# Patient Record
Sex: MALE | Race: WHITE | NOT HISPANIC OR LATINO | Employment: OTHER | URBAN - METROPOLITAN AREA
[De-identification: names, ages, dates, MRNs, and addresses within clinical notes are randomized per-mention and may not be internally consistent; named-entity substitution may affect disease eponyms.]

---

## 2017-01-16 ENCOUNTER — COMMUNICATION - HEALTHEAST (OUTPATIENT)
Dept: INTERNAL MEDICINE | Facility: CLINIC | Age: 62
End: 2017-01-16

## 2017-01-16 DIAGNOSIS — F90.9 ADHD (ATTENTION DEFICIT HYPERACTIVITY DISORDER): ICD-10-CM

## 2017-02-14 ENCOUNTER — COMMUNICATION - HEALTHEAST (OUTPATIENT)
Dept: SCHEDULING | Facility: CLINIC | Age: 62
End: 2017-02-14

## 2017-02-14 DIAGNOSIS — F90.9 ADHD (ATTENTION DEFICIT HYPERACTIVITY DISORDER): ICD-10-CM

## 2017-02-17 ENCOUNTER — COMMUNICATION - HEALTHEAST (OUTPATIENT)
Dept: INTERNAL MEDICINE | Facility: CLINIC | Age: 62
End: 2017-02-17

## 2017-02-20 ENCOUNTER — RECORDS - HEALTHEAST (OUTPATIENT)
Dept: ADMINISTRATIVE | Facility: OTHER | Age: 62
End: 2017-02-20

## 2017-03-14 ENCOUNTER — COMMUNICATION - HEALTHEAST (OUTPATIENT)
Dept: INTERNAL MEDICINE | Facility: CLINIC | Age: 62
End: 2017-03-14

## 2017-03-20 ENCOUNTER — COMMUNICATION - HEALTHEAST (OUTPATIENT)
Dept: INTERNAL MEDICINE | Facility: CLINIC | Age: 62
End: 2017-03-20

## 2017-03-20 DIAGNOSIS — F90.9 ADHD (ATTENTION DEFICIT HYPERACTIVITY DISORDER): ICD-10-CM

## 2017-04-18 ENCOUNTER — COMMUNICATION - HEALTHEAST (OUTPATIENT)
Dept: INTERNAL MEDICINE | Facility: CLINIC | Age: 62
End: 2017-04-18

## 2017-04-18 DIAGNOSIS — F90.9 ADHD (ATTENTION DEFICIT HYPERACTIVITY DISORDER): ICD-10-CM

## 2017-05-15 ENCOUNTER — COMMUNICATION - HEALTHEAST (OUTPATIENT)
Dept: INTERNAL MEDICINE | Facility: CLINIC | Age: 62
End: 2017-05-15

## 2017-05-15 DIAGNOSIS — F31.9 BIPOLAR DISORDER, UNSPECIFIED (H): ICD-10-CM

## 2017-05-15 DIAGNOSIS — F90.9 ADHD (ATTENTION DEFICIT HYPERACTIVITY DISORDER): ICD-10-CM

## 2017-06-14 ENCOUNTER — COMMUNICATION - HEALTHEAST (OUTPATIENT)
Dept: INTERNAL MEDICINE | Facility: CLINIC | Age: 62
End: 2017-06-14

## 2017-06-14 DIAGNOSIS — F90.9 ADHD (ATTENTION DEFICIT HYPERACTIVITY DISORDER): ICD-10-CM

## 2017-07-03 ENCOUNTER — COMMUNICATION - HEALTHEAST (OUTPATIENT)
Dept: INTERNAL MEDICINE | Facility: CLINIC | Age: 62
End: 2017-07-03

## 2017-07-03 DIAGNOSIS — S62.609A FINGER FRACTURE, LEFT: ICD-10-CM

## 2017-07-10 ENCOUNTER — RECORDS - HEALTHEAST (OUTPATIENT)
Dept: ADMINISTRATIVE | Facility: OTHER | Age: 62
End: 2017-07-10

## 2017-07-11 ENCOUNTER — COMMUNICATION - HEALTHEAST (OUTPATIENT)
Dept: INTERNAL MEDICINE | Facility: CLINIC | Age: 62
End: 2017-07-11

## 2017-07-11 DIAGNOSIS — F90.9 ADHD (ATTENTION DEFICIT HYPERACTIVITY DISORDER): ICD-10-CM

## 2017-08-14 ENCOUNTER — COMMUNICATION - HEALTHEAST (OUTPATIENT)
Dept: INTERNAL MEDICINE | Facility: CLINIC | Age: 62
End: 2017-08-14

## 2017-08-14 DIAGNOSIS — F90.9 ADHD (ATTENTION DEFICIT HYPERACTIVITY DISORDER): ICD-10-CM

## 2017-09-04 ENCOUNTER — COMMUNICATION - HEALTHEAST (OUTPATIENT)
Dept: INTERNAL MEDICINE | Facility: CLINIC | Age: 62
End: 2017-09-04

## 2017-09-04 DIAGNOSIS — F31.9 BIPOLAR DISORDER, UNSPECIFIED (H): ICD-10-CM

## 2017-09-12 ENCOUNTER — COMMUNICATION - HEALTHEAST (OUTPATIENT)
Dept: INTERNAL MEDICINE | Facility: CLINIC | Age: 62
End: 2017-09-12

## 2017-09-12 DIAGNOSIS — F90.9 ADHD (ATTENTION DEFICIT HYPERACTIVITY DISORDER): ICD-10-CM

## 2017-09-12 DIAGNOSIS — I10 HYPERTENSION: ICD-10-CM

## 2017-09-28 ENCOUNTER — OFFICE VISIT - HEALTHEAST (OUTPATIENT)
Dept: INTERNAL MEDICINE | Facility: CLINIC | Age: 62
End: 2017-09-28

## 2017-09-28 DIAGNOSIS — Z51.81 MEDICATION MONITORING ENCOUNTER: ICD-10-CM

## 2017-09-28 DIAGNOSIS — Z12.5 PROSTATE CANCER SCREENING: ICD-10-CM

## 2017-09-28 DIAGNOSIS — I10 ESSENTIAL HYPERTENSION: ICD-10-CM

## 2017-09-28 DIAGNOSIS — Z12.11 COLON CANCER SCREENING: ICD-10-CM

## 2017-09-28 LAB
CHOLEST SERPL-MCNC: 227 MG/DL
FASTING STATUS PATIENT QL REPORTED: YES
HDLC SERPL-MCNC: 58 MG/DL
LDLC SERPL CALC-MCNC: 146 MG/DL
PSA SERPL-MCNC: 0.4 NG/ML (ref 0–4.5)
TRIGL SERPL-MCNC: 116 MG/DL

## 2017-09-28 ASSESSMENT — MIFFLIN-ST. JEOR: SCORE: 1549.66

## 2017-09-29 ENCOUNTER — COMMUNICATION - HEALTHEAST (OUTPATIENT)
Dept: INTERNAL MEDICINE | Facility: CLINIC | Age: 62
End: 2017-09-29

## 2017-10-10 ENCOUNTER — COMMUNICATION - HEALTHEAST (OUTPATIENT)
Dept: INTERNAL MEDICINE | Facility: CLINIC | Age: 62
End: 2017-10-10

## 2017-10-10 DIAGNOSIS — F90.9 ADHD (ATTENTION DEFICIT HYPERACTIVITY DISORDER): ICD-10-CM

## 2017-10-12 ENCOUNTER — COMMUNICATION - HEALTHEAST (OUTPATIENT)
Dept: INTERNAL MEDICINE | Facility: CLINIC | Age: 62
End: 2017-10-12

## 2017-10-12 DIAGNOSIS — I10 HYPERTENSION: ICD-10-CM

## 2017-11-13 ENCOUNTER — COMMUNICATION - HEALTHEAST (OUTPATIENT)
Dept: INTERNAL MEDICINE | Facility: CLINIC | Age: 62
End: 2017-11-13

## 2017-11-13 DIAGNOSIS — F90.9 ADHD (ATTENTION DEFICIT HYPERACTIVITY DISORDER): ICD-10-CM

## 2017-11-15 ENCOUNTER — COMMUNICATION - HEALTHEAST (OUTPATIENT)
Dept: INTERNAL MEDICINE | Facility: CLINIC | Age: 62
End: 2017-11-15

## 2017-11-15 DIAGNOSIS — M25.50 JOINT PAIN: ICD-10-CM

## 2017-12-11 ENCOUNTER — COMMUNICATION - HEALTHEAST (OUTPATIENT)
Dept: INTERNAL MEDICINE | Facility: CLINIC | Age: 62
End: 2017-12-11

## 2017-12-11 DIAGNOSIS — F90.9 ADHD (ATTENTION DEFICIT HYPERACTIVITY DISORDER): ICD-10-CM

## 2018-01-10 ENCOUNTER — COMMUNICATION - HEALTHEAST (OUTPATIENT)
Dept: INTERNAL MEDICINE | Facility: CLINIC | Age: 63
End: 2018-01-10

## 2018-01-10 DIAGNOSIS — F90.9 ADHD (ATTENTION DEFICIT HYPERACTIVITY DISORDER): ICD-10-CM

## 2018-02-07 ENCOUNTER — COMMUNICATION - HEALTHEAST (OUTPATIENT)
Dept: INTERNAL MEDICINE | Facility: CLINIC | Age: 63
End: 2018-02-07

## 2018-02-07 DIAGNOSIS — F90.9 ADHD (ATTENTION DEFICIT HYPERACTIVITY DISORDER): ICD-10-CM

## 2018-03-07 ENCOUNTER — COMMUNICATION - HEALTHEAST (OUTPATIENT)
Dept: INTERNAL MEDICINE | Facility: CLINIC | Age: 63
End: 2018-03-07

## 2018-03-07 DIAGNOSIS — F90.9 ADHD (ATTENTION DEFICIT HYPERACTIVITY DISORDER): ICD-10-CM

## 2018-03-09 ENCOUNTER — COMMUNICATION - HEALTHEAST (OUTPATIENT)
Dept: INTERNAL MEDICINE | Facility: CLINIC | Age: 63
End: 2018-03-09

## 2018-03-09 DIAGNOSIS — F90.9 ATTENTION DEFICIT HYPERACTIVITY DISORDER (ADHD), UNSPECIFIED ADHD TYPE: ICD-10-CM

## 2018-03-12 ENCOUNTER — COMMUNICATION - HEALTHEAST (OUTPATIENT)
Dept: INTERNAL MEDICINE | Facility: CLINIC | Age: 63
End: 2018-03-12

## 2018-03-12 ENCOUNTER — RECORDS - HEALTHEAST (OUTPATIENT)
Dept: ADMINISTRATIVE | Facility: OTHER | Age: 63
End: 2018-03-12

## 2018-03-29 ENCOUNTER — COMMUNICATION - HEALTHEAST (OUTPATIENT)
Dept: SCHEDULING | Facility: CLINIC | Age: 63
End: 2018-03-29

## 2018-04-03 ENCOUNTER — COMMUNICATION - HEALTHEAST (OUTPATIENT)
Dept: INTERNAL MEDICINE | Facility: CLINIC | Age: 63
End: 2018-04-03

## 2018-04-03 DIAGNOSIS — F31.9 BIPOLAR DISORDER (H): ICD-10-CM

## 2018-04-03 DIAGNOSIS — I10 HYPERTENSION: ICD-10-CM

## 2018-04-05 ENCOUNTER — COMMUNICATION - HEALTHEAST (OUTPATIENT)
Dept: INTERNAL MEDICINE | Facility: CLINIC | Age: 63
End: 2018-04-05

## 2018-04-05 DIAGNOSIS — I10 HYPERTENSION: ICD-10-CM

## 2018-04-05 DIAGNOSIS — F31.9 BIPOLAR DISORDER (H): ICD-10-CM

## 2018-04-10 ENCOUNTER — OFFICE VISIT - HEALTHEAST (OUTPATIENT)
Dept: INTERNAL MEDICINE | Facility: CLINIC | Age: 63
End: 2018-04-10

## 2018-04-10 DIAGNOSIS — F90.9 ADHD (ATTENTION DEFICIT HYPERACTIVITY DISORDER): ICD-10-CM

## 2018-04-10 DIAGNOSIS — F31.9 BIPOLAR DISORDER (H): ICD-10-CM

## 2018-05-09 ENCOUNTER — COMMUNICATION - HEALTHEAST (OUTPATIENT)
Dept: INTERNAL MEDICINE | Facility: CLINIC | Age: 63
End: 2018-05-09

## 2018-05-09 DIAGNOSIS — F90.9 ADHD (ATTENTION DEFICIT HYPERACTIVITY DISORDER): ICD-10-CM

## 2018-05-10 ENCOUNTER — RECORDS - HEALTHEAST (OUTPATIENT)
Dept: ADMINISTRATIVE | Facility: OTHER | Age: 63
End: 2018-05-10

## 2018-06-05 ENCOUNTER — COMMUNICATION - HEALTHEAST (OUTPATIENT)
Dept: INTERNAL MEDICINE | Facility: CLINIC | Age: 63
End: 2018-06-05

## 2018-06-05 DIAGNOSIS — F90.9 ADHD (ATTENTION DEFICIT HYPERACTIVITY DISORDER): ICD-10-CM

## 2018-07-03 ENCOUNTER — COMMUNICATION - HEALTHEAST (OUTPATIENT)
Dept: INTERNAL MEDICINE | Facility: CLINIC | Age: 63
End: 2018-07-03

## 2018-07-03 DIAGNOSIS — F90.9 ADHD (ATTENTION DEFICIT HYPERACTIVITY DISORDER): ICD-10-CM

## 2018-07-04 ENCOUNTER — COMMUNICATION - HEALTHEAST (OUTPATIENT)
Dept: INTERNAL MEDICINE | Facility: CLINIC | Age: 63
End: 2018-07-04

## 2018-07-04 DIAGNOSIS — I10 HYPERTENSION: ICD-10-CM

## 2018-08-02 ENCOUNTER — COMMUNICATION - HEALTHEAST (OUTPATIENT)
Dept: INTERNAL MEDICINE | Facility: CLINIC | Age: 63
End: 2018-08-02

## 2018-08-02 DIAGNOSIS — F90.9 ADHD (ATTENTION DEFICIT HYPERACTIVITY DISORDER): ICD-10-CM

## 2018-08-29 ENCOUNTER — COMMUNICATION - HEALTHEAST (OUTPATIENT)
Dept: INTERNAL MEDICINE | Facility: CLINIC | Age: 63
End: 2018-08-29

## 2018-08-29 DIAGNOSIS — I10 HYPERTENSION: ICD-10-CM

## 2018-08-30 ENCOUNTER — COMMUNICATION - HEALTHEAST (OUTPATIENT)
Dept: INTERNAL MEDICINE | Facility: CLINIC | Age: 63
End: 2018-08-30

## 2018-08-30 DIAGNOSIS — F90.9 ADHD (ATTENTION DEFICIT HYPERACTIVITY DISORDER): ICD-10-CM

## 2018-09-27 ENCOUNTER — COMMUNICATION - HEALTHEAST (OUTPATIENT)
Dept: INTERNAL MEDICINE | Facility: CLINIC | Age: 63
End: 2018-09-27

## 2018-09-27 DIAGNOSIS — F31.9 BIPOLAR DISORDER (H): ICD-10-CM

## 2018-09-27 DIAGNOSIS — M25.50 JOINT PAIN: ICD-10-CM

## 2018-09-27 DIAGNOSIS — F90.9 ADHD (ATTENTION DEFICIT HYPERACTIVITY DISORDER): ICD-10-CM

## 2018-09-28 ENCOUNTER — COMMUNICATION - HEALTHEAST (OUTPATIENT)
Dept: INTERNAL MEDICINE | Facility: CLINIC | Age: 63
End: 2018-09-28

## 2018-09-28 DIAGNOSIS — I10 HYPERTENSION: ICD-10-CM

## 2018-10-11 ENCOUNTER — OFFICE VISIT - HEALTHEAST (OUTPATIENT)
Dept: INTERNAL MEDICINE | Facility: CLINIC | Age: 63
End: 2018-10-11

## 2018-10-11 DIAGNOSIS — Z00.00 HEALTH CARE MAINTENANCE: ICD-10-CM

## 2018-10-11 DIAGNOSIS — E78.00 HYPERCHOLESTEROLEMIA: ICD-10-CM

## 2018-10-11 DIAGNOSIS — Z12.5 SCREENING FOR PROSTATE CANCER: ICD-10-CM

## 2018-10-11 DIAGNOSIS — I10 BENIGN ESSENTIAL HYPERTENSION: ICD-10-CM

## 2018-10-11 DIAGNOSIS — Z71.6 ENCOUNTER FOR TOBACCO USE CESSATION COUNSELING: ICD-10-CM

## 2018-10-11 DIAGNOSIS — Z12.11 COLON CANCER SCREENING: ICD-10-CM

## 2018-10-11 DIAGNOSIS — Z51.81 MEDICATION MONITORING ENCOUNTER: ICD-10-CM

## 2018-10-11 DIAGNOSIS — F31.75 BIPOLAR DISORDER, IN PARTIAL REMISSION, MOST RECENT EPISODE DEPRESSED (H): ICD-10-CM

## 2018-10-11 LAB
ALBUMIN SERPL-MCNC: 4.1 G/DL (ref 3.5–5)
ALBUMIN UR-MCNC: NEGATIVE MG/DL
ALP SERPL-CCNC: 87 U/L (ref 45–120)
ALT SERPL W P-5'-P-CCNC: 17 U/L (ref 0–45)
AMPHETAMINES UR QL SCN: ABNORMAL
ANION GAP SERPL CALCULATED.3IONS-SCNC: 13 MMOL/L (ref 5–18)
APPEARANCE UR: CLEAR
AST SERPL W P-5'-P-CCNC: 23 U/L (ref 0–40)
BARBITURATES UR QL: ABNORMAL
BENZODIAZ UR QL: ABNORMAL
BILIRUB SERPL-MCNC: 0.8 MG/DL (ref 0–1)
BILIRUB UR QL STRIP: NEGATIVE
BUN SERPL-MCNC: 24 MG/DL (ref 8–22)
CALCIUM SERPL-MCNC: 10 MG/DL (ref 8.5–10.5)
CANNABINOIDS UR QL SCN: ABNORMAL
CHLORIDE BLD-SCNC: 100 MMOL/L (ref 98–107)
CHOLEST SERPL-MCNC: 215 MG/DL
CO2 SERPL-SCNC: 26 MMOL/L (ref 22–31)
COCAINE UR QL: ABNORMAL
COLOR UR AUTO: YELLOW
CREAT SERPL-MCNC: 1.2 MG/DL (ref 0.7–1.3)
CREAT UR-MCNC: 188.9 MG/DL
ERYTHROCYTE [DISTWIDTH] IN BLOOD BY AUTOMATED COUNT: 11.4 % (ref 11–14.5)
FASTING STATUS PATIENT QL REPORTED: ABNORMAL
GFR SERPL CREATININE-BSD FRML MDRD: >60 ML/MIN/1.73M2
GLUCOSE BLD-MCNC: 109 MG/DL (ref 70–125)
GLUCOSE UR STRIP-MCNC: NEGATIVE MG/DL
HCT VFR BLD AUTO: 43.9 % (ref 40–54)
HDLC SERPL-MCNC: 50 MG/DL
HGB BLD-MCNC: 15.1 G/DL (ref 14–18)
HGB UR QL STRIP: NEGATIVE
KETONES UR STRIP-MCNC: NEGATIVE MG/DL
LDLC SERPL CALC-MCNC: 153 MG/DL
LEUKOCYTE ESTERASE UR QL STRIP: NEGATIVE
MCH RBC QN AUTO: 31.3 PG (ref 27–34)
MCHC RBC AUTO-ENTMCNC: 34.4 G/DL (ref 32–36)
MCV RBC AUTO: 91 FL (ref 80–100)
METHADONE UR QL SCN: ABNORMAL
NITRATE UR QL: NEGATIVE
OPIATES UR QL SCN: ABNORMAL
OXYCODONE UR QL: ABNORMAL
PCP UR QL SCN: ABNORMAL
PH UR STRIP: 5.5 [PH] (ref 5–8)
PLATELET # BLD AUTO: 198 THOU/UL (ref 140–440)
PMV BLD AUTO: 7.2 FL (ref 7–10)
POTASSIUM BLD-SCNC: 3.6 MMOL/L (ref 3.5–5)
PROT SERPL-MCNC: 6.9 G/DL (ref 6–8)
PSA SERPL-MCNC: 0.4 NG/ML (ref 0–4.5)
RBC # BLD AUTO: 4.81 MILL/UL (ref 4.4–6.2)
SODIUM SERPL-SCNC: 139 MMOL/L (ref 136–145)
SP GR UR STRIP: 1.02 (ref 1–1.03)
TRIGL SERPL-MCNC: 58 MG/DL
UROBILINOGEN UR STRIP-ACNC: NORMAL
WBC: 5.7 THOU/UL (ref 4–11)

## 2018-10-11 ASSESSMENT — MIFFLIN-ST. JEOR: SCORE: 1505.66

## 2018-10-15 ENCOUNTER — COMMUNICATION - HEALTHEAST (OUTPATIENT)
Dept: INTERNAL MEDICINE | Facility: CLINIC | Age: 63
End: 2018-10-15

## 2018-10-25 ENCOUNTER — COMMUNICATION - HEALTHEAST (OUTPATIENT)
Dept: INTERNAL MEDICINE | Facility: CLINIC | Age: 63
End: 2018-10-25

## 2018-10-25 DIAGNOSIS — F90.9 ADHD (ATTENTION DEFICIT HYPERACTIVITY DISORDER): ICD-10-CM

## 2018-11-21 ENCOUNTER — COMMUNICATION - HEALTHEAST (OUTPATIENT)
Dept: INTERNAL MEDICINE | Facility: CLINIC | Age: 63
End: 2018-11-21

## 2018-11-21 DIAGNOSIS — F90.9 ADHD (ATTENTION DEFICIT HYPERACTIVITY DISORDER): ICD-10-CM

## 2018-12-20 ENCOUNTER — COMMUNICATION - HEALTHEAST (OUTPATIENT)
Dept: INTERNAL MEDICINE | Facility: CLINIC | Age: 63
End: 2018-12-20

## 2018-12-20 DIAGNOSIS — F90.9 ADHD (ATTENTION DEFICIT HYPERACTIVITY DISORDER): ICD-10-CM

## 2019-01-17 ENCOUNTER — COMMUNICATION - HEALTHEAST (OUTPATIENT)
Dept: INTERNAL MEDICINE | Facility: CLINIC | Age: 64
End: 2019-01-17

## 2019-01-17 DIAGNOSIS — F90.9 ADHD (ATTENTION DEFICIT HYPERACTIVITY DISORDER): ICD-10-CM

## 2019-02-01 ENCOUNTER — COMMUNICATION - HEALTHEAST (OUTPATIENT)
Dept: INTERNAL MEDICINE | Facility: CLINIC | Age: 64
End: 2019-02-01

## 2019-02-01 DIAGNOSIS — I10 HYPERTENSION: ICD-10-CM

## 2019-02-15 ENCOUNTER — COMMUNICATION - HEALTHEAST (OUTPATIENT)
Dept: INTERNAL MEDICINE | Facility: CLINIC | Age: 64
End: 2019-02-15

## 2019-02-15 DIAGNOSIS — F90.9 ADHD (ATTENTION DEFICIT HYPERACTIVITY DISORDER): ICD-10-CM

## 2019-02-25 ENCOUNTER — COMMUNICATION - HEALTHEAST (OUTPATIENT)
Dept: INTERNAL MEDICINE | Facility: CLINIC | Age: 64
End: 2019-02-25

## 2019-02-25 DIAGNOSIS — F31.75 BIPOLAR DISORDER, IN PARTIAL REMISSION, MOST RECENT EPISODE DEPRESSED (H): ICD-10-CM

## 2019-03-14 ENCOUNTER — COMMUNICATION - HEALTHEAST (OUTPATIENT)
Dept: INTERNAL MEDICINE | Facility: CLINIC | Age: 64
End: 2019-03-14

## 2019-03-14 DIAGNOSIS — F90.9 ADHD (ATTENTION DEFICIT HYPERACTIVITY DISORDER): ICD-10-CM

## 2019-03-28 ENCOUNTER — COMMUNICATION - HEALTHEAST (OUTPATIENT)
Dept: INTERNAL MEDICINE | Facility: CLINIC | Age: 64
End: 2019-03-28

## 2019-03-28 DIAGNOSIS — F90.9 ADHD (ATTENTION DEFICIT HYPERACTIVITY DISORDER): ICD-10-CM

## 2019-04-16 ENCOUNTER — OFFICE VISIT - HEALTHEAST (OUTPATIENT)
Dept: INTERNAL MEDICINE | Facility: CLINIC | Age: 64
End: 2019-04-16

## 2019-04-16 DIAGNOSIS — I10 ESSENTIAL HYPERTENSION: ICD-10-CM

## 2019-04-16 DIAGNOSIS — E78.00 HYPERCHOLESTEREMIA: ICD-10-CM

## 2019-04-16 DIAGNOSIS — F98.8 ATTENTION DEFICIT DISORDER (ADD) WITHOUT HYPERACTIVITY: ICD-10-CM

## 2019-04-16 DIAGNOSIS — F30.9 BIPOLAR I DISORDER, SINGLE MANIC EPISODE (H): ICD-10-CM

## 2019-04-16 DIAGNOSIS — F17.209 TOBACCO USE DISORDER, CONTINUOUS: ICD-10-CM

## 2019-04-16 ASSESSMENT — MIFFLIN-ST. JEOR: SCORE: 1591.39

## 2019-04-17 ENCOUNTER — COMMUNICATION - HEALTHEAST (OUTPATIENT)
Dept: INTERNAL MEDICINE | Facility: CLINIC | Age: 64
End: 2019-04-17

## 2019-04-30 ENCOUNTER — COMMUNICATION - HEALTHEAST (OUTPATIENT)
Dept: INTERNAL MEDICINE | Facility: CLINIC | Age: 64
End: 2019-04-30

## 2019-04-30 DIAGNOSIS — F90.9 ADHD (ATTENTION DEFICIT HYPERACTIVITY DISORDER): ICD-10-CM

## 2019-05-29 ENCOUNTER — COMMUNICATION - HEALTHEAST (OUTPATIENT)
Dept: INTERNAL MEDICINE | Facility: CLINIC | Age: 64
End: 2019-05-29

## 2019-05-29 DIAGNOSIS — F90.9 ADHD (ATTENTION DEFICIT HYPERACTIVITY DISORDER): ICD-10-CM

## 2019-06-27 ENCOUNTER — COMMUNICATION - HEALTHEAST (OUTPATIENT)
Dept: INTERNAL MEDICINE | Facility: CLINIC | Age: 64
End: 2019-06-27

## 2019-06-27 DIAGNOSIS — F90.9 ADHD (ATTENTION DEFICIT HYPERACTIVITY DISORDER): ICD-10-CM

## 2019-06-27 DIAGNOSIS — I10 HYPERTENSION: ICD-10-CM

## 2019-07-24 ENCOUNTER — COMMUNICATION - HEALTHEAST (OUTPATIENT)
Dept: INTERNAL MEDICINE | Facility: CLINIC | Age: 64
End: 2019-07-24

## 2019-07-24 DIAGNOSIS — F90.9 ADHD (ATTENTION DEFICIT HYPERACTIVITY DISORDER): ICD-10-CM

## 2019-08-22 ENCOUNTER — COMMUNICATION - HEALTHEAST (OUTPATIENT)
Dept: INTERNAL MEDICINE | Facility: CLINIC | Age: 64
End: 2019-08-22

## 2019-08-22 DIAGNOSIS — F90.9 ADHD (ATTENTION DEFICIT HYPERACTIVITY DISORDER): ICD-10-CM

## 2019-09-20 ENCOUNTER — COMMUNICATION - HEALTHEAST (OUTPATIENT)
Dept: INTERNAL MEDICINE | Facility: CLINIC | Age: 64
End: 2019-09-20

## 2019-09-20 DIAGNOSIS — F90.9 ADHD (ATTENTION DEFICIT HYPERACTIVITY DISORDER): ICD-10-CM

## 2019-10-15 ENCOUNTER — COMMUNICATION - HEALTHEAST (OUTPATIENT)
Dept: INTERNAL MEDICINE | Facility: CLINIC | Age: 64
End: 2019-10-15

## 2019-10-15 DIAGNOSIS — F31.75 BIPOLAR DISORDER, IN PARTIAL REMISSION, MOST RECENT EPISODE DEPRESSED (H): ICD-10-CM

## 2019-10-18 ENCOUNTER — OFFICE VISIT - HEALTHEAST (OUTPATIENT)
Dept: INTERNAL MEDICINE | Facility: CLINIC | Age: 64
End: 2019-10-18

## 2019-10-18 DIAGNOSIS — E78.00 HYPERCHOLESTEROLEMIA: ICD-10-CM

## 2019-10-18 DIAGNOSIS — F30.9 BIPOLAR I DISORDER, SINGLE MANIC EPISODE (H): ICD-10-CM

## 2019-10-18 DIAGNOSIS — R00.0 TACHYCARDIA: ICD-10-CM

## 2019-10-18 DIAGNOSIS — I10 ESSENTIAL HYPERTENSION, BENIGN: ICD-10-CM

## 2019-10-18 DIAGNOSIS — F90.9 ADHD (ATTENTION DEFICIT HYPERACTIVITY DISORDER): ICD-10-CM

## 2019-10-18 DIAGNOSIS — Z51.81 ENCOUNTER FOR THERAPEUTIC DRUG MONITORING: ICD-10-CM

## 2019-10-18 DIAGNOSIS — Z12.5 SCREENING FOR PROSTATE CANCER: ICD-10-CM

## 2019-10-18 DIAGNOSIS — Z71.6 ENCOUNTER FOR TOBACCO USE CESSATION COUNSELING: ICD-10-CM

## 2019-10-18 LAB
ALBUMIN SERPL-MCNC: 4.2 G/DL (ref 3.5–5)
ALP SERPL-CCNC: 90 U/L (ref 45–120)
ALT SERPL W P-5'-P-CCNC: 19 U/L (ref 0–45)
AMPHETAMINES UR QL SCN: ABNORMAL
ANION GAP SERPL CALCULATED.3IONS-SCNC: 11 MMOL/L (ref 5–18)
AST SERPL W P-5'-P-CCNC: 17 U/L (ref 0–40)
ATRIAL RATE - MUSE: 112 BPM
BARBITURATES UR QL: ABNORMAL
BENZODIAZ UR QL: ABNORMAL
BILIRUB SERPL-MCNC: 0.5 MG/DL (ref 0–1)
BUN SERPL-MCNC: 14 MG/DL (ref 8–22)
CALCIUM SERPL-MCNC: 10.2 MG/DL (ref 8.5–10.5)
CANNABINOIDS UR QL SCN: ABNORMAL
CHLORIDE BLD-SCNC: 104 MMOL/L (ref 98–107)
CHOLEST SERPL-MCNC: 236 MG/DL
CO2 SERPL-SCNC: 25 MMOL/L (ref 22–31)
COCAINE UR QL: ABNORMAL
CREAT SERPL-MCNC: 1.1 MG/DL (ref 0.7–1.3)
CREAT UR-MCNC: 125.3 MG/DL
DIASTOLIC BLOOD PRESSURE - MUSE: NORMAL
ERYTHROCYTE [DISTWIDTH] IN BLOOD BY AUTOMATED COUNT: 11.4 % (ref 11–14.5)
FASTING STATUS PATIENT QL REPORTED: YES
GFR SERPL CREATININE-BSD FRML MDRD: >60 ML/MIN/1.73M2
GLUCOSE BLD-MCNC: 128 MG/DL (ref 70–125)
HCT VFR BLD AUTO: 48.1 % (ref 40–54)
HDLC SERPL-MCNC: 58 MG/DL
HGB BLD-MCNC: 16.4 G/DL (ref 14–18)
INTERPRETATION ECG - MUSE: NORMAL
LDLC SERPL CALC-MCNC: 138 MG/DL
MCH RBC QN AUTO: 31.9 PG (ref 27–34)
MCHC RBC AUTO-ENTMCNC: 34.1 G/DL (ref 32–36)
MCV RBC AUTO: 94 FL (ref 80–100)
METHADONE UR QL SCN: ABNORMAL
OPIATES UR QL SCN: ABNORMAL
OXYCODONE UR QL: ABNORMAL
P AXIS - MUSE: 56 DEGREES
PCP UR QL SCN: ABNORMAL
PLATELET # BLD AUTO: 236 THOU/UL (ref 140–440)
PMV BLD AUTO: 7.6 FL (ref 7–10)
POTASSIUM BLD-SCNC: 4.4 MMOL/L (ref 3.5–5)
PR INTERVAL - MUSE: 162 MS
PROT SERPL-MCNC: 7.4 G/DL (ref 6–8)
PSA SERPL-MCNC: 0.3 NG/ML (ref 0–4.5)
QRS DURATION - MUSE: 70 MS
QT - MUSE: 330 MS
QTC - MUSE: 450 MS
R AXIS - MUSE: 5 DEGREES
RBC # BLD AUTO: 5.13 MILL/UL (ref 4.4–6.2)
SODIUM SERPL-SCNC: 140 MMOL/L (ref 136–145)
SYSTOLIC BLOOD PRESSURE - MUSE: NORMAL
T AXIS - MUSE: 8 DEGREES
TRIGL SERPL-MCNC: 198 MG/DL
TSH SERPL DL<=0.005 MIU/L-ACNC: 1.14 UIU/ML (ref 0.3–5)
VENTRICULAR RATE- MUSE: 112 BPM
WBC: 6.2 THOU/UL (ref 4–11)

## 2019-10-18 ASSESSMENT — MIFFLIN-ST. JEOR: SCORE: 1609.53

## 2019-11-15 ENCOUNTER — COMMUNICATION - HEALTHEAST (OUTPATIENT)
Dept: INTERNAL MEDICINE | Facility: CLINIC | Age: 64
End: 2019-11-15

## 2019-11-15 DIAGNOSIS — F90.9 ADHD (ATTENTION DEFICIT HYPERACTIVITY DISORDER): ICD-10-CM

## 2019-12-09 ENCOUNTER — COMMUNICATION - HEALTHEAST (OUTPATIENT)
Dept: INTERNAL MEDICINE | Facility: CLINIC | Age: 64
End: 2019-12-09

## 2019-12-09 DIAGNOSIS — I10 HYPERTENSION: ICD-10-CM

## 2019-12-17 ENCOUNTER — COMMUNICATION - HEALTHEAST (OUTPATIENT)
Dept: INTERNAL MEDICINE | Facility: CLINIC | Age: 64
End: 2019-12-17

## 2019-12-17 DIAGNOSIS — F90.9 ADHD (ATTENTION DEFICIT HYPERACTIVITY DISORDER): ICD-10-CM

## 2019-12-19 ENCOUNTER — COMMUNICATION - HEALTHEAST (OUTPATIENT)
Dept: INTERNAL MEDICINE | Facility: CLINIC | Age: 64
End: 2019-12-19

## 2019-12-19 DIAGNOSIS — M25.50 JOINT PAIN: ICD-10-CM

## 2020-01-09 ENCOUNTER — COMMUNICATION - HEALTHEAST (OUTPATIENT)
Dept: INTERNAL MEDICINE | Facility: CLINIC | Age: 65
End: 2020-01-09

## 2020-01-16 ENCOUNTER — COMMUNICATION - HEALTHEAST (OUTPATIENT)
Dept: INTERNAL MEDICINE | Facility: CLINIC | Age: 65
End: 2020-01-16

## 2020-01-16 DIAGNOSIS — F90.9 ADHD (ATTENTION DEFICIT HYPERACTIVITY DISORDER): ICD-10-CM

## 2020-01-31 ENCOUNTER — OFFICE VISIT - HEALTHEAST (OUTPATIENT)
Dept: INTERNAL MEDICINE | Facility: CLINIC | Age: 65
End: 2020-01-31

## 2020-01-31 DIAGNOSIS — R73.9 HYPERGLYCEMIA: ICD-10-CM

## 2020-01-31 DIAGNOSIS — I10 ESSENTIAL HYPERTENSION: ICD-10-CM

## 2020-01-31 DIAGNOSIS — M25.50 JOINT PAIN: ICD-10-CM

## 2020-01-31 DIAGNOSIS — F90.1 ATTENTION DEFICIT HYPERACTIVITY DISORDER (ADHD), PREDOMINANTLY HYPERACTIVE TYPE: ICD-10-CM

## 2020-01-31 DIAGNOSIS — B18.2 CHRONIC HEPATITIS C WITHOUT HEPATIC COMA (H): ICD-10-CM

## 2020-01-31 DIAGNOSIS — F30.9 BIPOLAR I DISORDER, SINGLE MANIC EPISODE (H): ICD-10-CM

## 2020-01-31 DIAGNOSIS — F17.209 TOBACCO USE DISORDER, CONTINUOUS: ICD-10-CM

## 2020-01-31 DIAGNOSIS — R00.0 TACHYCARDIA: ICD-10-CM

## 2020-01-31 DIAGNOSIS — I10 ESSENTIAL HYPERTENSION, BENIGN: ICD-10-CM

## 2020-01-31 LAB
ANION GAP SERPL CALCULATED.3IONS-SCNC: 16 MMOL/L (ref 5–18)
BUN SERPL-MCNC: 19 MG/DL (ref 8–22)
CALCIUM SERPL-MCNC: 10.4 MG/DL (ref 8.5–10.5)
CHLORIDE BLD-SCNC: 101 MMOL/L (ref 98–107)
CO2 SERPL-SCNC: 28 MMOL/L (ref 22–31)
CREAT SERPL-MCNC: 1.24 MG/DL (ref 0.7–1.3)
GFR SERPL CREATININE-BSD FRML MDRD: 59 ML/MIN/1.73M2
GLUCOSE BLD-MCNC: 170 MG/DL (ref 70–125)
HBA1C MFR BLD: 6 % (ref 3.5–6)
POTASSIUM BLD-SCNC: 4.4 MMOL/L (ref 3.5–5)
SODIUM SERPL-SCNC: 145 MMOL/L (ref 136–145)

## 2020-01-31 RX ORDER — IBUPROFEN 600 MG/1
600 TABLET, FILM COATED ORAL EVERY 8 HOURS PRN
Qty: 90 TABLET | Refills: 3 | Status: SHIPPED | OUTPATIENT
Start: 2020-01-31 | End: 2021-08-30

## 2020-01-31 ASSESSMENT — MIFFLIN-ST. JEOR: SCORE: 1609.53

## 2020-02-03 ENCOUNTER — COMMUNICATION - HEALTHEAST (OUTPATIENT)
Dept: INTERNAL MEDICINE | Facility: CLINIC | Age: 65
End: 2020-02-03

## 2020-02-14 ENCOUNTER — COMMUNICATION - HEALTHEAST (OUTPATIENT)
Dept: INTERNAL MEDICINE | Facility: CLINIC | Age: 65
End: 2020-02-14

## 2020-02-14 DIAGNOSIS — F90.9 ADHD (ATTENTION DEFICIT HYPERACTIVITY DISORDER): ICD-10-CM

## 2020-03-09 ENCOUNTER — COMMUNICATION - HEALTHEAST (OUTPATIENT)
Dept: INTERNAL MEDICINE | Facility: CLINIC | Age: 65
End: 2020-03-09

## 2020-03-09 DIAGNOSIS — I10 HYPERTENSION: ICD-10-CM

## 2020-03-17 ENCOUNTER — COMMUNICATION - HEALTHEAST (OUTPATIENT)
Dept: INTERNAL MEDICINE | Facility: CLINIC | Age: 65
End: 2020-03-17

## 2020-03-17 DIAGNOSIS — F90.9 ADHD (ATTENTION DEFICIT HYPERACTIVITY DISORDER): ICD-10-CM

## 2020-03-18 ENCOUNTER — COMMUNICATION - HEALTHEAST (OUTPATIENT)
Dept: INTERNAL MEDICINE | Facility: CLINIC | Age: 65
End: 2020-03-18

## 2020-04-15 ENCOUNTER — COMMUNICATION - HEALTHEAST (OUTPATIENT)
Dept: SCHEDULING | Facility: CLINIC | Age: 65
End: 2020-04-15

## 2020-04-15 DIAGNOSIS — F90.9 ADHD (ATTENTION DEFICIT HYPERACTIVITY DISORDER): ICD-10-CM

## 2020-04-21 ENCOUNTER — OFFICE VISIT - HEALTHEAST (OUTPATIENT)
Dept: INTERNAL MEDICINE | Facility: CLINIC | Age: 65
End: 2020-04-21

## 2020-04-21 DIAGNOSIS — F90.1 ATTENTION DEFICIT HYPERACTIVITY DISORDER (ADHD), PREDOMINANTLY HYPERACTIVE TYPE: ICD-10-CM

## 2020-04-21 DIAGNOSIS — E78.00 HYPERCHOLESTEREMIA: ICD-10-CM

## 2020-04-21 DIAGNOSIS — F30.9 BIPOLAR I DISORDER, SINGLE MANIC EPISODE (H): ICD-10-CM

## 2020-04-21 DIAGNOSIS — F17.209 TOBACCO USE DISORDER, CONTINUOUS: ICD-10-CM

## 2020-04-21 DIAGNOSIS — I10 ESSENTIAL HYPERTENSION: ICD-10-CM

## 2020-05-13 ENCOUNTER — COMMUNICATION - HEALTHEAST (OUTPATIENT)
Dept: INTERNAL MEDICINE | Facility: CLINIC | Age: 65
End: 2020-05-13

## 2020-05-13 DIAGNOSIS — F90.9 ADHD (ATTENTION DEFICIT HYPERACTIVITY DISORDER): ICD-10-CM

## 2020-05-29 ENCOUNTER — COMMUNICATION - HEALTHEAST (OUTPATIENT)
Dept: INTERNAL MEDICINE | Facility: CLINIC | Age: 65
End: 2020-05-29

## 2020-05-29 DIAGNOSIS — F31.75 BIPOLAR DISORDER, IN PARTIAL REMISSION, MOST RECENT EPISODE DEPRESSED (H): ICD-10-CM

## 2020-06-12 ENCOUNTER — COMMUNICATION - HEALTHEAST (OUTPATIENT)
Dept: INTERNAL MEDICINE | Facility: CLINIC | Age: 65
End: 2020-06-12

## 2020-06-12 DIAGNOSIS — F90.9 ADHD (ATTENTION DEFICIT HYPERACTIVITY DISORDER): ICD-10-CM

## 2020-07-13 ENCOUNTER — COMMUNICATION - HEALTHEAST (OUTPATIENT)
Dept: INTERNAL MEDICINE | Facility: CLINIC | Age: 65
End: 2020-07-13

## 2020-07-13 DIAGNOSIS — F90.9 ADHD (ATTENTION DEFICIT HYPERACTIVITY DISORDER): ICD-10-CM

## 2020-08-13 ENCOUNTER — COMMUNICATION - HEALTHEAST (OUTPATIENT)
Dept: INTERNAL MEDICINE | Facility: CLINIC | Age: 65
End: 2020-08-13

## 2020-08-13 DIAGNOSIS — F90.9 ADHD (ATTENTION DEFICIT HYPERACTIVITY DISORDER): ICD-10-CM

## 2020-09-15 ENCOUNTER — COMMUNICATION - HEALTHEAST (OUTPATIENT)
Dept: INTERNAL MEDICINE | Facility: CLINIC | Age: 65
End: 2020-09-15

## 2020-09-15 DIAGNOSIS — F90.9 ADHD (ATTENTION DEFICIT HYPERACTIVITY DISORDER): ICD-10-CM

## 2020-10-14 ENCOUNTER — COMMUNICATION - HEALTHEAST (OUTPATIENT)
Dept: INTERNAL MEDICINE | Facility: CLINIC | Age: 65
End: 2020-10-14

## 2020-10-14 DIAGNOSIS — F90.9 ADHD (ATTENTION DEFICIT HYPERACTIVITY DISORDER): ICD-10-CM

## 2020-11-03 ENCOUNTER — OFFICE VISIT - HEALTHEAST (OUTPATIENT)
Dept: INTERNAL MEDICINE | Facility: CLINIC | Age: 65
End: 2020-11-03

## 2020-11-03 DIAGNOSIS — F90.1 ATTENTION DEFICIT HYPERACTIVITY DISORDER (ADHD), PREDOMINANTLY HYPERACTIVE TYPE: ICD-10-CM

## 2020-11-03 DIAGNOSIS — I10 ESSENTIAL HYPERTENSION: ICD-10-CM

## 2020-11-03 DIAGNOSIS — F30.9 BIPOLAR I DISORDER, SINGLE MANIC EPISODE (H): ICD-10-CM

## 2020-11-03 DIAGNOSIS — F17.209 TOBACCO USE DISORDER, CONTINUOUS: ICD-10-CM

## 2020-11-09 ENCOUNTER — COMMUNICATION - HEALTHEAST (OUTPATIENT)
Dept: INTERNAL MEDICINE | Facility: CLINIC | Age: 65
End: 2020-11-09

## 2020-11-09 DIAGNOSIS — I10 HYPERTENSION: ICD-10-CM

## 2020-11-11 ENCOUNTER — COMMUNICATION - HEALTHEAST (OUTPATIENT)
Dept: INTERNAL MEDICINE | Facility: CLINIC | Age: 65
End: 2020-11-11

## 2020-11-13 ENCOUNTER — COMMUNICATION - HEALTHEAST (OUTPATIENT)
Dept: INTERNAL MEDICINE | Facility: CLINIC | Age: 65
End: 2020-11-13

## 2020-11-13 DIAGNOSIS — F90.9 ADHD (ATTENTION DEFICIT HYPERACTIVITY DISORDER): ICD-10-CM

## 2020-12-15 ENCOUNTER — COMMUNICATION - HEALTHEAST (OUTPATIENT)
Dept: INTERNAL MEDICINE | Facility: CLINIC | Age: 65
End: 2020-12-15

## 2020-12-15 DIAGNOSIS — F90.9 ADHD (ATTENTION DEFICIT HYPERACTIVITY DISORDER): ICD-10-CM

## 2021-01-13 ENCOUNTER — COMMUNICATION - HEALTHEAST (OUTPATIENT)
Dept: ADMINISTRATIVE | Facility: CLINIC | Age: 66
End: 2021-01-13

## 2021-01-13 DIAGNOSIS — F90.9 ADHD (ATTENTION DEFICIT HYPERACTIVITY DISORDER): ICD-10-CM

## 2021-02-08 ENCOUNTER — COMMUNICATION - HEALTHEAST (OUTPATIENT)
Dept: INTERNAL MEDICINE | Facility: CLINIC | Age: 66
End: 2021-02-08

## 2021-02-08 DIAGNOSIS — I10 HYPERTENSION: ICD-10-CM

## 2021-02-08 RX ORDER — LOSARTAN POTASSIUM AND HYDROCHLOROTHIAZIDE 25; 100 MG/1; MG/1
1 TABLET ORAL DAILY
Qty: 90 TABLET | Refills: 3 | Status: SHIPPED | OUTPATIENT
Start: 2021-02-08 | End: 2022-05-05

## 2021-02-11 ENCOUNTER — COMMUNICATION - HEALTHEAST (OUTPATIENT)
Dept: INTERNAL MEDICINE | Facility: CLINIC | Age: 66
End: 2021-02-11

## 2021-02-11 DIAGNOSIS — F90.9 ADHD (ATTENTION DEFICIT HYPERACTIVITY DISORDER): ICD-10-CM

## 2021-03-10 ENCOUNTER — COMMUNICATION - HEALTHEAST (OUTPATIENT)
Dept: INTERNAL MEDICINE | Facility: CLINIC | Age: 66
End: 2021-03-10

## 2021-03-10 DIAGNOSIS — F31.75 BIPOLAR DISORDER, IN PARTIAL REMISSION, MOST RECENT EPISODE DEPRESSED (H): ICD-10-CM

## 2021-03-10 DIAGNOSIS — R00.0 TACHYCARDIA: ICD-10-CM

## 2021-03-10 DIAGNOSIS — I10 ESSENTIAL HYPERTENSION, BENIGN: ICD-10-CM

## 2021-03-10 RX ORDER — METOPROLOL SUCCINATE 50 MG/1
TABLET, EXTENDED RELEASE ORAL
Qty: 90 TABLET | Refills: 3 | Status: SHIPPED | OUTPATIENT
Start: 2021-03-10 | End: 2022-04-20

## 2021-03-10 RX ORDER — QUETIAPINE FUMARATE 50 MG/1
TABLET, FILM COATED ORAL
Qty: 60 TABLET | Refills: 11 | Status: SHIPPED | OUTPATIENT
Start: 2021-03-10 | End: 2022-02-25

## 2021-03-13 ENCOUNTER — COMMUNICATION - HEALTHEAST (OUTPATIENT)
Dept: SCHEDULING | Facility: CLINIC | Age: 66
End: 2021-03-13

## 2021-03-15 ENCOUNTER — COMMUNICATION - HEALTHEAST (OUTPATIENT)
Dept: INTERNAL MEDICINE | Facility: CLINIC | Age: 66
End: 2021-03-15

## 2021-03-15 DIAGNOSIS — F90.9 ADHD (ATTENTION DEFICIT HYPERACTIVITY DISORDER): ICD-10-CM

## 2021-04-13 ENCOUNTER — COMMUNICATION - HEALTHEAST (OUTPATIENT)
Dept: INTERNAL MEDICINE | Facility: CLINIC | Age: 66
End: 2021-04-13

## 2021-04-22 ENCOUNTER — COMMUNICATION - HEALTHEAST (OUTPATIENT)
Dept: INTERNAL MEDICINE | Facility: CLINIC | Age: 66
End: 2021-04-22

## 2021-04-26 ENCOUNTER — OFFICE VISIT - HEALTHEAST (OUTPATIENT)
Dept: INTERNAL MEDICINE | Facility: CLINIC | Age: 66
End: 2021-04-26

## 2021-04-26 ENCOUNTER — COMMUNICATION - HEALTHEAST (OUTPATIENT)
Dept: INTERNAL MEDICINE | Facility: CLINIC | Age: 66
End: 2021-04-26

## 2021-04-26 DIAGNOSIS — J30.1 SEASONAL ALLERGIC RHINITIS DUE TO POLLEN: ICD-10-CM

## 2021-04-26 DIAGNOSIS — F90.9 ADHD (ATTENTION DEFICIT HYPERACTIVITY DISORDER): ICD-10-CM

## 2021-04-26 DIAGNOSIS — I10 ESSENTIAL HYPERTENSION: ICD-10-CM

## 2021-04-26 DIAGNOSIS — Z78.9 HEALTH MAINTENANCE ALTERATION: ICD-10-CM

## 2021-04-26 RX ORDER — LORATADINE 10 MG/1
10 TABLET ORAL DAILY
Qty: 30 TABLET | Refills: 11 | Status: SHIPPED | OUTPATIENT
Start: 2021-04-26 | End: 2021-08-30 | Stop reason: ALTCHOICE

## 2021-04-26 ASSESSMENT — PATIENT HEALTH QUESTIONNAIRE - PHQ9: SUM OF ALL RESPONSES TO PHQ QUESTIONS 1-9: 2

## 2021-05-16 ENCOUNTER — COMMUNICATION - HEALTHEAST (OUTPATIENT)
Dept: SCHEDULING | Facility: CLINIC | Age: 66
End: 2021-05-16

## 2021-05-16 DIAGNOSIS — I10 HYPERTENSION: ICD-10-CM

## 2021-05-17 RX ORDER — AMLODIPINE BESYLATE 5 MG/1
5 TABLET ORAL DAILY
Qty: 30 TABLET | Refills: 11 | Status: SHIPPED | OUTPATIENT
Start: 2021-05-17 | End: 2022-06-26

## 2021-05-25 ENCOUNTER — COMMUNICATION - HEALTHEAST (OUTPATIENT)
Dept: INTERNAL MEDICINE | Facility: CLINIC | Age: 66
End: 2021-05-25

## 2021-05-25 DIAGNOSIS — F90.9 ADHD (ATTENTION DEFICIT HYPERACTIVITY DISORDER): ICD-10-CM

## 2021-05-25 RX ORDER — DEXTROAMPHETAMINE SACCHARATE, AMPHETAMINE ASPARTATE MONOHYDRATE, DEXTROAMPHETAMINE SULFATE AND AMPHETAMINE SULFATE 5; 5; 5; 5 MG/1; MG/1; MG/1; MG/1
40 CAPSULE, EXTENDED RELEASE ORAL EVERY MORNING
Qty: 60 CAPSULE | Refills: 0 | Status: SHIPPED | OUTPATIENT
Start: 2021-05-25 | End: 2021-08-24

## 2021-05-27 ASSESSMENT — PATIENT HEALTH QUESTIONNAIRE - PHQ9: SUM OF ALL RESPONSES TO PHQ QUESTIONS 1-9: 2

## 2021-05-27 NOTE — TELEPHONE ENCOUNTER
Question following Office Visit  When did you see your provider: 4/16  What is your question: Patient contact his insurance and they will cover the Shingles vaccination. Patient asking when he could come into get this? Please advise!  Okay to leave a detailed message: Yes

## 2021-05-27 NOTE — TELEPHONE ENCOUNTER
Attempted to contact patient to let him know that it is his responsibility to call his insurance to check and he can be added to our waitlist if he would like it.

## 2021-05-27 NOTE — TELEPHONE ENCOUNTER
Prior Authorization Request  Who s requesting:  Patient  Pharmacy Name and Location: [facility] #[id number]  Medication Name: dextroamphetamine-amphetamine (ADDERALL XR) 20 MG 24 hr capsule  Insurance Plan: [facility]  Insurance Member ID Number:  [id number]  Informed patient that prior authorizations can take up to 10 business days for response:   Yes  Okay to leave a detailed message: No    PA renewal on medication for patient taking 2 capsules daily.

## 2021-05-27 NOTE — TELEPHONE ENCOUNTER
Patient reports due to needing a PA renewal he was only able to get 30 tablets off last RX and will need a new RX placed for fill date of 4/1/2019.    PA started in another encounter for PA renewal for patient.    Controlled Substance Refill Request  Medication Name:   Requested Prescriptions     Pending Prescriptions Disp Refills     dextroamphetamine-amphetamine (ADDERALL XR) 20 MG 24 hr capsule 60 capsule 0     Sig: Take 2 capsules (40 mg total) by mouth every morning.     Date Last Fill: 3/14/2019  Pharmacy: [facility] #[id number]      Submit electronically to pharmacy  Controlled Substance Agreement Date Scanned:   Encounter-Level CSA Scan Date - 04/10/2018:    Scan on 4/17/2018 11:37 AM (below)             Encounter-Level CSA Scan Date - 09/28/2017:    Scan on 10/2/2017  1:45 PM (below)             Encounter-Level CSA Scan Date - 07/25/2016:    Scan on 7/27/2016 10:41 AM: CONTROLLED SUBSTANCE 7/25/2016 (below)         Last office visit with prescriber/PCP: 10/11/2018 Alejandro Villalpando MD OR same dept: 10/11/2018 Alejandro Villalpando MD OR same specialty: 10/11/2018 Alejandro Villalpando MD  Last physical: Visit date not found Last MTM visit: Visit date not found

## 2021-05-27 NOTE — PATIENT INSTRUCTIONS - HE
1.  Complete cologuard    2.  Same medications.    3.  Work on smoking cessation    4.  See in six months.  Fasting labs next appointment

## 2021-05-27 NOTE — TELEPHONE ENCOUNTER
Central PA team  [phone]  Pool: HE PA MED ([id number])          PA has been initiated.       PA form completed and faxed insurance via Cover My Meds     Key:  [id number]     Medication:  ADDERALL XR 20MG    Insurance:  [employer]        Response will be received via fax and may take up to 5-10 business days depending on plan

## 2021-05-27 NOTE — PROGRESS NOTES
ASSESSMENT:  1.  Attention deficit disorder:  Les remains on Adderall 40 mg daily.  He feels this helps with concentration.  He denies adverse effects from the medication.  Refill was authorized.  He should be seen back in 6 months.    2.  Bipolar affective disorder:  Socrates uses Seroquel at night.  He is concerned about risks for weight gain, but overall is doing fairly well    3.  Essential hypertension:  Blood pressure has been acceptable on current regimen.  He will continue current medications    4.  Tobacco use  Smoking cessation is encouraged.    5.  Hypercholesterolemia:  Cholesterol was lower than in the past when it was last checked.  This should be rechecked at next clinic appointment    6.  Health maintenance  : Guard was ordered at last clinic visit but has not yet been completed.  He was encouraged to complete the test.  PLAN:  Patient Instructions   1.  Complete cologuard    2.  Same medications.    3.  Work on smoking cessation    4.  See in six months.  Fasting labs next appointment        There are no discontinued medications.    Return in about 6 months (around 10/16/2019) for Annual physical.      ASSESSED PROBLEMS:  No diagnosis found.    CHIEF COMPLAINT:  Chief Complaint   Patient presents with     Follow-up     6 months       HISTORY OF PRESENT ILLNESS:  Socrates is a 63 y.o. male presenting to the clinic today for a medication check. He does not have any acute concerns today.     Tobacco Abuse: He continues to smoke. He still has Nicorette gum but has not tried using it yet. He is concerned about possible side effects but would be willing to give it a try.     Hypertension: His blood pressure has been in a good range, and he has not had any adverse side effects to his medications.      ADHD: He continues to manage well on his current dose of Adderall.     Health Maintenance: He has a Cologuard kit at home but has not completed it yet. He has not checked with his insurance about the Shingrix vaccine  "yet.     REVIEW OF SYSTEMS:   His cholesterol levels improved the last time they were checked. He quit eating ice cream and wonders if that helped. He has gained a little weight. He continues to work out and thinks this could be related to medications. His ankles occasionally swell if he sits for too long. He takes a few supplements, including cinnamon on a daily basis. He has been considering Lasik eye surgery because he does not like his glasses. All other systems are negative.    PFSH:  He is waiting for the snow to melt so he can ride his bike. He spends some time studying and doing research.     TOBACCO USE:  Social History     Tobacco Use   Smoking Status Current Every Day Smoker     Packs/day: 0.25     Types: Cigarettes   Smokeless Tobacco Never Used       VITALS:  Vitals:    04/16/19 0935 04/16/19 1014   BP: 130/86 140/78   Patient Site: Left Arm    Patient Position: Sitting    Cuff Size: Adult Regular    Pulse: 84    SpO2: 98%    Weight: 181 lb (82.1 kg)    Height: 5' 9\" (1.753 m)      Wt Readings from Last 3 Encounters:   04/16/19 181 lb (82.1 kg)   10/11/18 162 lb 1.6 oz (73.5 kg)   04/25/18 178 lb (80.7 kg)     Body mass index is 26.73 kg/m .    PHYSICAL EXAM:  Constitutional:   Reveals an alert, pleasant, talkative man. Affect appropriate. Does not appear acutely ill. Smells of tobacco. Vitals: per nursing notes.  Repeat BP by MD: 140/78  HEENT: Atraumatic.   Oropharynx: Poor dentition. Teeth are tobacco stained.  Neck:  Supple, no carotid bruits or adenopathy.  Back:  No spine or CVA pain.  Thorax:  No bony deformities.  Lungs: Clear to A&P without rales or wheezes.  Respiratory effort normal.  Cardiac:   Regular rate and rhythm, normal S1, S2, no murmur or gallop.  Abdomen:  Soft, active bowel sounds without bruits, mass, or tenderness.  Extremities:   No peripheral edema.    Skin: Clear  Neuro:  Alert and talkative. No gross focal deficits.  Psychiatric:  Memory intact, mood appropriate.    I have " counseled the patient for tobacco cessation and the follow up will occur  at the next visit.    ADDITIONAL HISTORY SUMMARIZED (2): None.  DECISION TO OBTAIN EXTRA INFORMATION (1): None.   RADIOLOGY TESTS (1): None.  LABS (1): Labs from 10/11/2018 reviewed - lipids improved   MEDICINE TESTS (1): None.  INDEPENDENT REVIEW (2 each): None.     The visit lasted a total of 13 minutes face to face with the patient. Over 50% of the time was spent counseling and educating the patient about his medications. A total of 5 minutes was spent discussing tobacco cessation.     IMisha, am scribing for and in the presence of, Dr. Villalpando.    Alejandro DESIR, personally performed the services described in this documentation, as scribed by Misha Astudillo in my presence, and it is both accurate and complete.    Dragon dictation was used for this note.  Speech recognition errors are a possibility.    MEDICATIONS:  Current Outpatient Medications   Medication Sig Dispense Refill     amLODIPine (NORVASC) 5 MG tablet TAKE 1 TABLET(5 MG) BY MOUTH DAILY 90 tablet 2     artificial tears, hypromellose, (GONIOVISC) 2.5 % ophthalmic solution Administer 1-2 drops to both eyes 4 (four) times a day as needed. 15 mL 12     dextroamphetamine-amphetamine (ADDERALL XR) 20 MG 24 hr capsule Take 2 capsules (40 mg total) by mouth every morning. 60 capsule 0     ibuprofen (ADVIL,MOTRIN) 600 MG tablet Take 1 tablet (600 mg total) by mouth 3 (three) times a day. 90 tablet 3     losartan-hydrochlorothiazide (HYZAAR) 100-25 mg per tablet TAKE 1 TABLET BY MOUTH EVERY DAY 90 tablet 2     penicillin VK (PEN VK) 500 MG tablet Take 1 tablet (500 mg total) by mouth 2 (two) times a day. 14 tablet 0     QUEtiapine (SEROQUEL) 50 MG tablet One to two tabs at bedtime as needed 60 tablet 7     No current facility-administered medications for this visit.        Total data points: 1

## 2021-05-28 NOTE — TELEPHONE ENCOUNTER
Controlled Substance Refill Request  Medication Name:   Requested Prescriptions     Pending Prescriptions Disp Refills     dextroamphetamine-amphetamine (ADDERALL XR) 20 MG 24 hr capsule 60 capsule 0     Sig: Take 2 capsules (40 mg total) by mouth every morning.     Date Last Fill: 3/28/19  Pharmacy: [facility] [address]       Submit electronically to pharmacy  Controlled Substance Agreement Date Scanned:   Encounter-Level CSA Scan Date - 04/10/2018:    Scan on 4/17/2018 11:37 AM (below)             Encounter-Level CSA Scan Date - 09/28/2017:    Scan on 10/2/2017  1:45 PM (below)             Encounter-Level CSA Scan Date - 07/25/2016:    Scan on 7/27/2016 10:41 AM: CONTROLLED SUBSTANCE 7/25/2016 (below)         Last office visit with prescriber/PCP: 4/16/2019 Alejandro Villalpando MD OR same dept: 4/16/2019 Alejandro Villalpando MD OR same specialty: 4/16/2019 Alejandro Villalpando MD  Last physical: Visit date not found Last MTM visit: Visit date not found

## 2021-05-29 NOTE — TELEPHONE ENCOUNTER
Controlled Substance Refill Request  Medication Name:   Requested Prescriptions     Pending Prescriptions Disp Refills     dextroamphetamine-amphetamine (ADDERALL XR) 20 MG 24 hr capsule 60 capsule 0     Sig: Take 2 capsules (40 mg total) by mouth every morning.     Date Last Fill: 5/2/2019  Pharmacy: [id number] - [place], [place]       Submit electronically to pharmacy  Controlled Substance Agreement Date Scanned:   Encounter-Level CSA Scan Date - 04/10/2018:    Scan on 4/17/2018 11:37 AM (below)             Encounter-Level CSA Scan Date - 09/28/2017:    Scan on 10/2/2017  1:45 PM (below)             Encounter-Level CSA Scan Date - 07/25/2016:    Scan on 7/27/2016 10:41 AM: CONTROLLED SUBSTANCE 7/25/2016 (below)         Last office visit with prescriber/PCP: 4/16/2019 Alejandro Villalpando MD OR same dept: 4/16/2019 Alejandro Villalpando MD OR same specialty: 4/16/2019 Alejandro Villalpando MD  Last physical: Visit date not found Last MTM visit: Visit date not found

## 2021-05-30 ENCOUNTER — RECORDS - HEALTHEAST (OUTPATIENT)
Dept: ADMINISTRATIVE | Facility: CLINIC | Age: 66
End: 2021-05-30

## 2021-05-30 NOTE — TELEPHONE ENCOUNTER
Controlled Substance Refill Request  Medication Name:   Requested Prescriptions     Pending Prescriptions Disp Refills     dextroamphetamine-amphetamine (ADDERALL XR) 20 MG 24 hr capsule 60 capsule 0     Sig: Take 2 capsules (40 mg total) by mouth every morning.     Date Last Fill: 5/29/19  Pharmacy:  [facility] #[id number]     Submit electronically to pharmacy  Controlled Substance Agreement Date Scanned:   Encounter-Level CSA Scan Date - 04/10/2018:    Scan on 4/17/2018 11:37 AM (below)             Encounter-Level CSA Scan Date - 09/28/2017:    Scan on 10/2/2017  1:45 PM (below)             Encounter-Level CSA Scan Date - 07/25/2016:    Scan on 7/27/2016 10:41 AM: CONTROLLED SUBSTANCE 7/25/2016 (below)         Last office visit with prescriber/PCP: 4/16/2019 Alejandro Villalpando MD OR same dept: 4/16/2019 Alejandro Villalpando MD OR same specialty: 4/16/2019 Alejandro Villalpando MD  Last physical: Visit date not found Last MTM visit: Visit date not found      Please expedite as patient has 1 day left of medication.

## 2021-05-30 NOTE — TELEPHONE ENCOUNTER
Patient Returning Call  Reason for call: Patient returning call to check on the status of this request.  Information relayed to patient:  Pending providers review and approval.  Patient has additional questions:  No  If YES, what are your questions/concerns:  none  Okay to leave a detailed message?: Yes

## 2021-05-30 NOTE — TELEPHONE ENCOUNTER
Patient Returning Call  Reason for call:  Patient is returning call   Information relayed to patient:  Message below regarding medication being sent to the pharmacy.  Patient has additional questions:  No  If YES, what are your questions/concerns:  N/a  Okay to leave a detailed message?: No call back needed

## 2021-05-30 NOTE — TELEPHONE ENCOUNTER
Controlled Substance Refill Request  Medication:   Requested Prescriptions     Pending Prescriptions Disp Refills     dextroamphetamine-amphetamine (ADDERALL XR) 20 MG 24 hr capsule 60 capsule 0     Sig: Take 2 capsules (40 mg total) by mouth every morning.     Date Last Fill: 6/28/2019  Pharmacy: [facility]   Submit electronically to pharmacy  Controlled Substance Agreement on File:   Encounter-Level CSA Scan Date - 04/10/2018:    Scan on 4/17/2018 11:37 AM (below)             Encounter-Level CSA Scan Date - 09/28/2017:    Scan on 10/2/2017  1:45 PM (below)             Encounter-Level CSA Scan Date - 07/25/2016:    Scan on 7/27/2016 10:41 AM: CONTROLLED SUBSTANCE 7/25/2016 (below)         Last office visit: 4/16/2019 Alejandro Villalpando MD

## 2021-05-30 NOTE — TELEPHONE ENCOUNTER
Refill Approved    Rx renewed per Medication Renewal Policy. Medication was last renewed on 8/29/18.    Cherry Baig, [facility] Triage/Med Refill 6/27/2019     Requested Prescriptions   Pending Prescriptions Disp Refills     amLODIPine (NORVASC) 5 MG tablet [Pharmacy Med Name: AMLODIPINE BESYLATE 5MG TABLETS] 90 tablet 0     Sig: TAKE 1 TABLET(5 MG) BY MOUTH DAILY       Calcium-Channel Blockers Protocol Passed - 6/27/2019  3:15 AM        Passed - PCP or prescribing provider visit in past 12 months or next 3 months     Last office visit with prescriber/PCP: 4/16/2019 Alejandro Villalpando MD OR same dept: 4/16/2019 Alejandro Villalpando MD OR same specialty: 4/16/2019 Alejandro Vilallpando MD  Last physical: Visit date not found Last MTM visit: Visit date not found   Next visit within 3 mo: Visit date not found  Next physical within 3 mo: Visit date not found  Prescriber OR PCP: Alejandro Villalpando MD  Last diagnosis associated with med order: 1. Hypertension  - amLODIPine (NORVASC) 5 MG tablet [Pharmacy Med Name: AMLODIPINE BESYLATE 5MG TABLETS]; TAKE 1 TABLET(5 MG) BY MOUTH DAILY  Dispense: 90 tablet; Refill: 0    If protocol passes may refill for 12 months if within 3 months of last provider visit (or a total of 15 months).             Passed - Blood pressure filed in past 12 months     BP Readings from Last 1 Encounters:   04/16/19 140/78

## 2021-05-31 ENCOUNTER — RECORDS - HEALTHEAST (OUTPATIENT)
Dept: ADMINISTRATIVE | Facility: CLINIC | Age: 66
End: 2021-05-31

## 2021-05-31 VITALS — HEIGHT: 69 IN | WEIGHT: 171.8 LBS | BODY MASS INDEX: 25.45 KG/M2

## 2021-05-31 NOTE — TELEPHONE ENCOUNTER
Controlled Substance Refill Request  Medication Name:   Requested Prescriptions     Pending Prescriptions Disp Refills     dextroamphetamine-amphetamine (ADDERALL XR) 20 MG 24 hr capsule 60 capsule 0     Sig: Take 2 capsules (40 mg total) by mouth every morning.     Date Last Fill: 7/26/19  Pharmacy: [facility] #[id number]      Submit electronically to pharmacy  Controlled Substance Agreement Date Scanned:   Encounter-Level CSA Scan Date - 04/10/2018:    Scan on 4/17/2018 11:37 AM (below)             Encounter-Level CSA Scan Date - 09/28/2017:    Scan on 10/2/2017  1:45 PM (below)             Encounter-Level CSA Scan Date - 07/25/2016:    Scan on 7/27/2016 10:41 AM: CONTROLLED SUBSTANCE 7/25/2016 (below)         Last office visit with prescriber/PCP: 4/16/2019 Alejandro Villalpando MD OR same dept: 4/16/2019 Alejandro Villalpando MD OR same specialty: 4/16/2019 Alejandro Villalpando MD  Last physical: Visit date not found Last MTM visit: Visit date not found

## 2021-06-01 ENCOUNTER — RECORDS - HEALTHEAST (OUTPATIENT)
Dept: ADMINISTRATIVE | Facility: CLINIC | Age: 66
End: 2021-06-01

## 2021-06-01 VITALS — WEIGHT: 169.3 LBS | BODY MASS INDEX: 25 KG/M2

## 2021-06-01 NOTE — TELEPHONE ENCOUNTER
Patient stated he is due on 9/22/19.    Controlled Substance Refill Request  Medication Name:   Requested Prescriptions     Pending Prescriptions Disp Refills     dextroamphetamine-amphetamine (ADDERALL XR) 20 MG 24 hr capsule 60 capsule 0     Sig: Take 2 capsules (40 mg total) by mouth every morning.     Date Last Fill: 8/26/19  Pharmacy: [facility] #[id number]      Submit electronically to pharmacy  Controlled Substance Agreement Date Scanned:   Encounter-Level CSA Scan Date - 04/10/2018:    Scan on 4/17/2018 11:37 AM (below)             Encounter-Level CSA Scan Date - 09/28/2017:    Scan on 10/2/2017  1:45 PM (below)             Encounter-Level CSA Scan Date - 07/25/2016:    Scan on 7/27/2016 10:41 AM: CONTROLLED SUBSTANCE 7/25/2016 (below)         Last office visit with prescriber/PCP: 4/16/2019 Alejandro Villalpando MD OR same dept: 4/16/2019 Alejandro Villalpando MD OR same specialty: 4/16/2019 Alejandro Villalpando MD  Last physical: Visit date not found Last MTM visit: Visit date not found

## 2021-06-02 VITALS — WEIGHT: 181 LBS | BODY MASS INDEX: 26.81 KG/M2 | HEIGHT: 69 IN

## 2021-06-02 VITALS — HEIGHT: 69 IN | BODY MASS INDEX: 24.01 KG/M2 | WEIGHT: 162.1 LBS

## 2021-06-02 NOTE — PATIENT INSTRUCTIONS - HE
1.  Start Metoprolol XL 50 mg daily in evening    2.  .  Report BP in one month.  See me in three months    3.  He will be notified of laboratory test results.    4.  Use nicotine patch 21 mg to skin daily along with Nicorette gum.  May remove the patch at night    5.  Flu shot was advised, but declined

## 2021-06-02 NOTE — PROGRESS NOTES
ASSESSMENT:  1. ADHD (attention deficit hyperactivity disorder)  Less remains on Adderall.  He feels it has been of substantial benefit.  Controlled substance agreement was discussed and filled out.  He will have a urine tox screen sent.  He denies use of any controlled substances, but does use marijuana on and off  - dextroamphetamine-amphetamine (ADDERALL XR) 20 MG 24 hr capsule; Take 2 capsules (40 mg total) by mouth every morning.  Dispense: 60 capsule; Refill: 0  - Drug Abuse 1+, Urine    2. Hypercholesterolemia  Fasting lipid cascade will be checked  - Lipid Cascade    3. Screening for prostate cancer  PSA is checked  - PSA (Prostatic-Specific Antigen), Annual Screen    4. Essential hypertension, benign  Blood pressure was substantially elevated in clinic today.  He reports home readings have generally been good.  He also was noted to have sinus tachycardia.  He will start metoprolol.  Thyroid function also will be checked  - Comprehensive Metabolic Panel  - metoprolol succinate (TOPROL XL) 50 MG 24 hr tablet; Take 1 tablet (50 mg total) by mouth daily.  Dispense: 30 tablet; Refill: 11    5. Encounter for tobacco use cessation counseling  He is still smoking 3/4 to 1 pack of cigarettes daily.  Options for smoking cessation were discussed.  He is most interested in the nicotine replacement product.  He was advised to try the combination of a nicotine patch and Nicorette gum  - nicotine (NICODERM CQ) 21 mg/24 hr; Place 1 patch on the skin daily.  Dispense: 30 patch; Refill: 1    6. Encounter for therapeutic drug monitoring  Monitoring labs are checked as outlined  - Comprehensive Metabolic Panel  - HM2(CBC w/o Differential)  - Drug Abuse 1+, Urine    7. Tachycardia  He initially was noted to have a rapid regular tachycardia with a rate of 140 on exam.  An EKG was done which revealed sinus tachycardia without ischemic changes or conduction abnormalities.  As noted above, he will be started on metoprolol.  I  suspect the tachycardia is a stress response.  - Electrocardiogram Perform - Clinic  - Thyroid Stimulating Hormone (TSH)  - metoprolol succinate (TOPROL XL) 50 MG 24 hr tablet; Take 1 tablet (50 mg total) by mouth daily.  Dispense: 30 tablet; Refill: 11    8.  Bipolar disorder  He remains on Seroquel at bedtime and feels that he is overall doing well.    PLAN:  Patient Instructions   1.  Start Metoprolol XL 50 mg daily in evening    2.  .  Report BP in one month.  See me in three months    3.  Addendum: His blood sugar returned elevated at 128.  A hemoglobin A1c should be checked at his next appointment.    4.  Flu shot was advised but declined    5.  Try the combination of a nicotine patch and Nicorette gum for smoking cessation.  He may take the patch off at night.    6.  He will be notified of laboratory test results        Orders Placed This Encounter   Procedures     Lipid Cascade     Order Specific Question:   Fasting is required?     Answer:   Unknown     PSA (Prostatic-Specific Antigen), Annual Screen     Comprehensive Metabolic Panel     HM2(CBC w/o Differential)     Drug Abuse 1+, Urine     Thyroid Stimulating Hormone (TSH)     Electrocardiogram Perform - Clinic     Medications Discontinued During This Encounter   Medication Reason     dextroamphetamine-amphetamine (ADDERALL XR) 20 MG 24 hr capsule Reorder       Return in about 3 months (around 1/18/2020) for Recheck.      ASSESSED PROBLEMS:  1. Hypercholesterolemia  Lipid Cascade   2. ADHD (attention deficit hyperactivity disorder)  dextroamphetamine-amphetamine (ADDERALL XR) 20 MG 24 hr capsule    Drug Abuse 1+, Urine   3. Screening for prostate cancer  PSA (Prostatic-Specific Antigen), Annual Screen   4. Essential hypertension, benign  Comprehensive Metabolic Panel    metoprolol succinate (TOPROL XL) 50 MG 24 hr tablet   5. Encounter for tobacco use cessation counseling  nicotine (NICODERM CQ) 21 mg/24 hr   6. Encounter for therapeutic drug monitoring   Comprehensive Metabolic Panel    HM2(CBC w/o Differential)    Drug Abuse 1+, Urine   7. Tachycardia  Electrocardiogram Perform - Clinic    Thyroid Stimulating Hormone (TSH)    metoprolol succinate (TOPROL XL) 50 MG 24 hr tablet       CHIEF COMPLAINT:  Chief Complaint   Patient presents with     Medication Management     CSA signed        HISTORY OF PRESENT ILLNESS:  Socrates is a 64 y.o. male presenting to the clinic today to follow up on hypertension and tobacco use.     Hypertension: His blood pressure is elevated in the clinic today, but he states that it is always okay when he checks outside of the clinic. He has not run out of any medications. He did have a couple of cigarettes this morning and wonders if that could be the reason for his blood pressure being elevated. He states he also feels a little anxious coming into the doctor.     Tobacco Use: He continues to smoke and has actually been smoking more lately. He plans to quit with nicotine gum by the next time he comes here. He might try the patch as well. He smokes about 15-20 cigarettes per day currently. He has quit in the past, so he is confident he could do it again.     Tachycardia: He occasionally notices his pulse going fast and also notes feeling pauses from time to time. He does not recall whether the pulse slows down gradually or abruptly when it returns to normal. He is tachycardic in the clinic today and states he feels somewhat anxious.     Insomnia: He continues to take Seroquel at night, and his sleep has been good, but he thinks he needs the medication.    ADD: He continues to take Adderall, which seems to help concentration.      Bipolar Disorder: His mood has been okay.     REVIEW OF SYSTEMS:   He is fasting today. He denies extremity swelling. He feels somewhat anxious. He denies feeling dizzy. All other systems are negative.    PFSH:  He has been riding his bike a lot. He is looking for work but has not been able to find what he is looking  "for. He likes computers and is considering going back to school. He continues to smoke. He is not drinking any alcohol.     TOBACCO USE:  Social History     Tobacco Use   Smoking Status Current Every Day Smoker     Packs/day: 0.25     Types: Cigarettes   Smokeless Tobacco Never Used       VITALS:  Vitals:    10/18/19 0916 10/18/19 0930 10/18/19 0952 10/18/19 1023   BP: (!) 184/106 (!) 180/102 (!) 188/100 (!) 160/105   Patient Site: Left Arm Left Arm Right Arm Right Arm   Patient Position: Sitting Sitting Sitting Sitting   Cuff Size: Adult Regular Adult Large     Pulse: (!) 140      SpO2: 98%      Weight: 185 lb (83.9 kg)      Height: 5' 9\" (1.753 m)        Wt Readings from Last 3 Encounters:   10/18/19 185 lb (83.9 kg)   04/16/19 181 lb (82.1 kg)   10/11/18 162 lb 1.6 oz (73.5 kg)     Body mass index is 27.32 kg/m .    PHYSICAL EXAM:  Constitutional:   Reveals an alert, talkative man. Affect appropriate. Does not appear acutely ill.  Vitals: per nursing notes.  Repeat BP by MD: 188/100, RUE, sitting, pulse around 140 but regular.   Repeat BP by MD: 160/105, RUE, sitting  HEENT: Atraumatic  Eyes: No conjunctival hyperemia.   Oropharynx:   Minor periodontal disease. Posterior oropharynx clear.   Neck:  Supple, no carotid bruits or adenopathy.  Back:  No spine or CVA pain.  Thorax:  No bony deformities.  Lungs: Clear to A&P without rales or wheezes.  Cardiac: Tachycardic, regular rhythm. Rate around 140. No murmurs.   Abdomen:  Soft, no masses.   Extremities:   No peripheral edema, pulses in the feet intact.    Skin:  Clear  Neuro:  No gross focal deficits. Detailed exam not done  Psychiatric:  Memory intact, mood appropriate.    EKG: Sinus tachycardia, rate 112, delayed anterior R-wave progression, no acute appearing ischemic changes.     QUALITY MEASURES:  I have counseled the patient for tobacco cessation and the follow up will occur  at the next visit.    ADDITIONAL HISTORY SUMMARIZED (2): None.  DECISION TO OBTAIN " EXTRA INFORMATION (1): None.   RADIOLOGY TESTS (1): None.  LABS (1): Labs from 10/11/2018 reviewed. Labs ordered.   MEDICINE TESTS (1): EKG ordered.   INDEPENDENT REVIEW (2 each): EKG from today personally read.      The visit lasted a total of 20 minutes face to face with the patient. Over 50% of the time was spent counseling and educating the patient about his hypertension and tachycardia. An additional 5 minutes was spent discussing tobacco cessation.     IMisha, am scribing for and in the presence of, Dr. Villalpando.    IAlejandro, personally performed the services described in this documentation, as scribed by Misha Astudillo in my presence, and it is both accurate and complete.    Dragon dictation was used for this note.  Speech recognition errors are a possibility.    MEDICATIONS:  Current Outpatient Medications   Medication Sig Dispense Refill     amLODIPine (NORVASC) 5 MG tablet TAKE 1 TABLET(5 MG) BY MOUTH DAILY 90 tablet 2     dextroamphetamine-amphetamine (ADDERALL XR) 20 MG 24 hr capsule Take 2 capsules (40 mg total) by mouth every morning. 60 capsule 0     ibuprofen (ADVIL,MOTRIN) 600 MG tablet Take 1 tablet (600 mg total) by mouth 3 (three) times a day. 90 tablet 3     losartan-hydrochlorothiazide (HYZAAR) 100-25 mg per tablet TAKE 1 TABLET BY MOUTH EVERY DAY 90 tablet 2     QUEtiapine (SEROQUEL) 50 MG tablet TAKE 1 TO 2 TABLETS BY MOUTH AT BEDTIME AS NEEDED 60 tablet 7     artificial tears, hypromellose, (GONIOVISC) 2.5 % ophthalmic solution Administer 1-2 drops to both eyes 4 (four) times a day as needed. 15 mL 12     metoprolol succinate (TOPROL XL) 50 MG 24 hr tablet Take 1 tablet (50 mg total) by mouth daily. 30 tablet 11     nicotine (NICODERM CQ) 21 mg/24 hr Place 1 patch on the skin daily. 30 patch 1     penicillin VK (PEN VK) 500 MG tablet Take 1 tablet (500 mg total) by mouth 2 (two) times a day. 14 tablet 0     No current facility-administered medications for this visit.         Total data points: 4

## 2021-06-02 NOTE — TELEPHONE ENCOUNTER
RN cannot approve Refill Request    RN can NOT refill this medication med is not covered by policy/route to provider. Last office visit: Visit date not found Last Physical: Visit date not found Last MTM visit: Visit date not found Last visit same specialty: 4/16/2019 Alejandro Villalpando MD.  Next visit within 3 mo: Visit date not found  Next physical within 3 mo: Visit date not found      Jazlyn Johnson, [facility] Triage/Med Refill 10/15/2019    Requested Prescriptions   Pending Prescriptions Disp Refills     QUEtiapine (SEROQUEL) 50 MG tablet [Pharmacy Med Name: QUETIAPINE 50MG TABLETS] 60 tablet 7     Sig: TAKE 1 TO 2 TABLETS BY MOUTH AT BEDTIME AS NEEDED       There is no refill protocol information for this order

## 2021-06-03 VITALS
DIASTOLIC BLOOD PRESSURE: 105 MMHG | HEART RATE: 140 BPM | HEIGHT: 69 IN | SYSTOLIC BLOOD PRESSURE: 160 MMHG | WEIGHT: 185 LBS | OXYGEN SATURATION: 98 % | BODY MASS INDEX: 27.4 KG/M2

## 2021-06-03 NOTE — TELEPHONE ENCOUNTER
Patient has enough medication to last to 11/17/19.        Controlled Substance Refill Request  Medication Name:   Requested Prescriptions     Pending Prescriptions Disp Refills     dextroamphetamine-amphetamine (ADDERALL XR) 20 MG 24 hr capsule 60 capsule 0     Sig: Take 2 capsules (40 mg total) by mouth every morning.     Date Last Fill: 10/18/19  Pharmacy: [id number]   Submit electronically to pharmacy  Controlled Substance Agreement Date Scanned:   Encounter-Level CSA Scan Date - 04/10/2018:    Scan on 4/17/2018 11:37 AM           Encounter-Level CSA Scan Date - 09/28/2017:    Scan on 10/2/2017  1:45 PM           Encounter-Level CSA Scan Date - 07/25/2016:    Scan on 7/27/2016 10:41 AM: CONTROLLED SUBSTANCE 7/25/2016       Last office visit with prescriber/PCP: 10/18/2019 Alejandro Villalpando MD OR same dept: 10/18/2019 Alejandro Villalpando MD OR same specialty: 10/18/2019 Alejandro Villalpando MD  Last physical: Visit date not found Last MTM visit: Visit date not found

## 2021-06-03 NOTE — TELEPHONE ENCOUNTER
B/p looks good to me,continue current medication regimen.  Check in with Dr GIORDANO in 2 weeks if b/p >130/80.   None

## 2021-06-03 NOTE — TELEPHONE ENCOUNTER
Question following Office Visit  When did you see your provider: 10/18/19  What is your question: His blood pressure has been stable at around 118/80 and tolerated the newly added metoprolol 50 mg.  Does Dr. Villalpando want him to make any medication changes?  Okay to leave a detailed message: Yes

## 2021-06-03 NOTE — TELEPHONE ENCOUNTER
Left pt detailed message relaying Dr. Cisneros's message.  Advised pt to call back with further questions.

## 2021-06-03 NOTE — TELEPHONE ENCOUNTER
Who is calling:  Patient   Reason for Call:  Patient is calling in for follow up on below refill message . Patient is requesting to process as soon as possible.  Date of last appointment with primary care: 10/18/19  Okay to leave a detailed message: No

## 2021-06-04 VITALS
BODY MASS INDEX: 27.4 KG/M2 | OXYGEN SATURATION: 98 % | SYSTOLIC BLOOD PRESSURE: 132 MMHG | DIASTOLIC BLOOD PRESSURE: 84 MMHG | WEIGHT: 185 LBS | HEART RATE: 98 BPM | HEIGHT: 69 IN

## 2021-06-04 NOTE — TELEPHONE ENCOUNTER
Refill Approved    Rx renewed per Medication Renewal Policy. Medication was last renewed on 2/4/19    Kenzie Magana, [facility] Triage/Med Refill 12/9/2019     Requested Prescriptions   Pending Prescriptions Disp Refills     losartan-hydrochlorothiazide (HYZAAR) 100-25 mg per tablet [Pharmacy Med Name: LOSARTAN/HCTZ 100/25MG TABLETS] 90 tablet 0     Sig: Take 1 tablet by mouth daily.       Diuretics/Combination Diuretics Refill Protocol  Passed - 12/9/2019  5:16 AM        Passed - Visit with PCP or prescribing provider visit in past 12 months     Last office visit with prescriber/PCP: 10/18/2019 Alejandro Villalpando MD OR same dept: 10/18/2019 Alejandro Villalpando MD OR same specialty: 10/18/2019 Alejandro Villalpando MD  Last physical: Visit date not found Last MTM visit: Visit date not found   Next visit within 3 mo: Visit date not found  Next physical within 3 mo: Visit date not found  Prescriber OR PCP: Alejandro Villalpando MD  Last diagnosis associated with med order: 1. Hypertension  - losartan-hydrochlorothiazide (HYZAAR) 100-25 mg per tablet [Pharmacy Med Name: LOSARTAN/HCTZ 100/25MG TABLETS]; TAKE 1 TABLET BY MOUTH EVERY DAY  Dispense: 90 tablet; Refill: 0    If protocol passes may refill for 12 months if within 3 months of last provider visit (or a total of 15 months).             Passed - Serum Potassium in past 12 months      Lab Results   Component Value Date    Potassium 4.4 10/18/2019             Passed - Serum Sodium in past 12 months      Lab Results   Component Value Date    Sodium 140 10/18/2019             Passed - Blood pressure on file in past 12 months     BP Readings from Last 1 Encounters:   10/18/19 (!) 160/105             Passed - Serum Creatinine in past 12 months      Creatinine   Date Value Ref Range Status   10/18/2019 1.10 0.70 - 1.30 mg/dL Final

## 2021-06-04 NOTE — TELEPHONE ENCOUNTER
RN cannot approve Refill Request    RN can NOT refill this medication med is not covered by policy/route to provider. Last office visit: 10/18/2019 Alejandro Villalpando MD Last Physical: Visit date not found Last MTM visit: Visit date not found Last visit same specialty: 10/18/2019 Alejandro Villalpando MD.  Next visit within 3 mo: Visit date not found  Next physical within 3 mo: Visit date not found      Ricarda Beatty, [facility] Triage/Med Refill 12/19/2019    Requested Prescriptions   Pending Prescriptions Disp Refills     ibuprofen (ADVIL,MOTRIN) 600 MG tablet [Pharmacy Med Name: IBUPROFEN 600MG TABLETS] 90 tablet 3     Sig: TAKE 1 TABLET(600 MG) BY MOUTH THREE TIMES DAILY       There is no refill protocol information for this order

## 2021-06-04 NOTE — TELEPHONE ENCOUNTER
Medication: Adderall  Last Date Filled 11/18/19   pulled: YES    Only PCP Prescribing? : YES  Taken as prescribed from physician notes? YES    CSA in last year: YES  Random Utox in last year: YES  (if any of the above answer NO - schedule with PCP)     Opioids + benzodiazepines? YES  (if the above answer YES - schedule with PCP every 6 months)     Is patient on the Executive Review Team? no      All responses suggest: Refilling prescription

## 2021-06-04 NOTE — TELEPHONE ENCOUNTER
Controlled Substance Refill Request  Medication Name:   Requested Prescriptions     Pending Prescriptions Disp Refills     dextroamphetamine-amphetamine (ADDERALL XR) 20 MG 24 hr capsule 60 capsule 0     Sig: Take 2 capsules (40 mg total) by mouth every morning.     Date Last Fill: 11/18/19  [facility] [place] [facility]      Submit electronically to pharmacy  Controlled Substance Agreement Date Scanned:   Encounter-Level CSA Scan Date - 10/11/2018:    Scan on 10/19/2018  1:50 PM           Encounter-Level CSA Scan Date - 04/10/2018:    Scan on 4/17/2018 11:37 AM           Encounter-Level CSA Scan Date - 09/28/2017:    Scan on 10/2/2017  1:45 PM           Encounter-Level CSA Scan Date - 07/25/2016:    Scan on 7/27/2016 10:41 AM: CONTROLLED SUBSTANCE 7/25/2016       Last office visit with prescriber/PCP: 10/18/2019 Alejandro Villalpando MD OR same dept: 10/18/2019 Alejandro Villalpando MD OR same specialty: 10/18/2019 Alejandro Villalpando MD  Last physical: Visit date not found Last MTM visit: Visit date not found

## 2021-06-05 NOTE — PATIENT INSTRUCTIONS - HE
1.  Use a fiber supplement daily.    2.  Prevnar vaccine age 65.    3.  Cologuard when no rectal bleeding.    4.  Work on smoking cessation    5.  Continue current medications    6.  See in 3 months or as needed    7.  Check basic metabolic panel and hemoglobin A1c today since blood sugar was high at last visit.

## 2021-06-05 NOTE — PROGRESS NOTES
ASSESSMENT:  1. Essential hypertension  Metoprolol was added to losartan-HCTZ and amlodipine at last visit.  Blood pressure is acceptable on this regimen.  He has a prescription for ibuprofen but uses it only on rare occasions.  He was counseled that this could contribute to elevated BP    2. Bipolar Disorder  He is on Seroquel and feels he is doing well.    3. Attention deficit hyperactivity disorder (ADHD), predominantly hyperactive type  Doing well on Adderall.  A new controlled substance agreement was discussed and signed.  He has tested positive for marijuana in the past.  Reports that he has used this occasionally, but not on a regular basis.    4. Tobacco use disorder, continuous  He has started on Nicorette gum.  He has not yet started the nicotine patch and was encouraged to begin this    5. Chronic hepatitis C without hepatic coma (H)  Underwent treatment including interferon in 2007.  Review indicates that hepatitis C RNA was negative in 2008    6. Hyperglycemia  Blood sugar was elevated to 128 at last visit.  Hemoglobin A1c today is high end of normal at 6.0  - Glycosylated Hemoglobin A1c  - Basic Metabolic Panel      8. Tachycardia  He was started on a beta-blocker at last visit since he had sinus tachycardia in addition to elevated blood pressure.  Pulse is now reasonable.  He denies adverse effects from the metoprolol  - metoprolol succinate (TOPROL XL) 50 MG 24 hr tablet; Take 1 tablet (50 mg total) by mouth daily.  Dispense: 90 tablet; Refill: 3    9. Joint pain  He has used ibuprofen for joint pain and headaches in the past.  He was counseled that this could contribute to elevated blood pressure.  It should only be used on rare occasions  - ibuprofen (ADVIL,MOTRIN) 600 MG tablet; Take 1 tablet (600 mg total) by mouth every 8 (eight) hours as needed for pain.  Dispense: 90 tablet; Refill: 3    10.  Rectal bleeding  He notes occasional bright red rectal bleeding separate from stool after straining.   He has known hemorrhoids.  A fiber supplement was encouraged.  He also could use an anal lubricant if needed.  He has not yet sent in Cologuard for colon cancer screening.  This should be sent in after there is been no active bleeding.    PLAN:  Patient Instructions   1.  Use a fiber supplement daily.    2.  Prevnar vaccine age 65.    3.  Cologuard when no rectal bleeding.    4.  Work on smoking cessation    5.  Continue current medications    6.  See in 3 months or as needed    7.  Check basic metabolic panel and glycosylated hemoglobin today  Orders Placed This Encounter   Procedures     Glycosylated Hemoglobin A1c     Basic Metabolic Panel     Medications Discontinued During This Encounter   Medication Reason     metoprolol succinate (TOPROL XL) 50 MG 24 hr tablet Reorder     ibuprofen (ADVIL,MOTRIN) 600 MG tablet Reorder       Return in about 3 months (around 4/30/2020) for Recheck.      CHIEF COMPLAINT:  Chief Complaint   Patient presents with     Follow-up     new CSA for Adderall       HISTORY OF PRESENT ILLNESS:  Socrates is a 64 y.o. male presenting to the clinic today for a follow up about his hypertension and ADHD.     Bowel problems:   The patient says he has been seeing blood in his stool recently. The blood is on the outside of the stool and does not seem to be mixed in. He thinks he has hemorrhoids. He has be eating charcoal to help with this.     Hypertension:  His blood pressure is 132/84 in the clinic today. He has been taking 50 mg metoprolol daily. He does not report feeling any side effects from this.     Health Maintenance: He was given the pneumonia shot today.     REVIEW OF SYSTEMS:   He uses ibuprofen as needed.   All other systems are negative.     PFSH:  He does not ride his bike in the winter.   He has been smoking less recently.   He has been treated for hepatitis C in the past.     TOBACCO USE:  Social History     Tobacco Use   Smoking Status Current Every Day Smoker     Packs/day: 0.25      "Types: Cigarettes   Smokeless Tobacco Never Used       VITALS:  Vitals:    01/31/20 1018   BP: 132/84   Patient Site: Left Arm   Patient Position: Sitting   Cuff Size: Adult Regular   Pulse: 98   SpO2: 98%   Weight: 185 lb (83.9 kg)   Height: 5' 9\" (1.753 m)     Wt Readings from Last 3 Encounters:   01/31/20 185 lb (83.9 kg)   10/18/19 185 lb (83.9 kg)   04/16/19 181 lb (82.1 kg)     Body mass index is 27.32 kg/m .    PHYSICAL EXAM:  Constitutional:  Reveals an alert, talkative man. Does not appear acutely ill.  Vitals:  Per nursing notes. MD BP: 138/80.  Head: Atraumatic.   Ears:  Clear.  Oropharynx:  Without posterior nasal drainage or thrush.  Neck:  Supple, thyroid not palpable.  Cardiac:  Regular rate and rhythm without murmurs, rubs, or gallops.  Legs without edema.  Lungs: Clear.  Respiratory effort normal.  Abdomen:   Bowel sounds positive, nontender, nondistended. No masses.   Skin:   Without rash, bruise, or palpable lesions.  Extremities: No edema  Neurologic:  Alert talkative no gross focal deficits. Detailed exam not done  Psychiatric:  Mood appropriate, memory intact.     MEDICATIONS:  Current Outpatient Medications   Medication Sig Dispense Refill     amLODIPine (NORVASC) 5 MG tablet TAKE 1 TABLET(5 MG) BY MOUTH DAILY 90 tablet 2     dextroamphetamine-amphetamine (ADDERALL XR) 20 MG 24 hr capsule Take 2 capsules (40 mg total) by mouth every morning. 60 capsule 0     ibuprofen (ADVIL,MOTRIN) 600 MG tablet Take 1 tablet (600 mg total) by mouth every 8 (eight) hours as needed for pain. 90 tablet 3     losartan-hydrochlorothiazide (HYZAAR) 100-25 mg per tablet Take 1 tablet by mouth daily. 90 tablet 2     metoprolol succinate (TOPROL XL) 50 MG 24 hr tablet Take 1 tablet (50 mg total) by mouth daily. 90 tablet 3     nicotine (NICODERM CQ) 21 mg/24 hr Place 1 patch on the skin daily. 30 patch 1     QUEtiapine (SEROQUEL) 50 MG tablet TAKE 1 TO 2 TABLETS BY MOUTH AT BEDTIME AS NEEDED 60 tablet 7     No " current facility-administered medications for this visit.        ADDITIONAL HISTORY SUMMARIZED (2): None.  DECISION TO OBTAIN EXTRA INFORMATION (1): None.   RADIOLOGY TESTS (1): None.  LABS (1): Labs ordered.  MEDICINE TESTS (1): None.  INDEPENDENT REVIEW (2 each): None.     The visit lasted a total of 16 minutes face to face with the patient. Over 50% of the time was spent counseling and educating the patient about bowel problems, hypertension.    IEze, am scribing for and in the presence of, Dr. Villalpando.    I, Dr. Villalpando, personally performed the services described in this documentation, as scribed by Eze Juarez in my presence, and it is both accurate and complete.    Total data points:2

## 2021-06-05 NOTE — TELEPHONE ENCOUNTER
Patient Returning Call  Reason for call:  Returning call  Information relayed to patient:  Confirmed message if patient can come in at 10 am on 01-31-20.  Yes, patient can come in at 10 am on 01-31. Thanks.  Patient has additional questions:  No  If YES, what are your questions/concerns:  Please update patient appointment to reflect 10 am time on 01-31. Thanks.  Okay to leave a detailed message?: No call back needed

## 2021-06-05 NOTE — TELEPHONE ENCOUNTER
Controlled Substance Refill Request  Medication Name:   Requested Prescriptions     Pending Prescriptions Disp Refills     dextroamphetamine-amphetamine (ADDERALL XR) 20 MG 24 hr capsule 60 capsule 0     Sig: Take 2 capsules (40 mg total) by mouth every morning.     Date Last Fill: 12/17/19  Requested [facility]acy: [id number]  Submit electronically to pharmacy  Controlled Substance Agreement on file:   Encounter-Level CSA Scan Date - 10/11/2018:    Scan on 10/19/2018  1:50 PM           Encounter-Level CSA Scan Date - 04/10/2018:    Scan on 4/17/2018 11:37 AM           Encounter-Level CSA Scan Date - 09/28/2017:    Scan on 10/2/2017  1:45 PM           Encounter-Level CSA Scan Date - 07/25/2016:    Scan on 7/27/2016 10:41 AM: CONTROLLED SUBSTANCE 7/25/2016        Last office visit:  10/18/19

## 2021-06-06 NOTE — TELEPHONE ENCOUNTER
Controlled Substance Refill Request  Medication Name:   Requested Prescriptions     Pending Prescriptions Disp Refills     dextroamphetamine-amphetamine (ADDERALL XR) 20 MG 24 hr capsule 60 capsule 0     Sig: Take 2 capsules (40 mg total) by mouth every morning.     Date Last Fill: 2/14/20  Is patient out of medication?:  Yes  Patient notified refills processed within 3 business days:  Yes  Requested [facility]acy: [facility]  Submit electronically to pharmacy  Controlled Substance Agreement on file:   Encounter-Level CSA Scan Date - 10/11/2018:    Scan on 10/19/2018  1:50 PM           Encounter-Level CSA Scan Date - 04/10/2018:    Scan on 4/17/2018 11:37 AM           Encounter-Level CSA Scan Date - 09/28/2017:    Scan on 10/2/2017  1:45 PM           Encounter-Level CSA Scan Date - 07/25/2016:    Scan on 7/27/2016 10:41 AM: CONTROLLED SUBSTANCE 7/25/2016        Last office visit:  2/14/20    Patient thought his appointment was today but it turns out to be 4/17/20.  He was going to have Dr. Kwasi nieves at the appointment.

## 2021-06-06 NOTE — TELEPHONE ENCOUNTER
Central PA team  [phone]  Pool: HE PA MED ([id number])          PA has been initiated.       PA form completed and faxed insurance via Cover My Meds     Key:  [id number]     Medication:  Amphetamine-Dextroamphet ER 20MG er capsules      Insurance:  [employer]         Response will be received via fax and may take up to 5-10 business days depending on plan

## 2021-06-06 NOTE — TELEPHONE ENCOUNTER
Who is calling:  Patient   Reason for Call:  Caller stated that Alejandro Villalpando MD needed to fill out some kind of form and fax it to his insurances for his mediation. ( writer asked if it was a prior auth but patient stated that it was done already and that it was a form that Alejandro Villalpando MD had to fill out.) caller stated that his insurance did give a fax number to Alejandro Villalpando MD to fax those forms to them. Fax number [phone]. (writer did ask patient what kind of forms caller stated that Alejandro Villalpando MD already knows). Did inform him that medication was sent over today to the pharmacy.   Date of last appointment with primary care:   Okay to leave a detailed message: Yes

## 2021-06-06 NOTE — TELEPHONE ENCOUNTER
Duplicate encounter. This was already approved as of today.  Pharmacy was called and they will let patient know when its ready for .

## 2021-06-06 NOTE — TELEPHONE ENCOUNTER
Spoke with pharmacist who states a prior auth is required.  Reports insurance only covers 1 capsule per day.

## 2021-06-06 NOTE — TELEPHONE ENCOUNTER
Medication: Adderall  Last Date Filled 2/14/20   pulled: YES    Only PCP Prescribing? : YES  Taken as prescribed from physician notes? YES    CSA in last year: YES  Random Utox in last year: YES  (if any of the above answer NO - schedule with PCP)     Opioids + benzodiazepines? YES  (if the above answer YES - schedule with PCP every 6 months)     Is patient on the Executive Review Team? no      All responses suggest: Refilling prescription

## 2021-06-06 NOTE — TELEPHONE ENCOUNTER
Prior Authorization Request  Who s requesting:  Patient  Pharmacy Name and Location: [facility] #[id number]  Medication Name:    Disp  Refills  Start  End     dextroamphetamine-amphetamine (ADDERALL XR) 20 MG 24 hr capsule  60 capsule  0  3/17/2020      Sig - Route: Take 2 capsules (40 mg total) by mouth every morning. - Oral     Sent to pharmacy as: dextroamphetamine-amphetamine ER 20 mg 24hr capsule,extend release (Adderall XR)     Earliest Fill Date: 3/17/2020     E-Prescribing Status: Receipt confirmed by pharmacy (3/17/2020  2:18 PM CDT)       Insurance Plan: [employer]  Insurance Member ID Number:  [id number]   CoverMyMeds Key: N/A  Informed patient that prior authorizations can take up to 10 business days for response:   Yes  Okay to leave a detailed message: Yes  [phone]    FYI: Patient stated that he is out of his medication and would like this to be expedite. Patient stated that he apologized that he did not inform the clinic ahead of time.

## 2021-06-06 NOTE — TELEPHONE ENCOUNTER
Controlled Substance Refill Request  Medication Name:   Requested Prescriptions     Pending Prescriptions Disp Refills     dextroamphetamine-amphetamine (ADDERALL XR) 20 MG 24 hr capsule 60 capsule 0     Sig: Take 2 capsules (40 mg total) by mouth every morning.     Date Last Fill: 1/17/20  Is patient out of medication?: No, 3 days left  Patient notified refills processed within 3 business days:  Yes  Requested Pharmacy: [id number]  Submit electronically to pharmacy  Controlled Substance Agreement on file:   Encounter-Level CSA Scan Date - 10/11/2018:    Scan on 10/19/2018  1:50 PM           Encounter-Level CSA Scan Date - 04/10/2018:    Scan on 4/17/2018 11:37 AM           Encounter-Level CSA Scan Date - 09/28/2017:    Scan on 10/2/2017  1:45 PM           Encounter-Level CSA Scan Date - 07/25/2016:    Scan on 7/27/2016 10:41 AM: CONTROLLED SUBSTANCE 7/25/2016        Last office visit:  1/31/20

## 2021-06-07 NOTE — TELEPHONE ENCOUNTER
Controlled Substance Refill Request  Medication Name:   Requested Prescriptions     Pending Prescriptions Disp Refills     dextroamphetamine-amphetamine (ADDERALL XR) 20 MG 24 hr capsule 60 capsule 0     Sig: Take 2 capsules (40 mg total) by mouth every morning.     Date Last Fill: 3/17/20  Is patient out of medication?: No, 2 days left  Patient notified refills processed within 3 business days:  Yes  Requested Pharmacy: [facility]  Submit electronically to pharmacy  Controlled Substance Agreement on file:   Encounter-Level CSA Scan Date - 10/11/2018:    Scan on 10/19/2018  1:50 PM           Encounter-Level CSA Scan Date - 04/10/2018:    Scan on 4/17/2018 11:37 AM           Encounter-Level CSA Scan Date - 09/28/2017:    Scan on 10/2/2017  1:45 PM           Encounter-Level CSA Scan Date - 07/25/2016:    Scan on 7/27/2016 10:41 AM: CONTROLLED SUBSTANCE 7/25/2016        Last office visit:  1/31/20

## 2021-06-07 NOTE — PATIENT INSTRUCTIONS - HE
1.  Continue current medications.  Refill of Adderall seems appropriate.    2.  Work on smoking cessation    3.  In 3 months for follow-up of hypertension and reassessment of other concerns    4.  Flu shot would be encouraged in the fall

## 2021-06-07 NOTE — PROGRESS NOTES
ASSESSMENT:  1.  Essential hypertension  Socrates is treated with metoprolol succinate, losartan-HCTZ 100/25 mg daily and amlodipine 5 mg daily.  He checked his blood pressure recently at the [facility] and reports it was excellent he has not had further episodes with tachycardia.  He denies adverse effects on current medications.    2.  Bipolar affective disorder:  He takes Seroquel 50 mg at bedtime, he sometimes will take a second 1 if he has difficulty sleeping.  Overall he feels he is doing well.    3.  Attention deficit disorder:  Uses Adderall XR 40 mg in the a.m.  He states he feels well on this regimen    4.  Tobacco use:  He is still smoking, but using Nicorette gum and trying to quit    5.  Hypercholesterolemia;  He is working on dietary management    Preventive Health Care: He declined a flu shot last fall.  This would be strongly encouraged next year.  Pneumonia vaccine at age 65 is advised    PLAN:  1.  Continue medications at current doses.  Continuation of Adderall is appropriate.  He seems to be doing well with Seroquel    2.  See in 3 months for blood pressure recheck.  Monitoring labs were last checked in October      CHIEF COMPLAINT:  Chief Complaint   Patient presents with     Follow-up     HTN and medication, everything is good. he went to the [facility] and had good readings       HISTORY OF PRESENT ILLNESS:  Socrates is a 64 y.o. male calling in to the clinic today for follow-up of attention deficit disorder, bipolar disorder and hypertension.  Metoprolol was added to losartan-HCTZ and amlodipine at a previous visit due to tachycardia with elevated blood pressure.  He reports recent readings at the [facility] have been in the normal range.  He has not experienced any tachycardia.  He denies adverse effects from current medications    He continues Seroquel 50 mg at bedtime for bipolar disorder.  He sometimes takes a second pill.  Overall, he feels he is doing well.    He remains on Adderall XR 40  mg in the morning for attention deficit disorder.  He feels he does well with this    He continues to smoke, but is trying to cut back on this he is using Nicorette gum    He reports occasional sneezing spells while riding his bike.  He is not aware of any history of seasonal allergies.  He does not feel this is severe enough to warrant taking a medication.    REVIEW OF SYSTEMS:    All other systems are negative.    PFSH:  .  No further alcohol use.  Does smoke.  Has lived in public housing since 2013.  Formerly at the [employer]    TOBACCO USE:  Social History     Tobacco Use   Smoking Status Current Every Day Smoker     Packs/day: 0.25     Types: Cigarettes   Smokeless Tobacco Never Used       VITALS:  Stated Blood Pressure 120s over 80s  Stated Pulse 70s to 80s  Stated Weight stable, but notices that Seroquel boosts his appetite    PHYSICAL EXAM:  (observations via phone)  Alert pleasant talkative male with appropriate affect.  He does not sound acutely ill.          The visit lasted a total of 13 minutes   CA intake time  5 minutes    Telephone Consent was completed by  staff and confirmed by Shaneka RUVALCABA      I have reviewed and updated the patient's Past Medical History, Social History, Family History as appropriate.    MEDICATIONS: Reviewed and updated per CA and MD  Current Outpatient Medications   Medication Sig Dispense Refill     amLODIPine (NORVASC) 5 MG tablet TAKE 1 TABLET(5 MG) BY MOUTH DAILY 90 tablet 3     dextroamphetamine-amphetamine (ADDERALL XR) 20 MG 24 hr capsule Take 2 capsules (40 mg total) by mouth every morning. 60 capsule 0     ibuprofen (ADVIL,MOTRIN) 600 MG tablet Take 1 tablet (600 mg total) by mouth every 8 (eight) hours as needed for pain. 90 tablet 3     losartan-hydrochlorothiazide (HYZAAR) 100-25 mg per tablet Take 1 tablet by mouth daily. 90 tablet 2     metoprolol succinate (TOPROL XL) 50 MG 24 hr tablet Take 1 tablet (50 mg total) by mouth  "daily. 90 tablet 3     nicotine (NICODERM CQ) 21 mg/24 hr Place 1 patch on the skin daily. 30 patch 1     QUEtiapine (SEROQUEL) 50 MG tablet TAKE 1 TO 2 TABLETS BY MOUTH AT BEDTIME AS NEEDED 60 tablet 7     No current facility-administered medications for this visit.            The patient has been notified of following:     \"This telephone visit will be conducted via a call between you and your physician/provider. We have found that certain health care needs can be provided without the need for a physical exam.  This service lets us provide the care you need with a short phone conversation.  If a prescription is necessary we can send it directly to your pharmacy.  If lab work is needed we can place an order for that and you can then stop by our lab to have the test done at a later time.    If during the course of the call the physician/provider feels a telephone visit is not appropriate, you will not be charged for this service.\"     "

## 2021-06-08 NOTE — TELEPHONE ENCOUNTER
Controlled Substance Refill Request  Medication Name:   Requested Prescriptions     Pending Prescriptions Disp Refills     dextroamphetamine-amphetamine (ADDERALL XR) 20 MG 24 hr capsule 60 capsule 0     Sig: Take 2 capsules (40 mg total) by mouth every morning.     Date Last Fill: 4/15/20  Is patient out of medication?: No, 2 days left  Patient notified refills processed within 3 business days:  Yes  Requested Pharmacy: [facility]  Submit electronically to pharmacy  Controlled Substance Agreement on file:   Encounter-Level CSA Scan Date - 10/11/2018:    Scan on 10/19/2018  1:50 PM           Encounter-Level CSA Scan Date - 04/10/2018:    Scan on 4/17/2018 11:37 AM           Encounter-Level CSA Scan Date - 09/28/2017:    Scan on 10/2/2017  1:45 PM           Encounter-Level CSA Scan Date - 07/25/2016:    Scan on 7/27/2016 10:41 AM: CONTROLLED SUBSTANCE 7/25/2016        Last office visit:  4/21/20

## 2021-06-08 NOTE — TELEPHONE ENCOUNTER
Controlled Substance Refill Request  Medication Name:   Requested Prescriptions     Pending Prescriptions Disp Refills     dextroamphetamine-amphetamine (ADDERALL XR) 20 MG 24 hr capsule 60 capsule 0     Sig: Take 2 capsules (40 mg total) by mouth every morning.     Date Last Fill: 5/14/20  Is patient out of medication?: No, 2 days left  Patient notified refills processed within 3 business days:  Yes  Requested Pharmacy: [facility]  Submit electronically to pharmacy  Controlled Substance Agreement on file:   Encounter-Level CSA Scan Date - 10/11/2018:    Scan on 10/19/2018  1:50 PM           Encounter-Level CSA Scan Date - 04/10/2018:    Scan on 4/17/2018 11:37 AM           Encounter-Level CSA Scan Date - 09/28/2017:    Scan on 10/2/2017  1:45 PM           Encounter-Level CSA Scan Date - 07/25/2016:    Scan on 7/27/2016 10:41 AM: CONTROLLED SUBSTANCE 7/25/2016        Last office visit:  4/21/20

## 2021-06-08 NOTE — TELEPHONE ENCOUNTER
RN cannot approve Refill Request    RN can NOT refill this medication med is not covered by policy/route to provider. Last office visit: 1/31/2020 Alejandro Villalpando MD Last Physical: Visit date not found Last MTM visit: Visit date not found Last visit same specialty: 1/31/2020 Alejandro Villalpando MD.  Next visit within 3 mo: Visit date not found  Next physical within 3 mo: Visit date not found      Frieda Gomez, [facility] Triage/Med Refill 5/30/2020    Requested Prescriptions   Pending Prescriptions Disp Refills     QUEtiapine (SEROQUEL) 50 MG tablet [Pharmacy Med Name: QUETIAPINE 50MG TABLETS] 60 tablet 7     Sig: TAKE 1 TO 2 TABLETS BY MOUTH AT BEDTIME AS NEEDED       There is no refill protocol information for this order

## 2021-06-09 NOTE — TELEPHONE ENCOUNTER
Controlled Substance Refill Request  Medication Name:   Requested Prescriptions     Pending Prescriptions Disp Refills     dextroamphetamine-amphetamine (ADDERALL XR) 20 MG 24 hr capsule 60 capsule 0     Sig: Take 2 capsules (40 mg total) by mouth every morning.     Date Last Fill: 06/12/20  Requested Pharmacy: [facility]  Submit electronically to pharmacy  Controlled Substance Agreement on file:   Encounter-Level CSA Scan Date - 10/11/2018:    Scan on 10/19/2018  1:50 PM           Encounter-Level CSA Scan Date - 04/10/2018:    Scan on 4/17/2018 11:37 AM           Encounter-Level CSA Scan Date - 09/28/2017:    Scan on 10/2/2017  1:45 PM           Encounter-Level CSA Scan Date - 07/25/2016:    Scan on 7/27/2016 10:41 AM: CONTROLLED SUBSTANCE 7/25/2016        Last office visit:  04/21/20

## 2021-06-10 NOTE — TELEPHONE ENCOUNTER
Controlled Substance Refill Request  Medication Name:   Requested Prescriptions     Pending Prescriptions Disp Refills     dextroamphetamine-amphetamine (ADDERALL XR) 20 MG 24 hr capsule 60 capsule 0     Sig: Take 2 capsules (40 mg total) by mouth every morning.     Date Last Fill: 7/16/20  Is patient out of medication?: No, 2 days left  Patient notified refills processed within 3 business days:  Yes. The patient states he would like the refill before the weekend if possible.  Requested Pharmacy: [facility]  Submit electronically to pharmacy  Controlled Substance Agreement on file:   Encounter-Level CSA Scan Date - 10/11/2018:    Scan on 10/19/2018  1:50 PM           Encounter-Level CSA Scan Date - 04/10/2018:    Scan on 4/17/2018 11:37 AM           Encounter-Level CSA Scan Date - 09/28/2017:    Scan on 10/2/2017  1:45 PM           Encounter-Level CSA Scan Date - 07/25/2016:    Scan on 7/27/2016 10:41 AM: CONTROLLED SUBSTANCE 7/25/2016        Last office visit:  4/21/20

## 2021-06-11 NOTE — TELEPHONE ENCOUNTER
Controlled Substance Refill Request  Medication Name:   Requested Prescriptions     Pending Prescriptions Disp Refills     dextroamphetamine-amphetamine (ADDERALL XR) 20 MG 24 hr capsule 60 capsule 0     Sig: Take 2 capsules (40 mg total) by mouth every morning.     Date Last Fill: 08/14/20  Requested Pharmacy: [facility]  Submit electronically to pharmacy  Controlled Substance Agreement on file:   Encounter-Level CSA Scan Date - 10/11/2018:    Scan on 10/19/2018  1:50 PM           Encounter-Level CSA Scan Date - 04/10/2018:    Scan on 4/17/2018 11:37 AM           Encounter-Level CSA Scan Date - 09/28/2017:    Scan on 10/2/2017  1:45 PM           Encounter-Level CSA Scan Date - 07/25/2016:    Scan on 7/27/2016 10:41 AM: CONTROLLED SUBSTANCE 7/25/2016        Last office visit:  04/21/20

## 2021-06-12 NOTE — TELEPHONE ENCOUNTER
Patient Returning Call  Reason for call:  Return call.  Information relayed to patient:  Patient was informed he is due for a medication check and that it can be done virtually.   Patient has additional questions:  No  If YES, what are your questions/concerns:  n/a  Okay to leave a detailed message?: No call back needed     Patient was transferred to scheduling to make an appointment.

## 2021-06-12 NOTE — PROGRESS NOTES
Patient would like to be contacted via the following phone number [phone]     ASSESSMENT:  1. Tobacco use disorder, continuous  Les is smoking 3 to 4 packs weekly.  He reports this is not allowed as a [place].  He is motivated to stop.  He did not try a nicotine patch since his brother had adverse effects from it.  He would like to try nicotine gum.    2.  Essential hypertension:  Blood pressure is 136/78 today.  He is on losartan-HCTZ 100/25 daily along with metoprolol XL 50 mg daily.  He will continue the current regimen.    3.  Attention deficit disorder  He is on Adderall XR 40 mg in the morning.  He feels this has been of benefit and would like to continue it    4.  Bipolar disorder  He has a prescription for Seroquel, but he is using this intermittently rather than every night.  He feels that overall he is doing well    Preventive Health Care: He declines an influenza vaccine.  He had Pneumovax 23 in 2016.  Shingrix is advised.    PLAN:  1.  Try Nicorette gum for smoking cessation.    2.  Flu shot was advised but declined.    3.  Continue Adderall XR at current dose    4.  Continue other medications at current doses    5.  See in 3 months or as needed    6.  Use able OTC eye lubricating drops such as artificial tears for dry eyes      CHIEF COMPLAINT:  Chief Complaint   Patient presents with     Medication Check     Dry Eye     wants eye drops for dry eyes     Nicotine Dependence     wants nicorette gum     Allergies     seasonal/hay fever       HISTORY OF PRESENT ILLNESS:  Socrates is a 65 y.o. male contacting the clinic today via phone for follow-up of multiple concerns.  He has a history of attention deficit disorder on Adderall XR 40 mg daily.  He feels he does well on this dose and would like to continue it.  He is on Seroquel 50 mg 1-2 at bedtime for bipolar disorder.  He acknowledges that he uses this intermittently rather than every night.  He feels it is helpful when used.    Blood pressure  has been under good control on a combination of losartan-HCTZ and metoprolol XL 50 mg daily.  Blood pressure was 136/78 this morning    He is interested in Nicorette gum for smoking cessation.    He does note dry eyes and wonders about management.    REVIEW OF SYSTEMS:     All other systems are negative.    PFSH:  Social History     Social History Narrative     Not on file     .  Recently went on Social Security.  Reports brother had throat cancer.  He is still having difficulty stopping smoking    TOBACCO USE:  Social History     Tobacco Use   Smoking Status Current Every Day Smoker     Packs/day: 0.25     Types: Cigarettes   Smokeless Tobacco Never Used       VITALS:  There were no vitals filed for this visit.  Wt Readings from Last 3 Encounters:   01/31/20 185 lb (83.9 kg)   10/18/19 185 lb (83.9 kg)   04/16/19 181 lb (82.1 kg)       PHYSICAL EXAM:  (observations via Phone)  Alert talkative man with appropriate affect.  He does not sound in acute distress.    Phone Start time:  10:55  Phone End time:  11: 18    The visit lasted a total of 23 minutes     CA Intake Time: 5 minutes    I have reviewed and updated the patient's Past Medical History, Social History, Family History as appropriate.    MEDICATIONS: Reviewed and updated per CA and MD  Current Outpatient Medications   Medication Sig Dispense Refill     amLODIPine (NORVASC) 5 MG tablet TAKE 1 TABLET(5 MG) BY MOUTH DAILY 90 tablet 3     dextroamphetamine-amphetamine (ADDERALL XR) 20 MG 24 hr capsule Take 2 capsules (40 mg total) by mouth every morning. 60 capsule 0     ibuprofen (ADVIL,MOTRIN) 600 MG tablet Take 1 tablet (600 mg total) by mouth every 8 (eight) hours as needed for pain. 90 tablet 3     losartan-hydrochlorothiazide (HYZAAR) 100-25 mg per tablet Take 1 tablet by mouth daily. 90 tablet 2     metoprolol succinate (TOPROL XL) 50 MG 24 hr tablet Take 1 tablet (50 mg total) by mouth daily. 90 tablet 3     QUEtiapine (SEROQUEL) 50 MG tablet  "TAKE 1 TO 2 TABLETS BY MOUTH AT BEDTIME AS NEEDED 60 tablet 11     nicotine (NICODERM CQ) 21 mg/24 hr Place 1 patch on the skin daily. 30 patch 1     No current facility-administered medications for this visit.        The patient has been notified of following:     \"This telephone visit will be conducted via a call between you and your physician/provider. We have found that certain health care needs can be provided without the need for a physical exam.  This service lets us provide the care you need with a short phone conversation.  If a prescription is necessary we can send it directly to your pharmacy.  If lab work is needed we can place an order for that and you can then stop by our lab to have the test done at a later time.    Telephone visits are billed at different rates depending on your insurance coverage. During this emergency period, for some insurers they may be billed the same as an in-person visit.  Please reach out to your insurance provider with any questions.    If during the course of the call the physician/provider feels a telephone visit is not appropriate, you will not be charged for this service.\"    Patient has given verbal consent to a Telephone visit? Yes    Patient would like to receive their AVS by AVS Preference: Carl.  "

## 2021-06-12 NOTE — PATIENT INSTRUCTIONS - HE
1.  Fill Nicorette gum for smoking cessation    2.  Flu shot was advised, but declined.    3.  Continue other medications at current doses.    4.  See in 3 months or as needed.

## 2021-06-12 NOTE — TELEPHONE ENCOUNTER
Controlled Substance Refill Request  Medication Name:   Requested Prescriptions     Pending Prescriptions Disp Refills     dextroamphetamine-amphetamine (ADDERALL XR) 20 MG 24 hr capsule 60 capsule 0     Sig: Take 2 capsules (40 mg total) by mouth every morning.     Date Last Fill: 9/15/2020  Requested Pharmacy: [facility]  Submit electronically to pharmacy  Controlled Substance Agreement on file:   Encounter-Level CSA Scan Date - 10/11/2018:    Scan on 10/19/2018  1:50 PM           Encounter-Level CSA Scan Date - 04/10/2018:    Scan on 4/17/2018 11:37 AM           Encounter-Level CSA Scan Date - 09/28/2017:    Scan on 10/2/2017  1:45 PM           Encounter-Level CSA Scan Date - 07/25/2016:    Scan on 7/27/2016 10:41 AM: CONTROLLED SUBSTANCE 7/25/2016        Last office visit:  4/21/2020

## 2021-06-13 NOTE — TELEPHONE ENCOUNTER
Controlled Substance Refill Request  Medication Name:   Requested Prescriptions     Pending Prescriptions Disp Refills     dextroamphetamine-amphetamine (ADDERALL XR) 20 MG 24 hr capsule 60 capsule 0     Sig: Take 2 capsules (40 mg total) by mouth every morning.     Date Last Fill: 11/13/2020  Requested Pharmacy: [facility]  Submit electronically to pharmacy  Controlled Substance Agreement on file:   Encounter-Level CSA Scan Date - 10/11/2018:    Scan on 10/19/2018  1:50 PM           Encounter-Level CSA Scan Date - 04/10/2018:    Scan on 4/17/2018 11:37 AM           Encounter-Level CSA Scan Date - 09/28/2017:    Scan on 10/2/2017  1:45 PM           Encounter-Level CSA Scan Date - 07/25/2016:    Scan on 7/27/2016 10:41 AM: CONTROLLED SUBSTANCE 7/25/2016        Last office visit:  11/3/2020    I am totally out of this medication and need this refilled ASAP.

## 2021-06-13 NOTE — TELEPHONE ENCOUNTER
Controlled Substance Refill Request  Medication Name:   Requested Prescriptions     Pending Prescriptions Disp Refills     dextroamphetamine-amphetamine (ADDERALL XR) 20 MG 24 hr capsule 60 capsule 0     Sig: Take 2 capsules (40 mg total) by mouth every morning.     Date Last Fill: 10/15/20  Is patient out of medication?: No, 2 days left  Patient notified refills processed within 3 business days:  Yes  Requested Pharmacy: [facility]  Submit electronically to pharmacy  Controlled Substance Agreement on file:   Encounter-Level CSA Scan Date - 10/11/2018:    Scan on 10/19/2018  1:50 PM           Encounter-Level CSA Scan Date - 04/10/2018:    Scan on 4/17/2018 11:37 AM           Encounter-Level CSA Scan Date - 09/28/2017:    Scan on 10/2/2017  1:45 PM           Encounter-Level CSA Scan Date - 07/25/2016:    Scan on 7/27/2016 10:41 AM: CONTROLLED SUBSTANCE 7/25/2016        Last office visit:  11/3/20

## 2021-06-13 NOTE — TELEPHONE ENCOUNTER
Refill Approved    Rx renewed per Medication Renewal Policy. Medication was last renewed on 12/9/9.    Cherry Baig, [facility] Triage/Med Refill 11/12/2020     Requested Prescriptions   Pending Prescriptions Disp Refills     losartan-hydrochlorothiazide (HYZAAR) 100-25 mg per tablet [Pharmacy Med Name: LOSARTAN/HCTZ 100/25MG TABLETS] 90 tablet 2     Sig: TAKE 1 TABLET BY MOUTH DAILY       Diuretics/Combination Diuretics Refill Protocol  Passed - 11/9/2020  3:15 AM        Passed - Visit with PCP or prescribing provider visit in past 12 months     Last office visit with prescriber/PCP: 1/31/2020 Alejandro Villalpando MD OR same dept: 1/31/2020 Alejandro Villalpando MD OR same specialty: 1/31/2020 Alejandro Villalpando MD  Last physical: Visit date not found Last MTM visit: Visit date not found   Next visit within 3 mo: Visit date not found  Next physical within 3 mo: Visit date not found  Prescriber OR PCP: Alejandro Villalpando MD  Last diagnosis associated with med order: 1. Hypertension  - losartan-hydrochlorothiazide (HYZAAR) 100-25 mg per tablet [Pharmacy Med Name: LOSARTAN/HCTZ 100/25MG TABLETS]; Take 1 tablet by mouth daily.  Dispense: 90 tablet; Refill: 2    If protocol passes may refill for 12 months if within 3 months of last provider visit (or a total of 15 months).             Passed - Serum Potassium in past 12 months      Lab Results   Component Value Date    Potassium 4.4 01/31/2020             Passed - Serum Sodium in past 12 months      Lab Results   Component Value Date    Sodium 145 01/31/2020             Passed - Blood pressure on file in past 12 months     BP Readings from Last 1 Encounters:   01/31/20 132/84             Passed - Serum Creatinine in past 12 months      Creatinine   Date Value Ref Range Status   01/31/2020 1.24 0.70 - 1.30 mg/dL Final

## 2021-06-13 NOTE — TELEPHONE ENCOUNTER
Prescription Monitoring Program activity reviewed with no discrepancies noted.      Last fill per : 11/13/20  Quantity/days supply: 60 tablets for 30 days    Controlled Substance Agreement on file: Yes  Date: 1/31/20    Last office visit with provider:  1/31/2020 Alejandro Villalpando MD    Please advise.

## 2021-06-14 NOTE — TELEPHONE ENCOUNTER
Reason for Call: medication refill    Do you use a [facility]?  No  Name of the pharmacy and phone number for the current request: [facility] on [address], Phone: [phone]    Name of the medication requested: dextroamphetamine-amphetamine (ADDERALL XR) 20 MG 24 hr capsule    Other request: Pt has a few days left then will be out. Pt mentioned that he will need a PA for this med and it's usually at the beginning of the year. He got a ltr from his ins company that this is required once a year.    Can we leave a detailed message on this number? Yes    Phone number patient can be reached at: Cell number on file:    Telephone Information:   Mobile [phone]       Best Time: anytime    Call taken on 1/13/2021 at 9:19 AM by Meliza Em

## 2021-06-15 NOTE — TELEPHONE ENCOUNTER
Pt Refuses Covid vaccine. /   Reason for call;  Pt called.  Request  Adderall  ASAP refill on Monday 3/15/21 to his [facility] pharmacy .   Recommendation / teaching ; FNA reviewed Rx Request policy of calling at least  72 hours- during business days at clinic for refills.       Caller Verbalizes understanding and denies further questions and will call back if  symptoms to triage or questions  .  Joanne Sarmiento RN  - [place] Nurse Advisor

## 2021-06-15 NOTE — TELEPHONE ENCOUNTER
RN cannot approve Refill Request    RN can NOT refill this medication PCP messaged that patient is overdue for BP and med is not covered by policy/route to provider. Last office visit: 1/31/2020 Alejandro Villalpando MD Last Physical: Visit date not found Last MTM visit: Visit date not found Last visit same specialty: 1/31/2020 Alejandro Villalpando MD.  Next visit within 3 mo: Visit date not found  Next physical within 3 mo: Visit date not found      Alyssa Osman, [facility] Triage/Med Refill 3/10/2021    Requested Prescriptions   Pending Prescriptions Disp Refills     metoprolol succinate (TOPROL-XL) 50 MG 24 hr tablet [Pharmacy Med Name: METOPROLOL ER SUCCINATE 50MG TABS] 90 tablet 3     Sig: TAKE 1 TABLET BY MOUTH EVERY DAY       Beta-Blockers Refill Protocol Failed - 3/10/2021  5:26 AM        Failed - Blood pressure filed in past 12 months     BP Readings from Last 1 Encounters:   01/31/20 132/84             Passed - PCP or prescribing provider visit in past 12 months or next 3 months     Last office visit with prescriber/PCP: 1/31/2020 Alejandro Villalpando MD OR same dept: Visit date not found OR same specialty: 1/31/2020 Alejandro Villalpando MD  Last physical: Visit date not found Last MTM visit: Visit date not found   Next visit within 3 mo: Visit date not found  Next physical within 3 mo: Visit date not found  Prescriber OR PCP: Alejandro Villalpando MD  Last diagnosis associated with med order: 1. Essential hypertension, benign  - metoprolol succinate (TOPROL-XL) 50 MG 24 hr tablet [Pharmacy Med Name: METOPROLOL ER SUCCINATE 50MG TABS]; TAKE 1 TABLET BY MOUTH EVERY DAY  Dispense: 90 tablet; Refill: 3    2. Tachycardia  - metoprolol succinate (TOPROL-XL) 50 MG 24 hr tablet [Pharmacy Med Name: METOPROLOL ER SUCCINATE 50MG TABS]; TAKE 1 TABLET BY MOUTH EVERY DAY  Dispense: 90 tablet; Refill: 3    3. Bipolar disorder, in partial remission, most recent episode depressed (H)  - QUEtiapine (SEROQUEL) 50 MG tablet [Pharmacy  Med Name: QUETIAPINE 50MG  TABLETS]; TAKE 1 TO 2 TABLETS BY MOUTH AT BEDTIME AS NEEDED  Dispense: 60 tablet; Refill: 11    If protocol passes may refill for 12 months if within 3 months of last provider visit (or a total of 15 months).                QUEtiapine (SEROQUEL) 50 MG tablet [Pharmacy Med Name: QUETIAPINE 50MG  TABLETS] 60 tablet 11     Sig: TAKE 1 TO 2 TABLETS BY MOUTH AT BEDTIME AS NEEDED       There is no refill protocol information for this order

## 2021-06-15 NOTE — TELEPHONE ENCOUNTER
RN cannot approve Refill Request    RN can NOT refill this medication Protocol failed and NO refill given. Last office visit: 1/31/2020 Alejandro Villalpando MD Last Physical: Visit date not found Last MTM visit: Visit date not found Last visit same specialty: 1/31/2020 Alejandro Villalpando MD.  Next visit within 3 mo: Visit date not found  Next physical within 3 mo: Visit date not found      Lon Ricketts, [facility] Triage/Med Refill 2/8/2021    Requested Prescriptions   Pending Prescriptions Disp Refills     losartan-hydrochlorothiazide (HYZAAR) 100-25 mg per tablet [Pharmacy Med Name: LOSARTAN/HCTZ 100/25MG TABLETS] 90 tablet 0     Sig: TAKE 1 TABLET BY MOUTH DAILY       Diuretics/Combination Diuretics Refill Protocol  Failed - 2/8/2021  6:23 AM        Failed - Serum Potassium in past 12 months      No results found for: LN-POTASSIUM          Failed - Serum Sodium in past 12 months      No results found for: LN-SODIUM          Failed - Blood pressure on file in past 12 months     BP Readings from Last 1 Encounters:   01/31/20 132/84             Failed - Serum Creatinine in past 12 months      Creatinine   Date Value Ref Range Status   01/31/2020 1.24 0.70 - 1.30 mg/dL Final             Passed - Visit with PCP or prescribing provider visit in past 12 months     Last office visit with prescriber/PCP: 1/31/2020 Alejandro Villalpando MD OR same dept: Visit date not found OR same specialty: 1/31/2020 Alejandro Villalpando MD  Last physical: Visit date not found Last MTM visit: Visit date not found   Next visit within 3 mo: Visit date not found  Next physical within 3 mo: Visit date not found  Prescriber OR PCP: Alejandro Villalpando MD  Last diagnosis associated with med order: 1. Hypertension  - losartan-hydrochlorothiazide (HYZAAR) 100-25 mg per tablet [Pharmacy Med Name: LOSARTAN/HCTZ 100/25MG TABLETS]; TAKE 1 TABLET BY MOUTH DAILY  Dispense: 90 tablet; Refill: 0    If protocol passes may refill for 12 months if within 3 months of  last provider visit (or a total of 15 months).

## 2021-06-16 PROBLEM — F17.209 TOBACCO USE DISORDER, CONTINUOUS: Status: ACTIVE | Noted: 2019-04-16

## 2021-06-16 NOTE — TELEPHONE ENCOUNTER
Telephone Encounter by Tiffanie Littlejohn RN at 2/18/2019  3:28 PM     Author: Tiffanie Littlejohn RN Service: -- Author Type: Registered Nurse    Filed: 2/18/2019  3:28 PM Encounter Date: 2/15/2019 Status: Signed    : Tiffanie Littlejohn RN (Registered Nurse)

## 2021-06-16 NOTE — TELEPHONE ENCOUNTER
Telephone Encounter by Jasmyn De Luna CMA at 4/30/2019 12:03 PM     Author: Jasmyn De Luna CMA Service: -- Author Type: Certified Medical Assistant    Filed: 4/30/2019 12:06 PM Encounter Date: 4/30/2019 Status: Signed    : Jasmyn De Luna CMA (Certified Medical Assistant)       Medication: Adderall  Last Date Filled 4/3/2019 for 60 tablets   pulled: YES     Only PCP Prescribing? : NO  Taken as prescribed from physician notes? YES- 4/16/2019- 1.  Attention deficit disorder:  Les remains on Adderall 40 mg daily.  He feels this helps with concentration.  He denies adverse effects from the medication.  Refill was authorized.  He should be seen back in 6 months.    CSA in last year: YES  Random Utox in last year: Not needed    Opioids + benzodiazepines? NO    Is patient on the Executive Review Team? NO      All responses suggest: Refilling prescription

## 2021-06-16 NOTE — TELEPHONE ENCOUNTER
Telephone Encounter by Yane Pa CMA at 1/17/2019 12:42 PM     Author: Yane Pa CMA Service: -- Author Type: Certified Medical Assistant    Filed: 1/17/2019 12:49 PM Encounter Date: 1/17/2019 Status: Signed    : Yane Pa CMA (Certified Medical Assistant)       Medication: Adderall  Last Date Filled 12/20/18   pulled: YES     Only PCP Prescribing? : YES  Taken as prescribed from physician notes? YES    CSA in last year: YES  Random Utox in last year: YES  (if any of the above answer NO - schedule with PCP)   1.  Attention deficit disorder: Les remains on Adderall XR 20 mg 2 tabs daily.  He has done well on this.  He will have a DOA-1 check today and had a new controlled substance agreement discussed and filled out.  Opioids + benzodiazepines? NO  (if the above answer YES - schedule with PCP every 6 months)     All responses suggest: Refilling prescription

## 2021-06-16 NOTE — TELEPHONE ENCOUNTER
Telephone Encounter by Asha Don CMA at 5/29/2019 10:55 AM     Author: Asha Don CMA Service: -- Author Type: Certified Medical Assistant    Filed: 5/29/2019 10:57 AM Encounter Date: 5/29/2019 Status: Signed    : Asha Don CMA (Certified Medical Assistant)       Medication: dextroamphetamine-amphetamine (ADDERALL XR) 20  Last Date Filled 5/2/19   pulled: YES    Only PCP Prescribing? : YES  Taken as prescribed from physician notes? YES  Les remains on Adderall 40 mg daily.  He feels this helps with concentration.  He denies adverse effects from the medication.  Refill was authorized.  He should be seen back in 6 months    CSA in last year: YES  Random Utox in last year: YES  (if any of the above answer NO - schedule with PCP)     Opioids + benzodiazepines? NO  (if the above answer YES - schedule with PCP every 6 months)     Is patient on the Executive Review Team? NO      All responses suggest: Refilling prescription

## 2021-06-16 NOTE — TELEPHONE ENCOUNTER
Telephone Encounter by Delia Rivero CMA at 12/17/2019 10:14 AM     Author: Delia Rivero CMA Service: -- Author Type: Certified Medical Assistant    Filed: 12/17/2019 10:16 AM Encounter Date: 12/17/2019 Status: Signed    : Delia Rivero CMA (Certified Medical Assistant)

## 2021-06-16 NOTE — TELEPHONE ENCOUNTER
Telephone Encounter by Asha Don CMA at 3/28/2019  1:43 PM     Author: Asha Don CMA Service: -- Author Type: Certified Medical Assistant    Filed: 3/28/2019  1:46 PM Encounter Date: 3/28/2019 Status: Signed    : Asha Don CMA (Certified Medical Assistant)       Medication: Adderall  Last Date Filled 3/18/19   pulled: YES    Only PCP Prescribing? : Yes  Taken as prescribed from physician notes? YES    CSA in last year: YES  Random Utox in last year: NOT needed  (if any of the above answer NO - schedule with PCP)     Opioids + benzodiazepines? NO  (if the above answer YES - schedule with PCP every 6 months)     All responses suggest: pend for covering provider approval

## 2021-06-16 NOTE — TELEPHONE ENCOUNTER
Telephone Encounter by Keke Lopez at 3/29/2019  8:38 AM     Author: Keke Lopez Service: -- Author Type: --    Filed: 3/29/2019  8:38 AM Encounter Date: 3/28/2019 Status: Signed    : Keke Lopez       Per insurance, there is already a PA on file.

## 2021-06-16 NOTE — TELEPHONE ENCOUNTER
Telephone Encounter by Jasmyn De Luna CMA at 6/28/2019  7:29 AM     Author: Jasmyn De Luna CMA Service: -- Author Type: Certified Medical Assistant    Filed: 6/28/2019  7:38 AM Encounter Date: 6/27/2019 Status: Signed    : Jasmyn De Luna CMA (Certified Medical Assistant)       Medication: Adderall  Last Date Filled 5/31/2019 for 60 tablets   pulled: YES     Only PCP Prescribing? : YES  Taken as prescribed from physician notes? YES- 4/16/209- 1.  Attention deficit disorder:  Les remains on Adderall 40 mg daily.  He feels this helps with concentration.  He denies adverse effects from the medication.  Refill was authorized.  He should be seen back in 6 months.    CSA in last year: YES  Random Utox in last year: YES    Opioids + benzodiazepines? NO    Is patient on the Executive Review Team? NO      All responses suggest: Refilling prescription

## 2021-06-16 NOTE — TELEPHONE ENCOUNTER
Telephone Encounter by Yane Pa CMA at 1/16/2020 11:33 AM     Author: Yane Pa CMA Service: -- Author Type: Certified Medical Assistant    Filed: 1/16/2020 11:37 AM Encounter Date: 1/16/2020 Status: Signed    : Yane Pa CMA (Certified Medical Assistant)       Medication: Adderall   Last Date Filled 12/17/19   pulled: YES    Only PCP Prescribing? : YES  Taken as prescribed from physician notes? YES    CSA in last year: YES  Random Utox in last year: Not needed  (if any of the above answer NO - schedule with PCP)     Opioids + benzodiazepines? NO  (if the above answer YES - schedule with PCP every 6 months)     Is patient on the Executive Review Team? NO      All responses suggest: Scheduling with PCP for further intervention     Return in about 3 months (around 1/18/2020) for Recheck.     Scheduled with pcp 1/31/2020

## 2021-06-16 NOTE — TELEPHONE ENCOUNTER
Telephone Encounter by Katie Mcgrath at 3/18/2020 12:11 PM     Author: Katie Mcgrath Service: -- Author Type: --    Filed: 3/18/2020 12:11 PM Encounter Date: 3/17/2020 Status: Signed    : Katie Mcgrath APPROVED:    Approval start date: 1/1/2020  Approval end date:  3/18/2021    Pharmacy has been notified of approval and will contact patient when medication is ready for pickup.

## 2021-06-16 NOTE — TELEPHONE ENCOUNTER
Telephone Encounter by Asha Don CMA at 11/18/2019  9:07 AM     Author: Asha Don CMA Service: -- Author Type: Certified Medical Assistant    Filed: 11/18/2019  9:10 AM Encounter Date: 11/15/2019 Status: Signed    : Asha Don CMA (Certified Medical Assistant)       Medication: dextroamphetamine-amphetamine (ADDERALL XR) 20 MG 24 hr capsule  Last Date Filled 10/18/19   pulled: YES    Only PCP Prescribing? : YES  Taken as prescribed from physician notes? YES  Less remains on Adderall.  He feels it has been of substantial benefit.  Controlled substance agreement was discussed and filled out.  He will have a urine tox screen sent.  He denies use of any controlled substances, but does use marijuana on and off  - dextroamphetamine-amphetamine (ADDERALL XR) 20 MG 24 hr capsule; Take 2 capsules (40 mg total) by mouth every morning.  Dispense: 60 capsule; Refill: 0  - Drug Abuse 1+, Urine  CSA in last year: YES  Random Utox in last year: YES  (if any of the above answer NO - schedule with PCP)     Opioids + benzodiazepines? NO  (if the above answer YES - schedule with PCP every 6 months)     Is patient on the Executive Review Team? No      All responses suggest: pend the med and routed to covering provider approval.

## 2021-06-16 NOTE — TELEPHONE ENCOUNTER
Reason for Call:  Medication or medication refill:    dextroamphetamine-amphetamine (ADDERALL XR) 20 MG 24 hr capsule    Do you use a [facility]?  Name of the pharmacy and phone number for the current request: [facility] [address], [phone]     Name of the medication requested: dextroamphetamine-amphetamine (ADDERALL XR) 20 MG 24 hr capsule    Other request:     Can we leave a detailed message on this number? No call back needed    Phone number patient can be reached at:   Cell number on file:    Telephone Information:   Mobile [phone]       Best Time:     Call taken on 4/13/2021 at 11:31 AM by Jazlyn Funes

## 2021-06-16 NOTE — TELEPHONE ENCOUNTER
Telephone Encounter by Yane Pa CMA at 8/22/2019 10:36 AM     Author: Yane Pa CMA Service: -- Author Type: Certified Medical Assistant    Filed: 8/22/2019 10:39 AM Encounter Date: 8/22/2019 Status: Signed    : Yane Pa CMA (Certified Medical Assistant)       Medication: Adderall   Last Date Filled 7/26/19   pulled: YES    Only PCP Prescribing? : YES  Taken as prescribed from physician notes? Rx is pended with start date of 8/26/19    CSA in last year: YES  Random Utox in last year: Not needed  (if any of the above answer NO - schedule with PCP)     Opioids + benzodiazepines? NO  (if the above answer YES - schedule with PCP every 6 months)     Is patient on the Executive Review Team? NO      All responses suggest: Scheduling with PCP for further intervention     Pt is scheduled with pcp 10/18/19

## 2021-06-16 NOTE — TELEPHONE ENCOUNTER
Telephone Encounter by Delia Rivero CMA at 3/17/2020  2:12 PM     Author: Delia Rivero CMA Service: -- Author Type: Certified Medical Assistant    Filed: 3/17/2020  2:13 PM Encounter Date: 3/17/2020 Status: Signed    : Delia Rivero CMA (Certified Medical Assistant)

## 2021-06-16 NOTE — TELEPHONE ENCOUNTER
Telephone Encounter by Asha Don CMA at 7/25/2019 11:33 AM     Author: Asha Don CMA Service: -- Author Type: Certified Medical Assistant    Filed: 7/25/2019 11:38 AM Encounter Date: 7/24/2019 Status: Signed    : Asha Don CMA (Certified Medical Assistant)       Medication: Adderall   Last Date Filled 06/28/19   pulled: YES    Only PCP Prescribing? : YES  Taken as prescribed from physician notes? YES    CSA in last year: YES  Random Utox in last year: YES  (if any of the above answer NO - schedule with PCP)     Opioids + benzodiazepines? NO  (if the above answer YES - schedule with PCP every 6 months)     Is patient on the Executive Review Team? NO      All responses suggest: Refilling prescription

## 2021-06-16 NOTE — TELEPHONE ENCOUNTER
Telephone Encounter by Asha Don CMA at 2/14/2020  2:12 PM     Author: Asha Don CMA Service: -- Author Type: Certified Medical Assistant    Filed: 2/14/2020  2:14 PM Encounter Date: 2/14/2020 Status: Signed    : Asha Don CMA (Certified Medical Assistant)       Medication: Adderall  Last Date Filled 01/17/2020   pulled: YES    Only PCP Prescribing? : YES  Taken as prescribed from physician notes? YES  Doing well on Adderall.  A new controlled substance agreement was discussed and signed.  He has tested positive for marijuana in the past.  Reports that he has used this occasionally, but not on a regular basis  CSA in last year: YES  Random Utox in last year: YES  (if any of the above answer NO - schedule with PCP)     Opioids + benzodiazepines? NO  (if the above answer YES - schedule with PCP every 6 months)     Is patient on the Executive Review Team? No      All responses suggest: Refilling prescription

## 2021-06-17 NOTE — PATIENT INSTRUCTIONS - HE
1.  For seasonal allergies:  Use a nonsedating antihistamine such as Claritin or Allegra.  I have sent a prescription for Claritin to your pharmacy  Also add a nasal steroid.  I have sent a prescription for fluticasone spray to your pharmacy.  Doses 2 sprays each nostril once daily.  Take during the season and symptoms are troublesome.  You may skip it at other times of the year.    2.  Covid vaccine is advised.    3.  Refill for Adderall was sent to your pharmacy    4.  Schedule annual wellness visit in the future.  You are due for general follow-up and lab studies

## 2021-06-17 NOTE — TELEPHONE ENCOUNTER
Telephone Encounter by Yane Pa CMA at 6/12/2020  1:54 PM     Author: Yane Pa CMA Service: -- Author Type: Certified Medical Assistant    Filed: 6/12/2020  1:55 PM Encounter Date: 6/12/2020 Status: Signed    : Yane Pa CMA (Certified Medical Assistant)       Medication: Adderall  Last Date Filled 5/14/2020   pulled: YES    Only PCP Prescribing? : YES  Taken as prescribed from physician notes? YES    CSA in last year: YES  Random Utox in last year: Not needed  (if any of the above answer NO - schedule with PCP)     Opioids + benzodiazepines? NO  (if the above answer YES - schedule with PCP every 6 months)     Is patient on the Executive Review Team? NO      All responses suggest: Refilling prescription

## 2021-06-17 NOTE — TELEPHONE ENCOUNTER
Telephone Encounter by Quynh Carty LPN at 3/15/2021  8:34 AM     Author: Quynh Carty LPN Service: -- Author Type: Licensed Nurse    Filed: 3/15/2021  8:43 AM Encounter Date: 3/15/2021 Status: Signed    : Quynh Carty LPN (Licensed Nurse)       Medication: Adderall XR 20 mg  Last Date Filled 2/13/21   pulled: YES        Only PCP Prescribing? : YES  Taken as prescribed from physician notes? YES    CSA in last year: NO  Random Utox in last year: NO    Opioids + benzodiazepines? NO     Last office visit regarding Adderall was 11/3/20 with Dr. Villalpando    Is patient on the Executive Review Team? NO      All responses suggest: Refilling prescription

## 2021-06-17 NOTE — TELEPHONE ENCOUNTER
Telephone Encounter by Linda Moreno LPN at 1/13/2021 12:14 PM     Author: Linda Moreno LPN Service: -- Author Type: Licensed Nurse    Filed: 1/13/2021 12:17 PM Encounter Date: 1/13/2021 Status: Signed    : Linda Moreno LPN (Licensed Nurse)       Medication: Adderall  Last Date Filled 12/16/20   pulled: YES       Only PCP Prescribing? : YES  Taken as prescribed from physician notes? YES    CSA in last year: NO  Random Utox in last year: NO  (if any of the above answer NO - schedule with PCP)     Opioids + benzodiazepines? NO  (if the above answer YES - schedule with PCP every 6 months)     Is patient on the Executive Review Team? no      All responses suggest: Refilling prescription

## 2021-06-17 NOTE — TELEPHONE ENCOUNTER
Telephone Encounter by Yane Pa CMA at 5/13/2020 12:11 PM     Author: Yane Pa CMA Service: -- Author Type: Certified Medical Assistant    Filed: 5/13/2020 12:13 PM Encounter Date: 5/13/2020 Status: Signed    : Yane Pa CMA (Certified Medical Assistant)       Medication: Adderall   Last Date Filled 4/15/2020   pulled: YES    Only PCP Prescribing? : YES  Taken as prescribed from physician notes? YES    CSA in last year: NO  Random Utox in last year: Not needed  (if any of the above answer NO - schedule with PCP)     Opioids + benzodiazepines? NO  (if the above answer YES - schedule with PCP every 6 months)     Is patient on the Executive Review Team? NO      All responses suggest: Refilling prescription

## 2021-06-17 NOTE — PROGRESS NOTES
"IN:  OUT:    ASSESSMENT:  1.  Attention deficit disorder: He feels that he does not does well with sustained release Adderall.  He denies adverse effects from the current dose and wishes to continue it.    2.  Bipolar affective disorder: He is now only using Seroquel \"as needed \", rather than every night.  He averages taking it about 3-4 days weekly.  He scores 1 on PHQ 9, and 1 on MANAV 7.  Overall, he seems to be doing well    3.  History of alcohol abuse: He remains abstinent from alcohol and does go to AA meetings periodically.    4.  Tobacco use: He smokes an average of 9 cigarettes daily.  Methods for smoking cessation were discussed.  He is interested in trying Nicorette gum as a nicotine replacement product.  He was encouraged to pursue this.    5.  Essential hypertension: Good control on current regimen    6.  Health maintenance: He has not yet completed: Regard testing for colon cancer screening.  He was encouraged to complete this.  Shingrix vaccine was advised.  He will check insurance    PLAN:  1.  A new controlled substance agreement was discussed and filled out for use of Adderall.  He had a DOA-1 completed in September.  He did test positive for THC, but no other unexpected substances.  2.  Refill for Adderall was authorized.  He should schedule follow-up in 6 months.  3.  Complete: cologuard.  4.  Advise Nicorette gum for smoking cessation.  Time for tobacco counseling was over 5 minutes.    No orders of the defined types were placed in this encounter.    Medications Discontinued During This Encounter   Medication Reason     QUEtiapine (SEROQUEL) 50 MG tablet Drug interaction     losartan-hydrochlorothiazide (HYZAAR) 100-25 mg per tablet Duplicate order     QUEtiapine (SEROQUEL) 50 MG tablet Reorder     dextroamphetamine-amphetamine (ADDERALL XR) 20 MG 24 hr capsule Reorder       No Follow-up on file.    ASSESSED PROBLEMS:  1. Bipolar disorder  QUEtiapine (SEROQUEL) 50 MG tablet   2. ADHD (attention " deficit hyperactivity disorder)  dextroamphetamine-amphetamine (ADDERALL XR) 20 MG 24 hr capsule       CHIEF COMPLAINT:  Chief Complaint   Patient presents with     Follow-up     6 months        HISTORY OF PRESENT ILLNESS:  Socrates is a 62 y.o. male presenting to the clinic today for medication review.     ADD: He is doing well on Adderall 40mg every morning. He feels the dosage is appropriate.     Hypertension: He continues on amlodipine 5mg and losartan-hydrocholorothiazide 100-25mg daily. His blood pressure today is 124/82 and MD wallis is 136/82.  He reports medication compliance and has no problems with his medications.     Insomnia: He reports his sleep is sporadic. He takes Seroquel 3-4 times per week. He finds it increases his appetite.     Tobacco Cessation: He continues to smoke about 9 cigarettes per day, but is determined to quit. He intends to try and quit with lozenges in a month. He is reluctant to use a nicotine patch due to a side effect of nightmares. He says he already has vivid dreams.     Health Maintenance: He has a Cologuard kit at home but has not yet completed it. He does not get yearly flu shots. He was advised on Shingrix.     REVIEW OF SYSTEMS:   He reports no problems with any of his medications.   He is scheduled for a tooth extraction.    He has left hand deformity following a previous fracture. He manages well, though he has problems with gripping.    All other systems are negative.    PFSH:  He continues to abstain from alcohol. He still occasionally attends AA and meets with his [employer] group 2-3 times per week for support.     TOBACCO USE:  History   Smoking Status     Former Smoker     Packs/day: 0.50     Types: Cigarettes   Smokeless Tobacco     Never Used       VITALS:  Vitals:    04/10/18 0929 04/10/18 1006   BP: 124/82 136/82   Patient Site: Left Arm Right Arm   Patient Position: Sitting Sitting   Cuff Size: Adult Regular    Pulse: 80    Weight: 169 lb 4.8 oz (76.8 kg)      Wt  Readings from Last 3 Encounters:   04/10/18 169 lb 4.8 oz (76.8 kg)   09/28/17 171 lb 12.8 oz (77.9 kg)   06/04/17 175 lb (79.4 kg)     Body mass index is 25 kg/(m^2).      MEDICATIONS:  Current Outpatient Prescriptions   Medication Sig Dispense Refill     amLODIPine (NORVASC) 5 MG tablet TAKE 1 TABLET(5 MG) BY MOUTH DAILY 90 tablet 3     ibuprofen (ADVIL,MOTRIN) 600 MG tablet Take 1 tablet (600 mg total) by mouth 3 (three) times a day. 90 tablet 3     losartan-hydrochlorothiazide (HYZAAR) 100-25 mg per tablet Take 1 tablet by mouth daily. 90 tablet 0     QUEtiapine (SEROQUEL) 50 MG tablet Take 1 tablet (50 mg total) by mouth at bedtime. 90 tablet 3     artificial tears, hypromellose, (GONIOVISC) 2.5 % ophthalmic solution Administer 1-2 drops to both eyes 4 (four) times a day as needed. 15 mL 12     dextroamphetamine-amphetamine (ADDERALL XR) 20 MG 24 hr capsule Take 2 capsules (40 mg total) by mouth every morning. 60 capsule 0     QUEtiapine (SEROQUEL) 50 MG tablet TAKE 1 TABLET(50 MG) BY MOUTH AT BEDTIME AS NEEDED 90 tablet 3     No current facility-administered medications for this visit.        PHYSICAL EXAM:  Constitutional:   Reveals an alert, talkative male. Affect appropriate. Does not seem acutely ill. Vitals: per nursing notes. MD recheck of BP is 136/82.   HEENT:  Atraumatic.     Eyes:  No conjunctival hyperemia.   Oropharynx:   Broken molar on left lower jaw. Posterior pharynx clear.   Neck:  Supple, no carotid bruits or adenopathy.  Back:  No spine or CVA pain.  Thorax:  No bony deformities.  Lungs: Clear to A&P without rales or wheezes.  Respiratory effort normal.  Cardiac:   Regular rate and rhythm, normal S1, S2, no murmur.  Abdomen:  Soft, active bowel sounds without bruits, mass, or tenderness.  Extremities:  Deformity of left middle finger with some limitation to flexion and extension. No peripheral edema.    Skin:  No lesions to suggest malignancy.  Neuro:  Alert and oriented. Cranial nerves,  motor, sensory exams are intact.  No gross focal deficits.  PHQ 9:1  MANAV 7: 1    QUALITY MEASURES:  I have counseled the patient for tobacco cessation and the follow up will occur  at the next visit.    ADDITIONAL HISTORY SUMMARIZED (2): None.  DECISION TO OBTAIN EXTRA INFORMATION (1): None.   RADIOLOGY TESTS (1): None.  LABS (1): None.  MEDICINE TESTS (1): None.  INDEPENDENT REVIEW (2 each): None.     The visit lasted a total of 15 minutes face to face with the patient. Over 50% of the time was spent counseling and educating the patient about . An additional 4 minutes was spent discussing tobacco cessation.     ICarmelo, am scribing for and in the presence of, Dr. Villalpando.    IAlejandro, personally performed the services described in this documentation, as scribed by Carmelo Mir in my presence, and it is both accurate and complete.    Dragon dictation was used for this note.  Speech recognition errors are a possibility.      Total data points: 0

## 2021-06-17 NOTE — TELEPHONE ENCOUNTER
Socrates is requesting a refill for Amlodipine and is requesting a thirty day supply instead of a 90 day supply.    Please send to [facility] in [place] on [facility].    COVID 19 Nurse Triage Plan/Patient Instructions    Please be aware that novel coronavirus (COVID-19) may be circulating in the community. If you develop symptoms such as fever, cough, or SOB or if you have concerns about the presence of another infection including coronavirus (COVID-19), please contact your health care provider or visit  [email or web address].    Disposition/Instructions    Home care recommended. Follow home care protocol based instructions.    Thank you for taking steps to prevent the spread of this virus.  o Limit your contact with others.  o Wear a simple mask to cover your cough.  o Wash your hands well and often.    Resources    [facility]: About COVID-19: [email or web address]    CDC: What to Do If You're Sick: www.cdc.gov/coronavirus/2019-ncov/about/steps-when-sick.html    CDC: Ending Home Isolation: www.cdc.gov/coronavirus/2019-ncov/hcp/disposition-in-home-patients.html     CDC: Caring for Someone: www.cdc.gov/coronavirus/2019-ncov/if-you-are-sick/care-for-someone.html     [facility]: Interim Guidance for Hospital Discharge to Home: www.health.[facility].[place].us/diseases/coronavirus/hcp/hospdischarge.pdf    [facility] clinical trials (COVID-19 research studies): [email or web address]     Below are the COVID-19 hotlines at the [facility] ([facility]). Interpreters are available.   o For health questions: Call [phone] or 1-189.729.9058 (7 a.m. to 7 p.m.)  o For questions about schools and childcare: Call [phone] or 1-735.384.1599 (7 a.m. to 7 p.m.)     Reason for Disposition    [1] Caller requesting a NON-URGENT new prescription or refill AND [2] triager unable to refill per unit policy    Additional Information    Negative: MORE THAN A DOUBLE DOSE  "of a prescription or over-the-counter (OTC) drug    Negative: [1] DOUBLE DOSE (an extra dose or lesser amount) of over-the-counter (OTC) drug AND [2] any symptoms (e.g., dizziness, nausea, pain, sleepiness)    Negative: [1] DOUBLE DOSE (an extra dose or lesser amount) of prescription drug AND [2] any symptoms (e.g., dizziness, nausea, pain, sleepiness)    Negative: Took another person's prescription drug    Negative: [1] DOUBLE DOSE (an extra dose or lesser amount) of prescription drug AND [2] NO symptoms (Exception: a double dose of antibiotics)    Negative: Diabetes drug error or overdose (e.g., took wrong type of insulin or took extra dose)    Negative: [1] Request for URGENT new prescription or refill of \"essential\" medication (i.e., likelihood of harm to patient if not taken) AND [2] triager unable to fill per unit policy    Negative: [1] Prescription not at pharmacy AND [2] was prescribed by PCP recently    Negative: [1] Pharmacy calling with prescription questions AND [2] triager unable to answer question    Negative: [1] Caller has URGENT medication question about med that PCP or specialist prescribed AND [2] triager unable to answer question    Negative: [1] Caller has NON-URGENT medication question about med that PCP prescribed AND [2] triager unable to answer question    Protocols used: MEDICATION QUESTION CALL-A-AH      "

## 2021-06-17 NOTE — TELEPHONE ENCOUNTER
Reason for Call:  Medication or medication refill:    dextroamphetamine-amphetamine (ADDERALL XR) 20 MG 24 hr capsule    Do you use a [facility]?  [place] of [place]     Name of the medication requested: dextroamphetamine-amphetamine (ADDERALL XR) 20 MG 24 hr capsule    Other request: Pt is also requesting a different sleeping medication. He is currently using QUEtiapine (SEROQUEL) 50 MG tablet. This is causing problems on his body. He would like to go to a Benedryl. He used a red and white tablet in the [place] that worked well.     Can we leave a detailed message on this number? Yes    Phone number patient can be reached at:   Cell number on file:    Telephone Information:   Mobile [phone]       Best Time:     Call taken on 5/25/2021 at 9:11 AM by Jazlyn Funes

## 2021-06-17 NOTE — TELEPHONE ENCOUNTER
Telephone Encounter by Yane Pa CMA at 4/15/2020  3:06 PM     Author: Yane Pa CMA Service: -- Author Type: Certified Medical Assistant    Filed: 4/15/2020  3:09 PM Encounter Date: 4/15/2020 Status: Signed    : aYne Pa CMA (Certified Medical Assistant)       Medication: Adderall  Last Date Filled 3/18/2020   pulled: YES    Only PCP Prescribing? : YES  Taken as prescribed from physician notes? YES    CSA in last year: YES  Random Utox in last year: Not needed  (if any of the above answer NO - schedule with PCP)     Opioids + benzodiazepines? NO  (if the above answer YES - schedule with PCP every 6 months)     Is patient on the Executive Review Team? NO      All responses suggest: Refilling prescription    Return in about 3 months (around 4/30/2020) for Recheck.

## 2021-06-17 NOTE — TELEPHONE ENCOUNTER
Telephone Encounter by Yane Pa CMA at 11/13/2020 10:17 AM     Author: Yane Pa CMA Service: -- Author Type: Certified Medical Assistant    Filed: 11/13/2020 10:51 AM Encounter Date: 11/13/2020 Status: Signed    : Yane Pa CMA (Certified Medical Assistant)       Medication: Adderall  Last Date Filled 10/15/2020   pulled: YES    Only PCP Prescribing? : YES  Taken as prescribed from physician notes? YES    CSA in last year: NO  Random Utox in last year: NO  (if any of the above answer NO - schedule with PCP)     Opioids + benzodiazepines? NO  (if the above answer YES - schedule with PCP every 6 months)     Is patient on the Executive Review Team? NO      All responses suggest: Refilling prescription

## 2021-06-17 NOTE — PROGRESS NOTES
Socrates is a 65 y.o. male contacting the clinic today via video, who will use the platform: [employer] for the visit.  Phone # for Dox[employer]ity, or if Amwell drops:   Telephone Information:   Mobile [phone]        ASSESSMENT:  1.  Seasonal allergies  Last complains of congestion, sneezing, and eye irritation when riding his bike over the last several weeks.  He has had similar symptoms seasonally in the past.  This seems most consistent with seasonal allergic rhinitis.  He was advised to use a nonsedating antihistamine and nasal steroid spray    2.  Attention deficit disorder:  He requests a refill of Adderall XR.  He takes 40 mg in the a.m.  He has done well on this regimen    3.  Essential hypertension:  Treated with metoprolol XL 50 mg daily, amlodipine 5 mg and losartan-HCTZ 100/25 mg daily.  He reports blood pressure has been good on this regimen although his machine recently quit working.  He was advised to continue monitoring    4.  Bipolar affective disorder:  He now uses Seroquel intermittently rather than nightly.  He feels he continues to do well    5.  Health maintenance:  He has not yet had Covid vaccination.  He was strongly encouraged to go through with this    Preventive Health Care: Covid Vaccine is advised as above    PLAN:  1.  Claritin 10 mg daily    2.  Nasal fluticasone.  2 sprays each nostril daily.  Use during the season when allergic symptoms are troublesome.  He may skip it the rest of the year    3.  Refill Adderall    4.  Covid vaccine advised    5.  Schedule annual wellness visit in the future.  Is due for general follow-up and laboratory monitoring.        CHIEF COMPLAINT:  Chief Complaint   Patient presents with     Seasonal Allergies     pt states sneezing after bike riding       HISTORY OF PRESENT ILLNESS:  Socrates is a 65 y.o. male contacting the clinic today via video for evaluation of respiratory complaints.  He notes nasal congestion, sneezing, and watery eyes after riding his bike.  This  has troubled him during the spring.  He has had similar issues seasonally in the past.    He otherwise feels well.  He requests a refill of Adderall XR for attention deficit disorder.    He remains on losartan-HCTZ, amlodipine, and metoprolol XL for hypertension.  States blood pressure has been good when checked.  However, his machine has not been working lately.    He was prescribed Seroquel for bipolar disorder.  He now is using it intermittently rather than nightly.  He feels he is doing well.    He has not yet had the Covid vaccine and is rather uncomfortable with the vaccination    REVIEW OF SYSTEMS:   Negative except as above    PFSH:  Social History     Social History Narrative     Not on file       TOBACCO USE:  Social History     Tobacco Use   Smoking Status Current Every Day Smoker     Packs/day: 0.25     Types: Cigarettes   Smokeless Tobacco Never Used       VITALS:  There were no vitals filed for this visit.  Wt Readings from Last 3 Encounters:   01/31/20 185 lb (83.9 kg)   10/18/19 185 lb (83.9 kg)   04/16/19 181 lb (82.1 kg)       PHYSICAL EXAM:  (observations via Video)  Alert pleasant talkative man with appropriate affect.  He does not seem in acute distress    MEDICATIONS:   Current Outpatient Medications   Medication Sig Dispense Refill     amLODIPine (NORVASC) 5 MG tablet TAKE 1 TABLET(5 MG) BY MOUTH DAILY 90 tablet 3     ibuprofen (ADVIL,MOTRIN) 600 MG tablet Take 1 tablet (600 mg total) by mouth every 8 (eight) hours as needed for pain. 90 tablet 3     losartan-hydrochlorothiazide (HYZAAR) 100-25 mg per tablet TAKE 1 TABLET BY MOUTH DAILY 90 tablet 3     metoprolol succinate (TOPROL-XL) 50 MG 24 hr tablet TAKE 1 TABLET BY MOUTH EVERY DAY 90 tablet 3     nicotine polacrilex (NICORETTE) 4 MG gum Apply 1 each (4 mg total) to the mouth or throat as needed for smoking cessation. 160 each 5     QUEtiapine (SEROQUEL) 50 MG tablet TAKE 1 TO 2 TABLETS BY MOUTH AT BEDTIME AS NEEDED 60 tablet 11      dextroamphetamine-amphetamine (ADDERALL XR) 20 MG 24 hr capsule Take 2 capsules (40 mg total) by mouth every morning. 60 capsule 0     No current facility-administered medications for this visit.          Notes summarized: Chart reviewed  Labs, x-rays, cardiology, GI tests reviewed: Reviewed labs reviewed  New orders: Ears as above          The visit lasted a total of 25 minutesfor patient visit, chart review, and documentation     Patient has given verbal consent to a Video visit?  Yes  How would you like to obtain your AVS?  MyChart  Will anyone else be joining your video visit? No       Video-Visit Details  Type of service:  Video Visit  Originating Location (pt. Location): Home  Distant Location (provider location):   [facility]       Linda Moreno LPN

## 2021-06-17 NOTE — TELEPHONE ENCOUNTER
Telephone Encounter by Linda Moreno LPN at 8/13/2020 12:03 PM     Author: Linda Moreno LPN Service: -- Author Type: Licensed Nurse    Filed: 8/13/2020 12:09 PM Encounter Date: 8/13/2020 Status: Signed    : Linda Moreno LPN (Licensed Nurse)       Medication: Adderall  Last Date Filled 7/16/20   pulled: YES         Only PCP Prescribing? : YES  Taken as prescribed from physician notes? YES    CSA in last year: YES  Random Utox in last year: YES  (if any of the above answer NO - schedule with PCP)     Opioids + benzodiazepines? NO  (if the above answer YES - schedule with PCP every 6 months)     Is patient on the Executive Review Team? no      All responses suggest: Refilling prescription

## 2021-06-17 NOTE — TELEPHONE ENCOUNTER
Telephone Encounter by Yane Pa CMA at 10/14/2020  9:19 AM     Author: Yane Pa CMA Service: -- Author Type: Certified Medical Assistant    Filed: 10/14/2020  9:21 AM Encounter Date: 10/14/2020 Status: Signed    : Yane Pa CMA (Certified Medical Assistant)       Medication: Adderall  Last Date Filled 9/15/2020   pulled: YES    Only PCP Prescribing? : YES  Taken as prescribed from physician notes? YES    CSA in last year: YES  Random Utox in last year: Not needed  (if any of the above answer NO - schedule with PCP)     Opioids + benzodiazepines? NO  (if the above answer YES - schedule with PCP every 6 months)     Is patient on the Executive Review Team? NO      All responses suggest: Scheduling with PCP for further intervention

## 2021-06-17 NOTE — TELEPHONE ENCOUNTER
Telephone Encounter by Linda Moreno LPN at 9/15/2020  3:09 PM     Author: Linda Moreno LPN Service: -- Author Type: Licensed Nurse    Filed: 9/15/2020  3:19 PM Encounter Date: 9/15/2020 Status: Signed    : Linda Moreno LPN (Licensed Nurse)       Medication: Adderall  Last Date Filled 8/14/20   pulled: YES         Only PCP Prescribing? : YES  Taken as prescribed from physician notes? YES    CSA in last year: YES  Random Utox in last year: YES  (if any of the above answer NO - schedule with PCP)     Opioids + benzodiazepines? NO  (if the above answer YES - schedule with PCP every 6 months)     Is patient on the Executive Review Team? no      All responses suggest: Refilling Prescription

## 2021-06-17 NOTE — TELEPHONE ENCOUNTER
Telephone Encounter by Linda Moreno LPN at 7/14/2020  4:51 PM     Author: Linda Moreno LPN Service: -- Author Type: Licensed Nurse    Filed: 7/14/2020  4:54 PM Encounter Date: 7/13/2020 Status: Signed    : Linda Moreno LPN (Licensed Nurse)       Medication: Adderall  Last Date Filled 6/15/20   pulled: YES         Only PCP Prescribing? : YES  Taken as prescribed from physician notes? YES OV 4/21/20    3.  Attention deficit disorder:  Uses Adderall XR 40 mg in the a.m.  He states he feels well on this regimen       CSA in last year: YES  Random Utox in last year: YES  (if any of the above answer NO - schedule with PCP)     Opioids + benzodiazepines? NO  (if the above answer YES - schedule with PCP every 6 months)     Is patient on the Executive Review Team? no      All responses suggest: Refilling prescription

## 2021-06-19 NOTE — LETTER
Letter by Alejandro Villalpando MD at      Author: Alejandro Villalpando MD Service: -- Author Type: --    Filed:  Encounter Date: 10/18/2019 Status: Signed         [facility] INTERNAL MEDICINE  10/18/19    Patient: Socrates Paez  YOB: 1955  Medical Record Number: [id number]  CSN: [id number]                                                                              Non-opioid Controlled Substance Agreement    I understand that my care provider has prescribed a controlled substance to help manage my condition(s). I am taking this medicine to help me function or work. I know this is strong medicine, and that it can cause serious side effects. Controlled substances can be sedating, addicting and may cause a dependency on the drug. They can affect my ability to drive or think, and cause depression. They need to be taken exactly as prescribed. Combining controlled substances with certain medicines or chemicals (such as cocaine, sedatives and tranquilizers, sleeping pills, meth) can be dangerous or even fatal. Also, if I stop controlled substances suddenly, I may have severe withdrawal symptoms.  If not helpful, I may be asked to stop them.    The risks, benefits, and side effects of these medicine(s) were explained to me. I agree that:    1. I will take part in other treatments as advised by my care team. This may be psychiatry or counseling, physical therapy, behavioral therapy, group treatment or a referral to a pain clinic. I will reduce or stop my medicine when my care team tells me to do so.  2. I will take my medicines as prescribed. I will not change the dose or schedule unless my care team tells me to. There will be no refills if I run out early.  I may be contactedwithout warning and asked to complete a urine drug test or pill count at any time.   3. I will keep all my appointments, and understand this is part of the monitoring of controlled substances. My care team may require an office visit for EVERY controlled  substance refill. If I miss appointments or dont follow instructions, my care team may stop my medicine.  4. I will not ask other providers to prescribe controlled substances, and I will not accept controlled substances from other people. If I need another prescribed controlled substance for a new reason, I will tell my care team within 1 business day.  5. I will use one pharmacy to fill all of my controlled substance prescriptions, and it is up to me to make sure that I do not run out of my medicines on weekends or holidays. If my care team is willing to refill my controlled substance prescription without a visit, I must request refills only during office hours, refills may take up to 3 days to process, and it may take up to 5 to 7 days for my medicine to be mailed and ready at my pharmacy. Prescriptions will not be mailed anywhere except my pharmacy.    6. I am responsible for my prescriptions. If the medicine/prescription is lost or stolen, it will not be replaced. I also agree not to share controlled substance medicines with anyone.          [facility] INTERNAL MEDICINE  10/18/19  Patient:  Socrates Paez  YOB: 1955  Medical Record Number: [id number]  CSN: [id number]    7. I agree to not use ANY illegal or recreational drugs. This includes marijuana, cocaine, bath salts or other drugs. I agree not to use alcohol unless my care team says I may. I agree to give urine samples whenever asked. If I dont give a urine sample, the care team may stop my medicine.    8. If I enroll in the [place] Medical Marijuana program, I will tell my care team. I will also sign an agreement to share my medical records with my care team.    9. I will bring in my list of medicines (or my medicine bottles) each time I come to the clinic.   10. I will tell my care team right away if I become pregnant or have a new medical problem treated outside of my regular clinic.  11. I understand that this medicine can affect my thinking and  judgment. It may be unsafe for me to drive, use machinery and do dangerous tasks. I will not do any of these things until I know how the medicine affects me. If my dose changes, I will wait to see how it affects me. I will contact my care team if I have concerns about medicine side effects.    I understand that if I do not follow any of the conditions above, my prescriptions or treatment may be stopped.      I agree that my provider, clinic care team, and pharmacy may work with any city, state or federal law enforcement agency that investigates the misuse, sale, or other diversion of my controlled medicine. I will allow my provider to discuss my care with or share a copy of this agreement with any other treating provider, pharmacy or emergency room where I receive care. I agree to give up (waive) any right of privacy or confidentiality with respect to these consents.   I have read this agreement and have asked questions about anything I did not understand.    ___________________________________________________________________________  Patient signature - Date/Time  -Socrates Paez                                      ___________________________________________________________________________  Witness signature                                                                    ___________________________________________________________________________  Provider signature- Alejandro Villalpando MD

## 2021-06-20 NOTE — LETTER
Letter by Alejandro Villalpando MD at      Author: Alejandro Villalpando MD Service: -- Author Type: --    Filed:  Encounter Date: 1/31/2020 Status: (Other)         [facility] INTERNAL MEDICINE  01/31/20    Patient: Socrates Paez  YOB: 1955  Medical Record Number: [id number]  CSN: [id number]                                                                              Non-opioid Controlled Substance Agreement    I understand that my care provider has prescribed a controlled substance to help manage my condition(s). I am taking this medicine to help me function or work. I know this is strong medicine, and that it can cause serious side effects. Controlled substances can be sedating, addicting and may cause a dependency on the drug. They can affect my ability to drive or think, and cause depression. They need to be taken exactly as prescribed. Combining controlled substances with certain medicines or chemicals (such as cocaine, sedatives and tranquilizers, sleeping pills, meth) can be dangerous or even fatal. Also, if I stop controlled substances suddenly, I may have severe withdrawal symptoms.  If not helpful, I may be asked to stop them.    The risks, benefits, and side effects of these medicine(s) were explained to me. I agree that:    1. I will take part in other treatments as advised by my care team. This may be psychiatry or counseling, physical therapy, behavioral therapy, group treatment or a referral to a pain clinic. I will reduce or stop my medicine when my care team tells me to do so.  2. I will take my medicines as prescribed. I will not change the dose or schedule unless my care team tells me to. There will be no refills if I run out early.  I may be contactedwithout warning and asked to complete a urine drug test or pill count at any time.   3. I will keep all my appointments, and understand this is part of the monitoring of controlled substances. My care team may require an office visit for EVERY controlled  substance refill. If I miss appointments or dont follow instructions, my care team may stop my medicine.  4. I will not ask other providers to prescribe controlled substances, and I will not accept controlled substances from other people. If I need another prescribed controlled substance for a new reason, I will tell my care team within 1 business day.  5. I will use one pharmacy to fill all of my controlled substance prescriptions, and it is up to me to make sure that I do not run out of my medicines on weekends or holidays. If my care team is willing to refill my controlled substance prescription without a visit, I must request refills only during office hours, refills may take up to 3 days to process, and it may take up to 5 to 7 days for my medicine to be mailed and ready at my pharmacy. Prescriptions will not be mailed anywhere except my pharmacy.    6. I am responsible for my prescriptions. If the medicine/prescription is lost or stolen, it will not be replaced. I also agree not to share controlled substance medicines with anyone.          [facility] INTERNAL MEDICINE  01/31/20  Patient:  Socrates Paez  YOB: 1955  Medical Record Number: [id number]  CSN: [id number]    7. I agree to not use ANY illegal or recreational drugs. This includes marijuana, cocaine, bath salts or other drugs. I agree not to use alcohol unless my care team says I may. I agree to give urine samples whenever asked. If I dont give a urine sample, the care team may stop my medicine.    8. If I enroll in the [place] Medical Marijuana program, I will tell my care team. I will also sign an agreement to share my medical records with my care team.    9. I will bring in my list of medicines (or my medicine bottles) each time I come to the clinic.   10. I will tell my care team right away if I become pregnant or have a new medical problem treated outside of my regular clinic.  11. I understand that this medicine can affect my thinking and  judgment. It may be unsafe for me to drive, use machinery and do dangerous tasks. I will not do any of these things until I know how the medicine affects me. If my dose changes, I will wait to see how it affects me. I will contact my care team if I have concerns about medicine side effects.    I understand that if I do not follow any of the conditions above, my prescriptions or treatment may be stopped.      I agree that my provider, clinic care team, and pharmacy may work with any city, state or federal law enforcement agency that investigates the misuse, sale, or other diversion of my controlled medicine. I will allow my provider to discuss my care with or share a copy of this agreement with any other treating provider, pharmacy or emergency room where I receive care. I agree to give up (waive) any right of privacy or confidentiality with respect to these consents.   I have read this agreement and have asked questions about anything I did not understand.    ___________________________________________________________________________  Patient signature - Date/Time  -Socrates Paez                                      ___________________________________________________________________________  Witness signature                                                                    ___________________________________________________________________________  Provider signature- Alejandro Villalpando MD

## 2021-06-20 NOTE — PROGRESS NOTES
ASSESSMENT:  1.  Attention deficit disorder: Les remains on Adderall XR 20 mg 2 tabs daily.  He has done well on this.  He will have a DOA-1 check today and had a new controlled substance agreement discussed and filled out.    2.  Bipolar affective disorder in partial remission: He overall is doing well.  He uses Seroquel  mg at bedtime.  He notes that it tends to boost his appetite.  He is working to avoid weight gain    3.  Essential hypertension: Good control on current regimen.  He denies adverse effects from amlodipine or losartan-HCTZ.    4.  Prostate cancer screening: PSA will be checked    5.  Hypercholesterolemia: Lipid Cascade will be checked today    6.  Tobacco use: He is back to smoking about 8 cigarettes daily.  I would not advise Chantix given his psychiatric history.  Nicotine replacement products such as Nicorette gum were recommended.    7.  Colon cancer screening: He was given cologuard at last visit, but has not yet completed it.  He states he will send it in this week    8.  Health maintenance: He declines a flu shot.  He is interested in shingles vaccine.  He was advised to check insurance for Shingrix.    PLAN:  1.  Labs as outlined.  He will be notified of test results.  2.  Smoking cessation methods were discussed for 5-minutes  3.   Controlled substance agreement was discussed and filled out.  4.  Medications were updated and reviewed.  See in 6 months or as needed.    Orders Placed This Encounter   Procedures     Drug Abuse 1+, Urine     PSA (Prostatic-Specific Antigen), Annual Screen     Comprehensive Metabolic Panel     HM2(CBC w/o Differential)     Urinalysis-UC if Indicated     Lipid Cascade     Order Specific Question:   Fasting is required?     Answer:   Unknown     Medications Discontinued During This Encounter   Medication Reason     losartan-hydrochlorothiazide (HYZAAR) 100-25 mg per tablet Duplicate order     QUEtiapine (SEROQUEL) 50 MG tablet Duplicate order      "oxyCODONE-acetaminophen (PERCOCET) 5-325 mg per tablet Therapy completed     QUEtiapine (SEROQUEL) 50 MG tablet Reorder           ASSESSED PROBLEMS:  1. Medication monitoring encounter  Drug Abuse 1+, Urine    Comprehensive Metabolic Panel    HM2(CBC w/o Differential)   2. Bipolar disorder, in partial remission, most recent episode depressed (H)  QUEtiapine (SEROQUEL) 50 MG tablet   3. Benign essential hypertension  Comprehensive Metabolic Panel    Urinalysis-UC if Indicated   4. Screening for prostate cancer  PSA (Prostatic-Specific Antigen), Annual Screen   5. Hypercholesterolemia  Lipid Cascade   6. Encounter for tobacco use cessation counseling     7. Colon cancer screening     8. Health care maintenance         CHIEF COMPLAINT:  Chief Complaint   Patient presents with     Follow-up       HISTORY OF PRESENT ILLNESS:  Socrates is a 63 y.o. male seen for follow-up of attention deficit disorder, essential hypertension, hypercholesterolemia, and other concerns.  Overall, less states he has felt well.  He lives at the [facility].  He is not working, but would be interested in getting a job.  He feels Adderall has been of benefit for concentration.  He is on Seroquel for bipolar affective disorder.  He takes 50 mg 1 or 2 at bedtime.  He sometimes does not take it at all.  Spirits overall have been good.    He remains on amlodipine and losartan-HCTZ for hypertension.  Blood pressure control has been good.    He is back to smoking 8 cigarettes daily after stopping.  He was encouraged to work on smoking cessation again.  A nicotene replacement products such as Nicorette gum was recommended.    A lipid [place] will be checked today.  He tries to stick to a \"healthy \"diet.  He is not on a lipid-lowering agent, and has no known history of ischemic heart disease.    He has a past history of alcohol abuse.  He remains abstinent from alcohol.    REVIEW OF SYSTEMS:    Comprehensive review of systems is negative except as " "mentioned above    PFSH:  .  He still has regular contact with his ex-wife.  Uses a bicycle for transportation.  Lives at [facility].      TOBACCO USE:  History   Smoking Status     Current Every Day Smoker     Packs/day: 0.25     Types: Cigarettes   Smokeless Tobacco     Never Used       VITALS:  Vitals:    10/11/18 1026   BP: 130/82   Patient Site: Left Arm   Patient Position: Sitting   Cuff Size: Adult Regular   Pulse: 78   SpO2: 99%   Weight: 162 lb 1.6 oz (73.5 kg)   Height: 5' 9\" (1.753 m)     Wt Readings from Last 3 Encounters:   10/11/18 162 lb 1.6 oz (73.5 kg)   04/25/18 178 lb (80.7 kg)   04/10/18 169 lb 4.8 oz (76.8 kg)       PHYSICAL EXAM:  Constitutional:   Reveals an alert pleasant healthy-appearing male who does not appear acutely ill.  Vitals: per nursing notes.  HEENT:  Ears:  External canals, TMs clear.    Eyes:  EOMs full, PERRL.  Oropharynx:   Mouth and throat clear, no thrush or exudate.  Neck:  Supple, no carotid bruits or adenopathy.  Back:  No spine or CVA pain.  Thorax:  No bony deformities.  Lungs: Clear to A&P without rales or wheezes.  Respiratory effort normal.  Cardiac:   Regular rate and rhythm, normal S1, S2, no murmur or gallop.  Abdomen:  Soft, active bowel sounds without bruits, mass, or tenderness.  :  (Men) not examined.  Extremities:   No peripheral edema, pulses in the feet intact.    Skin:  No jaundice, peripheral cyanosis or lesions to suggest malignancy.  Neuro:  Alert and oriented. Cranial nerves, motor, sensory exams are intact.  No gross focal deficits.  Psychiatric:  Memory intact, mood appropriate.    DATA REVIEWED:  Additional History from Old Records Summarized (2): None.  Decision to Obtain Records (1): None.   Radiology Tests Summarized or Ordered (1): None.  Labs Reviewed or Ordered (1): None.  Medicine Test Summarized or Ordered (1): None.   Independent Review of EKG or X-RAY(2 each): None.    The visit lasted a total of 28 minutes face to face " with the patient. Over 50% of the time was spent counseling and educating the patient about attention deficit disorder, essential hypertension, smoking cessation.    .    Dragon dictation was used for this note. Speech recognition errors are a possibility.     MEDICATIONS:  Current Outpatient Prescriptions   Medication Sig Dispense Refill     amLODIPine (NORVASC) 5 MG tablet TAKE 1 TABLET(5 MG) BY MOUTH DAILY 90 tablet 2     artificial tears, hypromellose, (GONIOVISC) 2.5 % ophthalmic solution Administer 1-2 drops to both eyes 4 (four) times a day as needed. 15 mL 12     dextroamphetamine-amphetamine (ADDERALL XR) 20 MG 24 hr capsule Take 2 capsules (40 mg total) by mouth every morning. 60 capsule 0     ibuprofen (ADVIL,MOTRIN) 600 MG tablet Take 1 tablet (600 mg total) by mouth 3 (three) times a day. 90 tablet 3     losartan-hydrochlorothiazide (HYZAAR) 100-25 mg per tablet Take 1 tablet by mouth daily. 90 tablet 0     penicillin VK (PEN VK) 500 MG tablet Take 1 tablet (500 mg total) by mouth 2 (two) times a day. 14 tablet 0     QUEtiapine (SEROQUEL) 50 MG tablet One to two tabs at bedtime as needed 60 tablet 11     No current facility-administered medications for this visit.

## 2021-06-20 NOTE — LETTER
Letter by Alejandro Villalpando MD at      Author: Alejandro Villalpando MD Service: -- Author Type: --    Filed:  Encounter Date: 2/3/2020 Status: (Other)         Socrates Paez  [address]  [place] [address]             February 3, 2020         Dear Mr. Paez,    Below are the results from your recent visit:    Resulted Orders   Glycosylated Hemoglobin A1c   Result Value Ref Range    Hemoglobin A1c 6.0 3.5 - 6.0 %   Basic Metabolic Panel   Result Value Ref Range    Sodium 145 136 - 145 mmol/L    Potassium 4.4 3.5 - 5.0 mmol/L    Chloride 101 98 - 107 mmol/L    CO2 28 22 - 31 mmol/L    Anion Gap, Calculation 16 5 - 18 mmol/L    Glucose 170 (H) 70 - 125 mg/dL    Calcium 10.4 8.5 - 10.5 mg/dL    BUN 19 8 - 22 mg/dL    Creatinine 1.24 0.70 - 1.30 mg/dL    GFR MDRD Af Amer >60 >60 mL/min/1.73m2    GFR MDRD Non Af Amer 59 (L) >60 mL/min/1.73m2    Narrative    Fasting Glucose reference range is 70-99 mg/dL per  American Diabetes Association (ADA) guidelines.       Les: Blood sugar was elevated to 170.  The hemoglobin A1c value of 6 indicates that your blood sugar is at the high end of normal on average.  Your labs including the potassium and kidney tests are normal.  It was nice to see you.    Please call with questions or contact us using [employer].    Sincerely,        Electronically signed by Alejandro Villalpando MD

## 2021-06-23 ENCOUNTER — RECORDS - HEALTHEAST (OUTPATIENT)
Dept: INTERNAL MEDICINE | Facility: CLINIC | Age: 66
End: 2021-06-23

## 2021-06-23 DIAGNOSIS — F90.9 ADHD (ATTENTION DEFICIT HYPERACTIVITY DISORDER): ICD-10-CM

## 2021-06-23 NOTE — TELEPHONE ENCOUNTER
Controlled Substance Refill Request  Medication Name:   Requested Prescriptions     Pending Prescriptions Disp Refills     dextroamphetamine-amphetamine (ADDERALL XR) 20 MG 24 hr capsule 60 capsule 0     Sig: Take 2 capsules (40 mg total) by mouth every morning.     Date Last Fill: 12/20/18  Pharmacy: [facility] ([place])      Submit electronically to pharmacy  Controlled Substance Agreement Date Scanned:   Encounter-Level CSA Scan Date - 04/10/2018:    Scan on 4/17/2018 11:37 AM (below)             Encounter-Level CSA Scan Date - 09/28/2017:    Scan on 10/2/2017  1:45 PM (below)             Encounter-Level CSA Scan Date - 07/25/2016:    Scan on 7/27/2016 10:41 AM: CONTROLLED SUBSTANCE 7/25/2016 (below)         Last office visit with prescriber/PCP: 10/11/2018 Alejandro Villalpando MD OR same dept: 10/11/2018 Alejandro Villalpando MD OR same specialty: 10/11/2018 Aleajndro Villalpando MD  Last physical: Visit date not found Last MTM visit: Visit date not found

## 2021-06-23 NOTE — TELEPHONE ENCOUNTER
Refill Approved    Rx renewed per Medication Renewal Policy. Medication was last renewed on 7/6/18.    Cherry Baig, [facility] Triage/Med Refill 2/4/2019     Requested Prescriptions   Pending Prescriptions Disp Refills     losartan-hydrochlorothiazide (HYZAAR) 100-25 mg per tablet [Pharmacy Med Name: LOSARTAN/HCTZ 100/25MG TABLETS] 90 tablet 0     Sig: TAKE 1 TABLET BY MOUTH EVERY DAY    Diuretics/Combination Diuretics Refill Protocol  Passed - 2/1/2019  9:58 AM       Passed - Visit with PCP or prescribing provider visit in past 12 months    Last office visit with prescriber/PCP: 10/11/2018 Alejandro Villalpando MD OR same dept: 10/11/2018 Alejandro Villalpando MD OR same specialty: 10/11/2018 Alejandro Villalpando MD  Last physical: Visit date not found Last MTM visit: Visit date not found   Next visit within 3 mo: Visit date not found  Next physical within 3 mo: Visit date not found  Prescriber OR PCP: Alejandro Villalpando MD  Last diagnosis associated with med order: 1. Hypertension  - losartan-hydrochlorothiazide (HYZAAR) 100-25 mg per tablet [Pharmacy Med Name: LOSARTAN/HCTZ 100/25MG TABLETS]; TAKE 1 TABLET BY MOUTH EVERY DAY  Dispense: 90 tablet; Refill: 0    If protocol passes may refill for 12 months if within 3 months of last provider visit (or a total of 15 months).            Passed - Serum Potassium in past 12 months     Lab Results   Component Value Date    Potassium 3.6 10/11/2018            Passed - Serum Sodium in past 12 months     Lab Results   Component Value Date    Sodium 139 10/11/2018            Passed - Blood pressure on file in past 12 months    BP Readings from Last 1 Encounters:   10/11/18 130/82            Passed - Serum Creatinine in past 12 months     Creatinine   Date Value Ref Range Status   10/11/2018 1.20 0.70 - 1.30 mg/dL Final

## 2021-06-24 RX ORDER — DEXTROAMPHETAMINE SACCHARATE, AMPHETAMINE ASPARTATE MONOHYDRATE, DEXTROAMPHETAMINE SULFATE AND AMPHETAMINE SULFATE 5; 5; 5; 5 MG/1; MG/1; MG/1; MG/1
40 CAPSULE, EXTENDED RELEASE ORAL EVERY MORNING
Qty: 60 CAPSULE | Refills: 0 | Status: SHIPPED | OUTPATIENT
Start: 2021-06-24 | End: 2021-07-23

## 2021-06-24 NOTE — TELEPHONE ENCOUNTER
Medication: Adderall 20 mg  Last Date Filled 1/18/19   pulled: YES    Only PCP Prescribing? : NO  Taken as prescribed from physician notes? YES OV 10/11/18  ASSESSMENT:  1.  Attention deficit disorder: Les remains on Adderall XR 20 mg 2 tabs daily.  He has done well on this.  He will have a DOA-1 check today and had a new controlled substance agreement discussed and filled out.    CSA in last year: YES  Random Utox in last year: YES  (if any of the above answer NO - schedule with PCP)     Opioids + benzodiazepines? NO  (if the above answer YES - schedule with PCP every 6 months)     All responses suggest: Refilling prescription

## 2021-06-24 NOTE — TELEPHONE ENCOUNTER
Who is calling:  Patient   Reason for Call:  Calling to check on below prescription request. Reports he is out of medication. Please send a new prescription asap.  Date of last appointment with primary care: 10/11/18  Has the patient been recently seen:  No  Okay to leave a detailed message: Yes

## 2021-06-24 NOTE — TELEPHONE ENCOUNTER
Controlled Substance Refill Request  Medication Name:   Requested Prescriptions     Pending Prescriptions Disp Refills     dextroamphetamine-amphetamine (ADDERALL XR) 20 MG 24 hr capsule 60 capsule 0     Sig: Take 2 capsules (40 mg total) by mouth every morning.     Date Last Fill: 1/18/19  Pharmacy: [facility]      Submit electronically to pharmacy  Controlled Substance Agreement Date Scanned:   Encounter-Level CSA Scan Date - 04/10/2018:    Scan on 4/17/2018 11:37 AM (below)             Encounter-Level CSA Scan Date - 09/28/2017:    Scan on 10/2/2017  1:45 PM (below)             Encounter-Level CSA Scan Date - 07/25/2016:    Scan on 7/27/2016 10:41 AM: CONTROLLED SUBSTANCE 7/25/2016 (below)         Last office visit with prescriber/PCP: 10/11/2018 Alejandro Villalpando MD OR same dept: 10/11/2018 Alejandro Villalpando MD OR same specialty: 10/11/2018 Alejandro Villalpando MD  Last physical: Visit date not found Last MTM visit: Visit date not found

## 2021-06-24 NOTE — TELEPHONE ENCOUNTER
Controlled Substance Refill Request  Medication Name:   Requested Prescriptions     Pending Prescriptions Disp Refills     dextroamphetamine-amphetamine (ADDERALL XR) 20 MG 24 hr capsule 60 capsule 0     Sig: Take 2 capsules (40 mg total) by mouth every morning.     Date Last Fill: 2/18  [facility]      Submit electronically to pharmacy  Controlled Substance Agreement Date Scanned:   Encounter-Level CSA Scan Date - 04/10/2018:    Scan on 4/17/2018 11:37 AM (below)             Encounter-Level CSA Scan Date - 09/28/2017:    Scan on 10/2/2017  1:45 PM (below)             Encounter-Level CSA Scan Date - 07/25/2016:    Scan on 7/27/2016 10:41 AM: CONTROLLED SUBSTANCE 7/25/2016 (below)         Last office visit with prescriber/PCP: 10/11/2018 Alejandro Villalpando MD OR same dept: 10/11/2018 Alejandro Villalpando MD OR same specialty: 10/11/2018 Alejandro Villalpando MD  Last physical: Visit date not found Last MTM visit: Visit date not found

## 2021-06-24 NOTE — TELEPHONE ENCOUNTER
RN resent current quetiapine Rx dated 10/11/18 since original was class: No print, it didn't e-scribe.  Rosemarie Barrios RN, [facility] Refill/Triage, 2/25/2019 7:46 AM

## 2021-06-25 NOTE — TELEPHONE ENCOUNTER
Could try over-the-counter Benadryl 25 to 50 mg at bedtime.  He needs to monitor to make sure there are no emotional issues with going off of Seroquel.

## 2021-06-25 NOTE — TELEPHONE ENCOUNTER
Medication: Adderall 20mg  Last Date Filled 2/19/19   pulled: YES    Only PCP Prescribing? : YES  Taken as prescribed from physician notes? YES    CSA in last year: YES  Random Utox in last year: YES  (if any of the above answer NO - schedule with PCP)     Opioids + benzodiazepines? NO  (if the above answer YES - schedule with PCP every 6 months)     All responses suggest: Refilling prescription

## 2021-06-25 NOTE — TELEPHONE ENCOUNTER
Medication Problem:  Seroquel 50 mg is causing problems. Seroquel does help him sleep, but it makes him crave foods.   He states his legs shake a bit when he is trying to fall asleep.       Prescriber:  Dr. Villalpando    What is the concern?   Patient is requesting a different sleeping medication. He would like to use Benadryl.  What dose would provider recommend?

## 2021-06-26 NOTE — TELEPHONE ENCOUNTER
Reason for Call:  Medication or medication refill:    dextroamphetamine-amphetamine (ADDERALL XR) 20 MG 24 hr capsule    Do you use a [facility]?  Name of the pharmacy and phone number for the current request: [facility] [place]    Name of the medication requested: dextroamphetamine-amphetamine (ADDERALL XR) 20 MG 24 hr capsule    Other request:     Can we leave a detailed message on this number? No call back needed    Phone number patient can be reached at: Home number on file [phone] (home)    Best Time:     Call taken on 6/23/2021 at 10:21 AM by Jazlyn Funes

## 2021-06-27 ENCOUNTER — HEALTH MAINTENANCE LETTER (OUTPATIENT)
Age: 66
End: 2021-06-27

## 2021-07-04 NOTE — TELEPHONE ENCOUNTER
Telephone Encounter by Yane Pa CMA at 6/23/2021 10:28 AM     Author: Yane Pa CMA Service: -- Author Type: Certified Medical Assistant    Filed: 6/23/2021 10:32 AM Encounter Date: 6/23/2021 Status: Signed    : Yane Pa CMA (Certified Medical Assistant)       Medication: Adderall   Last Date Filled 5/25/21   pulled: YES    Only PCP Prescribing? : YES  Taken as prescribed from physician notes? YES    CSA in last year: NO  Random Utox in last year: NO  (if any of the above answer NO - schedule with PCP)     Opioids + benzodiazepines? NO  (if the above answer YES - schedule with PCP every 6 months)     Is patient on the Executive Review Team? NO      All responses suggest: Scheduling with PCP for further intervention

## 2021-07-04 NOTE — TELEPHONE ENCOUNTER
Telephone Encounter by Quynh Carty LPN at 5/25/2021  1:47 PM     Author: Quynh Carty LPN Service: -- Author Type: Licensed Nurse    Filed: 5/25/2021  1:54 PM Encounter Date: 5/25/2021 Status: Signed    : Quynh Carty LPN (Licensed Nurse)       Medication:Adderall XR 20 mg  Last Date Filled 4/27/21   pulled: YES        Only PCP Prescribing? : YES  Taken as prescribed from physician notes? YES    CSA in last year: NO  Random Utox in last year: NO  Opioids + benzodiazepines? NO     Use of Adderall was last discussed with Dr. Villalpando on 4/26/21.  No CSA or Utox done.    Is patient on the Executive Review Team? NO      All responses suggest: Provider's discretion.

## 2021-07-23 DIAGNOSIS — F90.9 ADHD (ATTENTION DEFICIT HYPERACTIVITY DISORDER): ICD-10-CM

## 2021-07-23 DIAGNOSIS — F98.8 ATTENTION DEFICIT DISORDER (ADD) WITHOUT HYPERACTIVITY: Primary | ICD-10-CM

## 2021-07-23 RX ORDER — DEXTROAMPHETAMINE SACCHARATE, AMPHETAMINE ASPARTATE MONOHYDRATE, DEXTROAMPHETAMINE SULFATE AND AMPHETAMINE SULFATE 5; 5; 5; 5 MG/1; MG/1; MG/1; MG/1
40 CAPSULE, EXTENDED RELEASE ORAL EVERY MORNING
Qty: 60 CAPSULE | Refills: 0 | Status: SHIPPED | OUTPATIENT
Start: 2021-07-23 | End: 2021-08-30

## 2021-07-23 NOTE — TELEPHONE ENCOUNTER
Medication: Adderall  Last Date Filled 6/24/21   pulled: YES         Only PCP Prescribing? : YES  Taken as prescribed from physician notes? YES    CSA in last year: NO  Random Utox in last year: NO  (if any of the above answer NO - schedule with PCP)     Opioids + benzodiazepines? NO  (if the above answer YES - schedule with PCP every 6 months)     Is patient on the Executive Review Team? no      All responses suggest: Refilling prescription

## 2021-07-23 NOTE — TELEPHONE ENCOUNTER
Reason for Call:  Medication or medication refill:    Do you use a [facility]?  Name of the pharmacy and phone number for the current request:  [facility]    Name of the medication requested:   dextroamphetamine-amphetamine (ADDERALL XR) 20 MG 24 hr capsule 60 capsule 0 6/24/2021  No   Sig - Route: [DEXTROAMPHETAMINE-AMPHETAMINE (ADDERALL XR) 20 MG 24 HR CAPSULE] Take 2 capsules (40 mg total) by mouth every morning. - Oral         Other request: na    Can we leave a detailed message on this number? YES    Phone number patient can be reached at: Cell number on file:    Telephone Information:   Mobile [phone]       Best Time: any    Call taken on 7/23/2021 at 11:20 AM by Pam J. Behr

## 2021-08-24 DIAGNOSIS — F90.9 ADHD (ATTENTION DEFICIT HYPERACTIVITY DISORDER): ICD-10-CM

## 2021-08-24 DIAGNOSIS — F98.8 ATTENTION DEFICIT DISORDER (ADD) WITHOUT HYPERACTIVITY: Primary | ICD-10-CM

## 2021-08-24 RX ORDER — DEXTROAMPHETAMINE SACCHARATE, AMPHETAMINE ASPARTATE MONOHYDRATE, DEXTROAMPHETAMINE SULFATE AND AMPHETAMINE SULFATE 5; 5; 5; 5 MG/1; MG/1; MG/1; MG/1
40 CAPSULE, EXTENDED RELEASE ORAL EVERY MORNING
Qty: 60 CAPSULE | Refills: 0 | Status: SHIPPED | OUTPATIENT
Start: 2021-08-24 | End: 2021-09-23

## 2021-08-24 NOTE — TELEPHONE ENCOUNTER
Reason for Call:  Medication or medication refill:    Do you use a [facility]?  Name of the pharmacy and phone number for the current request[facility]    Name of the medication requested: adderal xr    Other request: [facility] [address]     Can we leave a detailed message on this number? YES    Phone number patient can be reached at: Home number on file [phone] (home)    Best Time:     Call taken on 8/24/2021 at 9:53 AM by Shaila Simpson

## 2021-08-24 NOTE — TELEPHONE ENCOUNTER
Medication: Adderall  Last Date Filled 7/24/21   pulled: YES         Only PCP Prescribing? : YES  Taken as prescribed from physician notes? YES    CSA in last year: NO  Random Utox in last year: NO  (if any of the above answer NO - schedule with PCP)     Opioids + benzodiazepines? NO  (if the above answer YES - schedule with PCP every 6 months)     Is patient on the Executive Review Team? no      All responses suggest: Refilling prescription

## 2021-08-30 ENCOUNTER — OFFICE VISIT (OUTPATIENT)
Dept: INTERNAL MEDICINE | Facility: CLINIC | Age: 66
End: 2021-08-30
Payer: COMMERCIAL

## 2021-08-30 VITALS
HEIGHT: 70 IN | SYSTOLIC BLOOD PRESSURE: 124 MMHG | WEIGHT: 175.1 LBS | DIASTOLIC BLOOD PRESSURE: 80 MMHG | OXYGEN SATURATION: 98 % | HEART RATE: 90 BPM | BODY MASS INDEX: 25.07 KG/M2

## 2021-08-30 DIAGNOSIS — M15.9 GENERALIZED OSTEOARTHRITIS OF MULTIPLE SITES: ICD-10-CM

## 2021-08-30 DIAGNOSIS — R73.01 IMPAIRED FASTING GLUCOSE: ICD-10-CM

## 2021-08-30 DIAGNOSIS — F30.9 BIPOLAR I DISORDER, SINGLE MANIC EPISODE (H): ICD-10-CM

## 2021-08-30 DIAGNOSIS — N18.31 STAGE 3A CHRONIC KIDNEY DISEASE (H): ICD-10-CM

## 2021-08-30 DIAGNOSIS — F98.8 ATTENTION DEFICIT DISORDER (ADD) WITHOUT HYPERACTIVITY: ICD-10-CM

## 2021-08-30 DIAGNOSIS — Z00.00 MEDICARE ANNUAL WELLNESS VISIT, SUBSEQUENT: Primary | ICD-10-CM

## 2021-08-30 DIAGNOSIS — Z51.81 ENCOUNTER FOR THERAPEUTIC DRUG MONITORING: ICD-10-CM

## 2021-08-30 DIAGNOSIS — E78.00 HYPERCHOLESTEREMIA: ICD-10-CM

## 2021-08-30 DIAGNOSIS — R73.03 PREDIABETES: ICD-10-CM

## 2021-08-30 DIAGNOSIS — B18.2 CHRONIC HEPATITIS C WITHOUT HEPATIC COMA (H): ICD-10-CM

## 2021-08-30 DIAGNOSIS — F17.209 TOBACCO USE DISORDER, CONTINUOUS: ICD-10-CM

## 2021-08-30 DIAGNOSIS — Z12.5 SCREENING FOR PROSTATE CANCER: ICD-10-CM

## 2021-08-30 DIAGNOSIS — I10 ESSENTIAL HYPERTENSION: ICD-10-CM

## 2021-08-30 LAB
ALBUMIN SERPL-MCNC: 4.2 G/DL (ref 3.5–5)
ALBUMIN UR-MCNC: NEGATIVE MG/DL
ALP SERPL-CCNC: 88 U/L (ref 45–120)
ALT SERPL W P-5'-P-CCNC: 14 U/L (ref 0–45)
AMPHETAMINES UR QL SCN: ABNORMAL
ANION GAP SERPL CALCULATED.3IONS-SCNC: 11 MMOL/L (ref 5–18)
APPEARANCE UR: CLEAR
AST SERPL W P-5'-P-CCNC: 16 U/L (ref 0–40)
BACTERIA #/AREA URNS HPF: ABNORMAL /HPF
BARBITURATES UR QL: ABNORMAL
BENZODIAZ UR QL: ABNORMAL
BILIRUB SERPL-MCNC: 0.5 MG/DL (ref 0–1)
BILIRUB UR QL STRIP: NEGATIVE
BUN SERPL-MCNC: 24 MG/DL (ref 8–22)
CALCIUM SERPL-MCNC: 10.6 MG/DL (ref 8.5–10.5)
CANNABINOIDS UR QL SCN: ABNORMAL
CHLORIDE BLD-SCNC: 104 MMOL/L (ref 98–107)
CHOLEST SERPL-MCNC: 236 MG/DL
CO2 SERPL-SCNC: 28 MMOL/L (ref 22–31)
COCAINE UR QL: ABNORMAL
COLOR UR AUTO: YELLOW
CREAT SERPL-MCNC: 1.46 MG/DL (ref 0.7–1.3)
CREAT UR-MCNC: 209 MG/DL
ERYTHROCYTE [DISTWIDTH] IN BLOOD BY AUTOMATED COUNT: 12.4 % (ref 10–15)
FASTING STATUS PATIENT QL REPORTED: NO
GFR SERPL CREATININE-BSD FRML MDRD: 49 ML/MIN/1.73M2
GLUCOSE BLD-MCNC: 135 MG/DL (ref 70–125)
GLUCOSE UR STRIP-MCNC: NEGATIVE MG/DL
HBA1C MFR BLD: 5.7 % (ref 0–5.6)
HCT VFR BLD AUTO: 43.3 % (ref 40–53)
HDLC SERPL-MCNC: 51 MG/DL
HGB BLD-MCNC: 15.1 G/DL (ref 13.3–17.7)
HGB UR QL STRIP: NEGATIVE
KETONES UR STRIP-MCNC: NEGATIVE MG/DL
LDLC SERPL CALC-MCNC: 157 MG/DL
LEUKOCYTE ESTERASE UR QL STRIP: ABNORMAL
MCH RBC QN AUTO: 30.8 PG (ref 26.5–33)
MCHC RBC AUTO-ENTMCNC: 34.9 G/DL (ref 31.5–36.5)
MCV RBC AUTO: 88 FL (ref 78–100)
NITRATE UR QL: POSITIVE
OPIATES UR QL SCN: ABNORMAL
OXYCODONE UR QL: ABNORMAL
PCP UR QL SCN: ABNORMAL
PH UR STRIP: 5.5 [PH] (ref 5–8)
PLATELET # BLD AUTO: 225 10E3/UL (ref 150–450)
POTASSIUM BLD-SCNC: 4.1 MMOL/L (ref 3.5–5)
PROT SERPL-MCNC: 7.8 G/DL (ref 6–8)
PSA SERPL-MCNC: 0.59 UG/L (ref 0–4.5)
RBC # BLD AUTO: 4.9 10E6/UL (ref 4.4–5.9)
RBC #/AREA URNS AUTO: ABNORMAL /HPF
SODIUM SERPL-SCNC: 143 MMOL/L (ref 136–145)
SP GR UR STRIP: 1.02 (ref 1–1.03)
SQUAMOUS #/AREA URNS AUTO: ABNORMAL /LPF
TRIGL SERPL-MCNC: 138 MG/DL
UROBILINOGEN UR STRIP-ACNC: 0.2 E.U./DL
WBC # BLD AUTO: 6.8 10E3/UL (ref 4–11)
WBC #/AREA URNS AUTO: ABNORMAL /HPF

## 2021-08-30 PROCEDURE — 80061 LIPID PANEL: CPT | Performed by: INTERNAL MEDICINE

## 2021-08-30 PROCEDURE — G0103 PSA SCREENING: HCPCS | Performed by: INTERNAL MEDICINE

## 2021-08-30 PROCEDURE — 86803 HEPATITIS C AB TEST: CPT | Performed by: INTERNAL MEDICINE

## 2021-08-30 PROCEDURE — 80307 DRUG TEST PRSMV CHEM ANLYZR: CPT | Performed by: INTERNAL MEDICINE

## 2021-08-30 PROCEDURE — 85027 COMPLETE CBC AUTOMATED: CPT | Performed by: INTERNAL MEDICINE

## 2021-08-30 PROCEDURE — 87522 HEPATITIS C REVRS TRNSCRPJ: CPT | Performed by: INTERNAL MEDICINE

## 2021-08-30 PROCEDURE — 83036 HEMOGLOBIN GLYCOSYLATED A1C: CPT | Performed by: INTERNAL MEDICINE

## 2021-08-30 PROCEDURE — G0439 PPPS, SUBSEQ VISIT: HCPCS | Performed by: INTERNAL MEDICINE

## 2021-08-30 PROCEDURE — 36415 COLL VENOUS BLD VENIPUNCTURE: CPT

## 2021-08-30 PROCEDURE — 87186 SC STD MICRODIL/AGAR DIL: CPT | Performed by: INTERNAL MEDICINE

## 2021-08-30 PROCEDURE — 81001 URINALYSIS AUTO W/SCOPE: CPT | Performed by: INTERNAL MEDICINE

## 2021-08-30 PROCEDURE — 87086 URINE CULTURE/COLONY COUNT: CPT | Performed by: INTERNAL MEDICINE

## 2021-08-30 PROCEDURE — 80053 COMPREHEN METABOLIC PANEL: CPT | Performed by: INTERNAL MEDICINE

## 2021-08-30 RX ORDER — IBUPROFEN 600 MG/1
600 TABLET, FILM COATED ORAL EVERY 8 HOURS PRN
Qty: 90 TABLET | Refills: 3 | Status: SHIPPED | OUTPATIENT
Start: 2021-08-30 | End: 2022-09-22

## 2021-08-30 ASSESSMENT — MIFFLIN-ST. JEOR: SCORE: 1572.56

## 2021-08-30 ASSESSMENT — ACTIVITIES OF DAILY LIVING (ADL): CURRENT_FUNCTION: NO ASSISTANCE NEEDED

## 2021-08-30 NOTE — PROGRESS NOTES
"SUBJECTIVE:   Socrates Paez is a 66 year old male who presents for Preventive Visit... Less is , but keeps in close contact with his ex-wife.  He has 2 daughters.  He rides a bike both for transportation and exercise.  No cognitive deficits are noted.  His health risk assessment was reviewed.  Health maintenance habits are good aside from tobacco use.  Does not have a healthcare directive.    He has a history of attention deficit disorder treated with Adderall.  He feels it has been of substantial benefit with concentration.  He denies adverse effects from the medication.  A new controlled substance agreement was discussed and filled out.  He will have a tox screen sent    He remains on losartan-HCTZ 100/25 mg daily, amlodipine 5 mg daily, and metoprolol succinate 50 mg daily for hypertension.  Blood pressure is good on this regimen    He has a previous diagnosis of bipolar disorder.  He is on Seroquel 50 mg at bedtime.  He feels it is of benefit    He has a past history of prediabetes.  Hemoglobin A1c will be checked.    He is now smoking 6 -7 cigarettes daily.  He is working on smoking cessation      Patient has been advised of split billing requirements and indicates understanding: Yes   Are you in the first 12 months of your Medicare coverage?  No    Healthy Habits:     In general, how would you rate your overall health?  Good    Frequency of exercise:  4-5 days/week    Duration of exercise:  45-60 minutes    Do you usually eat at least 4 servings of fruit and vegetables a day, include whole grains    & fiber and avoid regularly eating high fat or \"junk\" foods?  Yes    Medication side effects:  None    Ability to successfully perform activities of daily living:  No assistance needed    Home Safety:  No safety concerns identified    Hearing Impairment:  No hearing concerns    In the past 6 months, have you been bothered by leaking of urine?  No    In general, how would you rate your overall mental or " emotional health?  Excellent      PHQ-2 Total Score: 0    Additional concerns today:  No    Do you feel safe in your environment? Yes    Have you ever done Advance Care Planning? (For example, a Health Directive, POLST, or a discussion with a medical provider or your loved ones about your wishes): No.  Will discuss       Fall risk  Fallen 2 or more times in the past year?: No  Any fall with injury in the past year?: No  click delete button to remove this line now  Cognitive Screening   1) Repeat 3 items (Leader, Season, Table)    2) Clock draw: NORMAL  3) 3 item recall: Recalls 3 objects  Results: 3 items recalled: COGNITIVE IMPAIRMENT LESS LIKELY    Mini-CogTM Copyright S Shanna. Licensed by the author for use in [facility]; reprinted with permission (corrie@[facility]). All rights reserved.      Do you have sleep apnea, excessive snoring or daytime drowsiness?: no    Reviewed and updated as needed this visit by clinical staff  Tobacco  Allergies  Meds              Reviewed and updated as needed this visit by Provider                Social History     Tobacco Use     Smoking status: Current Every Day Smoker     Packs/day: 0.25     Types: Cigarettes     Smokeless tobacco: Never Used     Tobacco comment: 5 cigarettes a day   Substance Use Topics     Alcohol use: No         Alcohol Use 8/30/2021   Prescreen: >3 drinks/day or >7 drinks/week? No             Current providers sharing in care for this patient include:   Patient Care Team:  Alejandro Villalpando MD as PCP - General  Alejandro Villalpando MD as Assigned PCP    The following health maintenance items are reviewed in Epic and correct as of today:  Health Maintenance Due   Topic Date Due     ANNUAL REVIEW OF HM ORDERS  Never done     ADVANCE CARE PLANNING  Never done     COVID-19 Vaccine (1) Never done     HEPATITIS B IMMUNIZATION (3 of 3 - Hep B Twinrix risk 3-dose series) 07/07/2008     ZOSTER IMMUNIZATION (2 of 2) 09/19/2016     MEDICARE ANNUAL WELLNESS  "VISIT  Never done     AORTIC ANEURYSM SCREENING (SYSTEM ASSIGNED)  Never done     Pneumococcal Vaccine: 65+ Years (2 of 2 - PPSV23) 07/25/2021     INFLUENZA VACCINE (1) 09/01/2021     Lab work is in process          Review of Systems  CONSTITUTIONAL: NEGATIVE for fever, chills, change in weight  INTEGUMENTARY/SKIN: NEGATIVE for worrisome rashes, moles or lesions  EYES: NEGATIVE for vision changes or irritation  ENT/MOUTH: NEGATIVE for ear, mouth and throat problems  RESP: NEGATIVE for significant cough or SOB  BREAST: NEGATIVE for masses, tenderness or discharge  CV: NEGATIVE for chest pain, palpitations or peripheral edema  GI: NEGATIVE for nausea, abdominal pain, heartburn, or change in bowel habits  : NEGATIVE for frequency, dysuria, or hematuria  MUSCULOSKELETAL: NEGATIVE for significant arthralgias or myalgia.  Does note intermittent low back stiffness  NEURO: NEGATIVE for weakness, dizziness or paresthesias  ENDOCRINE: NEGATIVE for temperature intolerance, skin/hair changes  HEME: NEGATIVE for bleeding problems  PSYCHIATRIC: NEGATIVE for changes in mood or affect    OBJECTIVE:   There were no vitals taken for this visit. Estimated body mass index is 27.32 kg/m  as calculated from the following:    Height as of 1/31/20: 1.753 m (5' 9\").    Weight as of 1/31/20: 83.9 kg (185 lb).  Physical Exam   /80 (BP Location: Left arm, Patient Position: Sitting, Cuff Size: Adult Small)   Pulse 90   Ht 1.765 m (5' 9.5\")   Wt 79.4 kg (175 lb 1.6 oz)   SpO2 98%   BMI 25.49 kg/m      General Appearance:  Alert, cooperative, no distress, appears stated age   Head:  Normocephalic, without obvious abnormality, atraumatic   Eyes:  PERRL, conjunctiva/corneas clear, EOM's intact   Ears:  Normal TM's and external ear canals, both ears   Nose: Nares normal, septum midline, mucosa normal, no drainage   Throat:  Moderate periodontal disease.  Posterior pharynx clear   Neck: Supple, symmetrical, trachea midline, no " adenopathy, thyroid: not enlarged, symmetric, no tenderness/mass/nodules, no carotid bruit or JVD   Back:   Symmetric, no curvature, ROM normal, no CVA tenderness   Lungs:   Clear to auscultation bilaterally, respirations unlabored   Chest Wall:  No tenderness or deformity   Heart:  Regular rate and rhythm, S1, S2 normal, no murmur, rub or gallop   Abdomen:   Soft, non-tender, bowel sounds active all four quadrants,  no masses, no organomegaly   Genitalia:   Circumcised.  No penile lesions or testicular masses.  No inguinal hernias   Rectal:   External hemorrhoids noted.  Prostate without suspicious nodules   Extremities: Extremities normal, atraumatic, no cyanosis or edema   Skin: Skin color, texture, turgor normal, no rashes or lesions   Lymph nodes: Cervical and supraclavicular normal   Neurologic: No dysarthria or aphasia.  Cranial nerves, motor or sensory exams intact with symmetric DTRs             ASSESSMENT / PLAN:   Socrates was seen today for physical.    Diagnoses and all orders for this visit:    Medicare annual wellness visit, subsequent  Less is , but still has close contact with his wife.  He uses a bicycle both for exercise and transportation.  He is smoking 6 to 7 cigarettes daily.  He is a Gnosticist.  No cognitive deficits are noted.  His health risk assessment was reviewed.  He is independent in activities of daily living.  He does not have a healthcare directive.    Socrates has not had Covid vaccination.  He was strongly encouraged to consider this, but declines.  Flu shot for the fall, and Shingrix also would be recommended  Hypercholesteremia  Fasting lipid cascade will be checked  -     Lipid panel reflex to direct LDL Fasting; Future  -     Lipid panel reflex to direct LDL Fasting    Impaired fasting glucose  He has had a elevated fasting blood sugar and hemoglobin A1c in the past.  This will be rechecked today  -     A1C FUTURE 3mo; Future  -     A1C FUTURE 3mo    Essential  "hypertension  Controlled on current regimen.  He denies adverse effects from current meds  -     UA Macro with Reflex to Micro and Culture - lab collect; Future  -     Comprehensive metabolic panel; Future  -     UA Macro with Reflex to Micro and Culture - lab collect  -     Comprehensive metabolic panel    Tobacco use disorder, continuous  Smoking cessation was encouraged.  Chronic hepatitis C without hepatic coma (H)  Liver enzymes will be rechecked.  Hepatitis C antibodies will be checked  -     Comprehensive metabolic panel; Future  -     CBC with platelets; Future  -     Comprehensive metabolic panel  -     CBC with platelets    Attention deficit disorder (ADD) without hyperactivity  He feels Adderall has been of benefit.  This will be continued.  A new controlled substance agreement was discussed and filled out  Bipolar Disorder  He is on Seroquel at at bedtime and feels it helps  Screening for prostate cancer  PSA will be checked  -     PSA, screen; Future  -     PSA, screen      Generalized osteoarthritis of multiple sites  He requests a refill of ibuprofen.  This is used \"as needed \"rather than chronically  -     ibuprofen (ADVIL/MOTRIN) 600 MG tablet; Take 1 tablet (600 mg) by mouth every 8 hours as needed for moderate pain    Encounter for therapeutic drug monitoring  -     Urine Drugs of Abuse Screen Panel 1 - Drug Screen (Full); Future  -     Urine Drugs of Abuse Screen Panel 1 - Drug Screen (Full)    Other orders  -     REVIEW OF HEALTH MAINTENANCE PROTOCOL ORDERS        Patient has been advised of split billing requirements and indicates understanding: Yes  COUNSELING:  Reviewed preventive health counseling, as reflected in patient instructions       Colon cancer screening    Estimated body mass index is 27.32 kg/m  as calculated from the following:    Height as of 1/31/20: 1.753 m (5' 9\").    Weight as of 1/31/20: 83.9 kg (185 lb).        He reports that he has been smoking cigarettes. He has been " smoking about 0.25 packs per day. He has never used smokeless tobacco.  Tobacco Cessation Action Plan:   Has used Nicorette in the past.  He is working on smoking cessation      Appropriate preventive services were discussed with this patient, including applicable screening as appropriate for cardiovascular disease, diabetes, osteopenia/osteoporosis, and glaucoma.  As appropriate for age/gender, discussed screening for colorectal cancer, prostate cancer, breast cancer, and cervical cancer. Checklist reviewing preventive services available has been given to the patient.    Reviewed patients plan of care and provided an AVS. The Basic Care Plan (routine screening as documented in Health Maintenance) for Les meets the Care Plan requirement. This Care Plan has been established and reviewed with the Patient.    Counseling Resources:  ATP IV Guidelines  Pooled Cohorts Equation Calculator  Breast Cancer Risk Calculator  Breast Cancer: Medication to Reduce Risk  FRAX Risk Assessment  ICSI Preventive Guidelines  Dietary Guidelines for Americans, 2010  USDA's MyPlate  ASA Prophylaxis  Lung CA Screening    Alejandro Villalpando MD  [facility]    Identified Health Risks:

## 2021-08-30 NOTE — PATIENT INSTRUCTIONS
1.  Covid vaccine advised.    2.  Shingrix vaccine advised.    3.  Flu vaccine advised.    [facility].  [facility]    5. I will notify you of test results.    6.  See in one year or as needed for annual wellness visit.  6 months for follow-up of attention deficit.    7.  Tinea current medications

## 2021-08-30 NOTE — LETTER
[facility]  08/30/21  Patient: Socrates Paez  YOB: 1955  Medical Record Number: [id number]                                                                                  Non-Opioid Controlled Substance Agreement    This is an agreement between you and your provider regarding safe and appropriate use of controlled substances prescribed by your care team. Controlled substances are?medicines that can cause physical and mental dependence (abuse).     There are strict laws about having and using these medicines. We here at [facility] are  committed to working with you in your efforts to get better. To support you in this work, we'll help you schedule regular office appointments for medicine refills. If we must cancel or change your appointment for any reason, we'll make sure you have enough medicine to last until your next appointment.     As a Provider, I will:     Listen carefully to your concerns while treating you with respect.     Recommend a treatment plan that I believe is in your best interest and may involve therapies other than medicine.      Talk with you often about the possible benefits and the risk of harm of any medicine that we prescribe for you.    Assess the safety of this medicine and check how well it works.      Provide a plan on how to taper (discontinue or go off) using this medicine if the decision is made to stop its use.      ::  As a Patient, I understand controlled substances:       Are prescribed by my care provider to help me function or work and manage my condition(s).?    Are strong medicines and can cause serious side effects.       Need to be taken exactly as prescribed.?Combining controlled substances with certain medicines or chemicals (such as illegal drugs, alcohol, sedatives, sleeping pills, and benzodiazepines) can be dangerous or even fatal.? If I stop taking my medicines suddenly, I may have severe withdrawal symptoms.     The risks,  benefits, and side effects of these medicine(s) were explained to me. I agree that:    1. I will take part in other treatments as advised by my care team. This may be psychiatry or counseling, physical therapy, behavioral therapy, group treatment or a referral to specialist.    2. I will keep all my appointments and understand this is part of the monitoring of controlled substances.?My care team may require an office visit for EVERY controlled substance refill. If I miss appointments or don t follow instructions, my care team may stop my medicine    3. I will take my medicines as prescribed. I will not change the dose or schedule unless my care team tells me to. There will be no refills if I run out early.      4. I may be asked to come to the clinic and complete a urine drug test or complete a pill count. If I don t give a urine sample or participate in a pill count, the care team may stop my medicine.    5. I will only receive controlled substance prescriptions from this clinic. If I am treated by another provider, I will tell them that I am taking controlled substances and that I have a treatment agreement with this provider. I will inform my [facility] care team within one business day if I am given a prescription for any controlled substance by another healthcare provider. My [facility] care team can contact other providers and pharmacists about my use of any medicines.    6. It is up to me to make sure that I don't run out of my medicines on weekends or holidays.?If my care team is willing to refill my prescription without a visit, I must request refills only during office hours. Refills may take up to 3 business days to process. I will use one pharmacy to fill all my controlled substance prescriptions. I will notify the clinic about any changes to my insurance or medicine availability.    7. I am responsible for my prescriptions. If the medicine/prescription is lost, stolen or destroyed, it will  not be replaced.?I also agree not to share controlled substance medicines with anyone.     8. I am aware I should not use any illegal or recreational drugs. I agree not to drink alcohol unless my care team says I can.     9. If I enroll in the [place] Medical Cannabis program, I will tell my care team before my next refill.    10. I will tell my care team right away if I become pregnant, have a new medical problem treated outside of my regular clinic, or have a change in my medicines.     11. I understand that this medicine can affect my thinking, judgment and reaction time.? Alcohol and drugs affect the brain and body, which can affect the safety of my driving. Being under the influence of alcohol or drugs can affect my decision-making, behaviors, personal safety and the safety of others. Driving while impaired (DWI) can occur if a person is driving, operating or in physical control of a car, motorcycle, boat, snowmobile, ATV, motorbike, off-road vehicle or any other motor vehicle ([place] Statute 169A.20). I understand the risk if I choose to drive or operate any vehicle or machinery.    I understand that if I do not follow any of the conditions above, my prescriptions or treatment may be stopped or changed.   I agree that my provider, clinic care team and pharmacy may work with any city, state or federal law enforcement agency that investigates the misuse, sale or other diversion of my controlled medicine. I will allow my provider to discuss my care with, or share a copy of, this agreement with any other treating provider, pharmacy or emergency room where I receive care.     I have read this agreement and have asked questions about anything I did not understand.    ________________________________________________________  Patient Signature - Les N Paez     ___________________                   Date     ________________________________________________________  Provider Signature - Alejandro Villalpando MD        ___________________                   Date     ________________________________________________________  Witness Signature (required if provider not present while patient signing)          ___________________                   Date

## 2021-08-31 LAB — HCV AB SERPL QL IA: POSITIVE

## 2021-09-01 LAB — BACTERIA UR CULT: ABNORMAL

## 2021-09-02 DIAGNOSIS — B18.2 CHRONIC HEPATITIS C WITHOUT HEPATIC COMA (H): ICD-10-CM

## 2021-09-02 DIAGNOSIS — N39.0 URINARY TRACT INFECTION WITHOUT HEMATURIA, SITE UNSPECIFIED: Primary | ICD-10-CM

## 2021-09-02 PROBLEM — N18.30 CHRONIC KIDNEY DISEASE, STAGE 3 (H): Status: ACTIVE | Noted: 2021-09-02

## 2021-09-02 RX ORDER — SULFAMETHOXAZOLE/TRIMETHOPRIM 800-160 MG
1 TABLET ORAL 2 TIMES DAILY
Qty: 10 TABLET | Refills: 0 | Status: SHIPPED | OUTPATIENT
Start: 2021-09-02 | End: 2021-09-07

## 2021-09-05 LAB — HCV RNA SERPL NAA+PROBE-ACNC: NOT DETECTED IU/ML

## 2021-09-23 DIAGNOSIS — F98.8 ATTENTION DEFICIT DISORDER (ADD) WITHOUT HYPERACTIVITY: ICD-10-CM

## 2021-09-23 RX ORDER — DEXTROAMPHETAMINE SACCHARATE, AMPHETAMINE ASPARTATE MONOHYDRATE, DEXTROAMPHETAMINE SULFATE AND AMPHETAMINE SULFATE 5; 5; 5; 5 MG/1; MG/1; MG/1; MG/1
40 CAPSULE, EXTENDED RELEASE ORAL EVERY MORNING
Qty: 60 CAPSULE | Refills: 0 | Status: SHIPPED | OUTPATIENT
Start: 2021-09-23 | End: 2021-10-28

## 2021-09-23 NOTE — TELEPHONE ENCOUNTER
Medication: Adderall 20 mg  Last Date Filled 8/24/21   pulled: PROVIDER TO PULL FROM Epic.     Only PCP Prescribing? PROVIDER TO PULL  FROM Epic.  Taken as prescribed from physician notes? YES    CSA in last year: NO  Random Utox in last year: NO  (if any of the above answer NO - schedule with PCP)     Opioids + benzodiazepines? NO  (if the above answer YES - schedule with PCP every 6 months)     Is patient on the Executive Review Team? no      All responses suggest: Refilling prescription

## 2021-09-23 NOTE — TELEPHONE ENCOUNTER
Reason for Call:  Medication or medication refill:    Do you use a [facility]?  Name of the pharmacy and phone number for the current request:    [facility]    Name of the medication requested:   Adderall 40mg 2 capsules daily #60      Other request: n/a    Can we leave a detailed message on this number? YES    Phone number patient can be reached at: Cell number on file:    Telephone Information:   Mobile [phone]       Best Time: anytime    Call taken on 9/23/2021 at 9:25 AM by Cecilia Ponce

## 2021-10-17 ENCOUNTER — HEALTH MAINTENANCE LETTER (OUTPATIENT)
Age: 66
End: 2021-10-17

## 2021-10-25 DIAGNOSIS — F98.8 ATTENTION DEFICIT DISORDER (ADD) WITHOUT HYPERACTIVITY: ICD-10-CM

## 2021-10-25 NOTE — TELEPHONE ENCOUNTER
Reason for Call:  Medication or medication refill:    Do you use a [facility]?  Name of the pharmacy and phone number for the current request:  [facility]    Name of the medication requested:   amphetamine-dextroamphetamine (ADDERALL XR) 20 MG 24 hr capsule 60 capsule 0 9/23/2021  No   Sig - Route: Take 2 capsules (40 mg) by mouth every morning - Oral         Other request: na    Can we leave a detailed message on this number? YES    Phone number patient can be reached at: Cell number on file:    Telephone Information:   Mobile [phone]       Best Time: any    Call taken on 10/25/2021 at 10:52 AM by Pam J. Behr

## 2021-10-27 ENCOUNTER — TELEPHONE (OUTPATIENT)
Dept: INTERNAL MEDICINE | Facility: CLINIC | Age: 66
End: 2021-10-27

## 2021-10-27 DIAGNOSIS — F98.8 ATTENTION DEFICIT DISORDER (ADD) WITHOUT HYPERACTIVITY: ICD-10-CM

## 2021-10-27 RX ORDER — DEXTROAMPHETAMINE SACCHARATE, AMPHETAMINE ASPARTATE MONOHYDRATE, DEXTROAMPHETAMINE SULFATE AND AMPHETAMINE SULFATE 5; 5; 5; 5 MG/1; MG/1; MG/1; MG/1
40 CAPSULE, EXTENDED RELEASE ORAL EVERY MORNING
Qty: 60 CAPSULE | Refills: 0 | Status: CANCELLED | OUTPATIENT
Start: 2021-10-27

## 2021-10-27 NOTE — TELEPHONE ENCOUNTER
Reason for Call:  Other call back    Detailed comments: Pt out of medication for:  Adderall    [facility]    Phone Number Patient can be reached at: Cell number on file:    Telephone Information:   Mobile [phone]       Best Time: anytime    Can we leave a detailed message on this number? YES    Call taken on 10/27/2021 at 10:24 AM by Cecilia Ponce

## 2021-10-27 NOTE — TELEPHONE ENCOUNTER
Medication: Adderall  Last Date Filled 9/23/21   pulled: PROVIDER TO PULL FROM Epic.     Only PCP Prescribing? PROVIDER TO PULL  FROM Epic.  Taken as prescribed from physician notes? YES    CSA in last year: YES  Random Utox in last year: YES-Positive for Cannabinoids  (if any of the above answer NO - schedule with PCP)     Opioids + benzodiazepines? NO  (if the above answer YES - schedule with PCP every 6 months)     Is patient on the Executive Review Team? no      All responses suggest: Routing to pcp to advise

## 2021-10-28 RX ORDER — DEXTROAMPHETAMINE SACCHARATE, AMPHETAMINE ASPARTATE MONOHYDRATE, DEXTROAMPHETAMINE SULFATE AND AMPHETAMINE SULFATE 5; 5; 5; 5 MG/1; MG/1; MG/1; MG/1
40 CAPSULE, EXTENDED RELEASE ORAL EVERY MORNING
Qty: 60 CAPSULE | Refills: 0 | Status: SHIPPED | OUTPATIENT
Start: 2021-10-28 | End: 2021-11-23

## 2021-11-22 ENCOUNTER — TELEPHONE (OUTPATIENT)
Dept: INTERNAL MEDICINE | Facility: CLINIC | Age: 66
End: 2021-11-22

## 2021-11-22 DIAGNOSIS — F98.8 ATTENTION DEFICIT DISORDER (ADD) WITHOUT HYPERACTIVITY: ICD-10-CM

## 2021-11-22 NOTE — TELEPHONE ENCOUNTER
Reason for Call:  Medication or medication refill:    Do you use a [facility]?  Name of the pharmacy and phone number for the current request[facility]    Name of the medication requested:   amphetamine-dextroamphetamine (ADDERALL XR) 20 MG 24 hr capsule 60 capsule 0 10/28/2021  No   Sig - Route: Take 2 capsules (40 mg) by mouth every morning - Oral         Other request: na    Can we leave a detailed message on this number? YES    Phone number patient can be reached at: Cell number on file:    Telephone Information:   Mobile [phone]       Best Time: any    Call taken on 11/22/2021 at 8:56 AM by Pam J. Behr

## 2021-11-23 RX ORDER — DEXTROAMPHETAMINE SACCHARATE, AMPHETAMINE ASPARTATE MONOHYDRATE, DEXTROAMPHETAMINE SULFATE AND AMPHETAMINE SULFATE 5; 5; 5; 5 MG/1; MG/1; MG/1; MG/1
40 CAPSULE, EXTENDED RELEASE ORAL EVERY MORNING
Qty: 60 CAPSULE | Refills: 0 | Status: SHIPPED | OUTPATIENT
Start: 2021-11-23 | End: 2021-12-27

## 2021-12-27 DIAGNOSIS — F98.8 ATTENTION DEFICIT DISORDER (ADD) WITHOUT HYPERACTIVITY: ICD-10-CM

## 2021-12-27 RX ORDER — DEXTROAMPHETAMINE SACCHARATE, AMPHETAMINE ASPARTATE MONOHYDRATE, DEXTROAMPHETAMINE SULFATE AND AMPHETAMINE SULFATE 5; 5; 5; 5 MG/1; MG/1; MG/1; MG/1
40 CAPSULE, EXTENDED RELEASE ORAL EVERY MORNING
Qty: 60 CAPSULE | Refills: 0 | Status: SHIPPED | OUTPATIENT
Start: 2021-12-27 | End: 2022-01-26

## 2021-12-27 NOTE — TELEPHONE ENCOUNTER
Reason for Call:  Medication or medication refill:    Do you use a [facility]?  Name of the pharmacy and phone number for the current request:    [facility]    Name of the medication requested:   Adderall 20mg capsule    Other request: n/a    Can we leave a detailed message on this number? YES    Phone number patient can be reached at: Cell number on file:    Telephone Information:   Mobile [phone]       Best Time: anytime    Call taken on 12/27/2021 at 9:33 AM by Cecilia Ponce

## 2021-12-27 NOTE — TELEPHONE ENCOUNTER
Medication: Adderall XR 20 mg  Last Date Filled 11/23/21   pulled: PROVIDER TO PULL FROM Epic.     Only PCP Prescribing? PROVIDER TO PULL  FROM [facility].    CSA in last year: YES  Random Utox in last year: YES  Opioids + benzodiazepines? NO     Use of Adderall was last discussed with Dr. Villalpando on 8/30/21.    Is patient on the Executive Review Team? No

## 2022-01-25 DIAGNOSIS — F98.8 ATTENTION DEFICIT DISORDER (ADD) WITHOUT HYPERACTIVITY: Primary | ICD-10-CM

## 2022-01-25 NOTE — TELEPHONE ENCOUNTER
Medication: Adderall    Last Date Filled 12/27/2021   pulled: PROVIDER TO PULL FROM Epic.   Only PCP Prescribing? PROVIDER TO PULL  FROM Epic.    Taken as prescribed from physician notes? YES    CSA in last year: YES  Random Utox in last year: YES  (if any of the above answer NO - schedule with PCP)     On opioids in addition to benzodiazepines? NO  (if the above answer YES - schedule with PCP every 6 months)     Is patient on the Executive Review Team? No    All responses suggest: Refilling prescription

## 2022-01-25 NOTE — TELEPHONE ENCOUNTER
Reason for Call:  Medication or medication refill:    Do you use a [facility]?  Name of the pharmacy and phone number for the current request:    [facility]    Name of the medication requested:   Adderall XR    Other request: n/a    Can we leave a detailed message on this number? YES    Phone number patient can be reached at: Cell number on file:    Telephone Information:   Mobile [phone]       Best Time: anytime    Call taken on 1/25/2022 at 10:34 AM by Cecilia Ponce

## 2022-01-26 RX ORDER — DEXTROAMPHETAMINE SACCHARATE, AMPHETAMINE ASPARTATE MONOHYDRATE, DEXTROAMPHETAMINE SULFATE AND AMPHETAMINE SULFATE 5; 5; 5; 5 MG/1; MG/1; MG/1; MG/1
40 CAPSULE, EXTENDED RELEASE ORAL EVERY MORNING
Qty: 60 CAPSULE | Refills: 0 | Status: SHIPPED | OUTPATIENT
Start: 2022-01-26 | End: 2022-02-23

## 2022-02-23 DIAGNOSIS — F98.8 ATTENTION DEFICIT DISORDER (ADD) WITHOUT HYPERACTIVITY: ICD-10-CM

## 2022-02-23 RX ORDER — DEXTROAMPHETAMINE SACCHARATE, AMPHETAMINE ASPARTATE MONOHYDRATE, DEXTROAMPHETAMINE SULFATE AND AMPHETAMINE SULFATE 5; 5; 5; 5 MG/1; MG/1; MG/1; MG/1
40 CAPSULE, EXTENDED RELEASE ORAL EVERY MORNING
Qty: 60 CAPSULE | Refills: 0 | Status: SHIPPED | OUTPATIENT
Start: 2022-02-23 | End: 2022-03-24

## 2022-02-23 NOTE — TELEPHONE ENCOUNTER
Reason for Call:  Medication or medication refill:    OK TO WAIT FOR PCP PER PATIENT    Do you use a [facility]?  Name of the pharmacy and phone number for the current request:    [facility]    Name of the medication requested:   Adderall XR 20mg    Other request: n/a     Can we leave a detailed message on this number? YES    Phone number patient can be reached at: Work number on file:  There is no work phone number on file.    Best Time: anytime    Call taken on 2/23/2022 at 12:45 PM by Cecilia Ponce

## 2022-03-23 DIAGNOSIS — F98.8 ATTENTION DEFICIT DISORDER (ADD) WITHOUT HYPERACTIVITY: ICD-10-CM

## 2022-03-23 NOTE — TELEPHONE ENCOUNTER
Reason for Call:  Medication or medication refill:    Do you use a [facility]?  Name of the pharmacy and phone number for the current request:    [facility]    Name of the medication requested:   Adderal XR 20mg -- pt is out of medication    Other request: n/a    Can we leave a detailed message on this number? YES    Phone number patient can be reached at: Home number on file [phone] (home)    Best Time: anytime    Call taken on 3/23/2022 at 1:29 PM by Cecilia Ponce

## 2022-03-23 NOTE — TELEPHONE ENCOUNTER
Medication: Adderall      CSA in last year: YES    Random Utox in last year: YES  (if any of the above answer NO - schedule with PCP)     On opioids in addition to benzodiazepines? NO  (if the above answer YES - schedule with PCP every 6 months)           PROVIDER TO PULL THE FOLLOWING FROM  :    1. Last date filled?  2.   Only PCP Prescribing?  3.   Taken as prescribed from physician notes?

## 2022-03-24 RX ORDER — DEXTROAMPHETAMINE SACCHARATE, AMPHETAMINE ASPARTATE MONOHYDRATE, DEXTROAMPHETAMINE SULFATE AND AMPHETAMINE SULFATE 5; 5; 5; 5 MG/1; MG/1; MG/1; MG/1
40 CAPSULE, EXTENDED RELEASE ORAL EVERY MORNING
Qty: 60 CAPSULE | Refills: 0 | Status: SHIPPED | OUTPATIENT
Start: 2022-03-24 | End: 2022-04-28

## 2022-04-18 DIAGNOSIS — I10 ESSENTIAL HYPERTENSION, BENIGN: ICD-10-CM

## 2022-04-18 DIAGNOSIS — R00.0 TACHYCARDIA: ICD-10-CM

## 2022-04-20 RX ORDER — METOPROLOL SUCCINATE 50 MG/1
TABLET, EXTENDED RELEASE ORAL
Qty: 90 TABLET | Refills: 1 | Status: SHIPPED | OUTPATIENT
Start: 2022-04-20 | End: 2022-10-21

## 2022-04-20 NOTE — TELEPHONE ENCOUNTER
"  Last Written Prescription Date:  3/10/2021  Last Fill Quantity: 90,  # refills: 3   Last office visit provider:  8/30/2021     Requested Prescriptions   Pending Prescriptions Disp Refills     metoprolol succinate ER (TOPROL-XL) 50 MG 24 hr tablet [Pharmacy Med Name: METOPROLOL ER SUCCINATE 50MG TABS] 90 tablet 3     Sig: TAKE 1 TABLET BY MOUTH EVERY DAY       Beta-Blockers Protocol Passed - 4/18/2022  2:10 PM        Passed - Blood pressure under 140/90 in past 12 months     BP Readings from Last 3 Encounters:   08/30/21 124/80   01/31/20 132/84   10/18/19 (!) 160/105                 Passed - Patient is age 6 or older        Passed - Recent (12 mo) or future (30 days) visit within the authorizing provider's specialty     Patient has had an office visit with the authorizing provider or a provider within the authorizing providers department within the previous 12 mos or has a future within next 30 days. See \"Patient Info\" tab in inbasket, or \"Choose Columns\" in Meds & Orders section of the refill encounter.              Passed - Medication is active on med list             Radha Izaguirre RN 04/20/22 12:53 PM  "

## 2022-04-26 DIAGNOSIS — F98.8 ATTENTION DEFICIT DISORDER (ADD) WITHOUT HYPERACTIVITY: ICD-10-CM

## 2022-04-26 NOTE — TELEPHONE ENCOUNTER
Reason for Call:  Medication or medication refill:    Do you use a [facility]?  Name of the pharmacy and phone number for the current request:    [facility]    Name of the medication requested:   Adderall 20mg 2 tabs daily #60    Other request: n/a    Can we leave a detailed message on this number? YES    Phone number patient can be reached at: Home number on file [phone] (home)    Best Time: anytime    Call taken on 4/26/2022 at 9:12 AM by Cecilia Ponce

## 2022-04-28 RX ORDER — DEXTROAMPHETAMINE SACCHARATE, AMPHETAMINE ASPARTATE MONOHYDRATE, DEXTROAMPHETAMINE SULFATE AND AMPHETAMINE SULFATE 5; 5; 5; 5 MG/1; MG/1; MG/1; MG/1
40 CAPSULE, EXTENDED RELEASE ORAL EVERY MORNING
Qty: 60 CAPSULE | Refills: 0 | Status: SHIPPED | OUTPATIENT
Start: 2022-04-28 | End: 2022-05-27

## 2022-05-25 ENCOUNTER — TELEPHONE (OUTPATIENT)
Dept: INTERNAL MEDICINE | Facility: CLINIC | Age: 67
End: 2022-05-25

## 2022-05-25 DIAGNOSIS — F98.8 ATTENTION DEFICIT DISORDER (ADD) WITHOUT HYPERACTIVITY: ICD-10-CM

## 2022-05-25 NOTE — TELEPHONE ENCOUNTER
Medication: Adderall 20 mg XR      CSA in last year: YES    Random Utox in last year: YES  (if any of the above answer NO - schedule with PCP)     On opioids in addition to benzodiazepines? NO  (if the above answer YES - schedule with PCP every 6 months)           PROVIDER TO PULL THE FOLLOWING FROM  :    1. Last date filled?  2.   Only PCP Prescribing?  3.   Taken as prescribed from physician notes?

## 2022-05-25 NOTE — TELEPHONE ENCOUNTER
Reason for Call:  Medication or medication refill:    Do you use a [facility]?  Name of the pharmacy and phone number for the current request:  [facility]-updated    Name of the medication requested:   amphetamine-dextroamphetamine (ADDERALL XR) 20 MG 24 hr capsule 60 capsule 0 4/28/2022  No   Sig - Route: Take 2 capsules (40 mg) by mouth every morning - Oral   Sent to pharmacy as: Amphetamine-Dextroamphet ER 20 MG Oral Capsule Extended Release 24 Hour (ADDERALL XR)           Other request: na    Can we leave a detailed message on this number? YES    Phone number patient can be reached at: Cell number on file:    Telephone Information:   Mobile [phone]       Best Time: any    Call taken on 5/25/2022 at 9:08 AM by Pam J. Behr

## 2022-05-27 RX ORDER — DEXTROAMPHETAMINE SACCHARATE, AMPHETAMINE ASPARTATE MONOHYDRATE, DEXTROAMPHETAMINE SULFATE AND AMPHETAMINE SULFATE 5; 5; 5; 5 MG/1; MG/1; MG/1; MG/1
40 CAPSULE, EXTENDED RELEASE ORAL EVERY MORNING
Qty: 60 CAPSULE | Refills: 0 | Status: SHIPPED | OUTPATIENT
Start: 2022-05-27 | End: 2022-06-30

## 2022-06-24 DIAGNOSIS — F98.8 ATTENTION DEFICIT DISORDER (ADD) WITHOUT HYPERACTIVITY: ICD-10-CM

## 2022-06-24 NOTE — TELEPHONE ENCOUNTER
Reason for Call:  Medication or medication refill:    Do you use a [facility]?  Name of the pharmacy and phone number for the current request:    [facility]    Name of the medication requested:   Adderall XR 20mg      Other request: n/a    Can we leave a detailed message on this number? YES    Phone number patient can be reached at: Home number on file [phone] (home)    Best Time: anytime    Call taken on 6/24/2022 at 9:22 AM by Cecilia Ponce

## 2022-06-27 ENCOUNTER — TELEPHONE (OUTPATIENT)
Dept: INTERNAL MEDICINE | Facility: CLINIC | Age: 67
End: 2022-06-27

## 2022-06-30 RX ORDER — DEXTROAMPHETAMINE SACCHARATE, AMPHETAMINE ASPARTATE MONOHYDRATE, DEXTROAMPHETAMINE SULFATE AND AMPHETAMINE SULFATE 5; 5; 5; 5 MG/1; MG/1; MG/1; MG/1
40 CAPSULE, EXTENDED RELEASE ORAL EVERY MORNING
Qty: 60 CAPSULE | Refills: 0 | Status: SHIPPED | OUTPATIENT
Start: 2022-06-30 | End: 2022-07-25

## 2022-07-25 DIAGNOSIS — F98.8 ATTENTION DEFICIT DISORDER (ADD) WITHOUT HYPERACTIVITY: ICD-10-CM

## 2022-07-25 RX ORDER — DEXTROAMPHETAMINE SACCHARATE, AMPHETAMINE ASPARTATE MONOHYDRATE, DEXTROAMPHETAMINE SULFATE AND AMPHETAMINE SULFATE 5; 5; 5; 5 MG/1; MG/1; MG/1; MG/1
40 CAPSULE, EXTENDED RELEASE ORAL EVERY MORNING
Qty: 60 CAPSULE | Refills: 0 | Status: SHIPPED | OUTPATIENT
Start: 2022-07-25 | End: 2022-08-23

## 2022-07-25 NOTE — TELEPHONE ENCOUNTER
Requested Medication:   Pending Prescriptions:                       Disp   Refills    amphetamine-dextroamphetamine (ADDERALL X*60 cap*0            Sig: Take 2 capsules (40 mg) by mouth every morning       Last Refill:  6/30/22    Last Office Visit:  8/30/2021     Next Appointment with Provider:  Visit date not found   Future Appointments 7/25/2022 - 1/21/2023    None          Clinic visit frequency required: Q 6  months    Controlled substance agreement on file: Yes:  Date 9/24/2021.    Urine Drug Screen on file:  No      Pt has a couple days left.

## 2022-07-28 ENCOUNTER — NURSE TRIAGE (OUTPATIENT)
Dept: NURSING | Facility: CLINIC | Age: 67
End: 2022-07-28

## 2022-07-28 NOTE — TELEPHONE ENCOUNTER
"S-(situation): upper back pain    B-(background):   Pt states symptoms have been present after winter, then changed his statement and said it's been present for weeks.  Thinks it might be from an old injury when he was way younger.    A-(assessment):   Knot on upper left back, sore for a week and gone away, but states that it feels sore again.  Able to use left arm  Pt states pain is getting worse. Rated 9/10 then he states that pain is not present now as he is just sitting on a bench.    \"I feel nothing now.  I dont slouch  Acting up a little bit after I move\"    Also reports insomnia    No abdominal pain.  No leg pain/weakness.    R-(recommendations): Be seen within 24 hours. No immediate opening with PCP so advised [facility] in care. Pt verbalized understanding and transferred to .  Pt scheduled on 8/22 with PCP.    Katy Holland RN/[place] Nurse Advisor      Reason for Disposition    Age > 50 and no history of prior similar back pain    Additional Information    Negative: Passed out (i.e., fainted, collapsed and was not responding)    Negative: Shock suspected (e.g., cold/pale/clammy skin, too weak to stand, low BP, rapid pulse)    Negative: Sounds like a life-threatening emergency to the triager    Negative: SEVERE back pain of sudden onset and age > 60    Negative: SEVERE abdominal pain (e.g., excruciating)    Negative: Abdominal pain and age > 60    Negative: Unable to urinate (or only a few drops) and bladder feels very full    Negative: Loss of bladder or bowel control (urine or bowel incontinence; wetting self, leaking stool) of new onset    Negative: Numbness (loss of sensation) in groin or rectal area    Negative: Pain radiates into groin, scrotum    Negative: Blood in urine (red, pink, or tea-colored)    Negative: Vomiting and pain over lower ribs of back (i.e., flank - kidney area)    Negative: Weakness of a leg or foot (e.g., unable to bear weight, dragging foot)    Negative: Patient " sounds very sick or weak to the triager    Negative: Fever > 100.4 F (38.0 C) and flank pain    Negative: Pain or burning with passing urine (urination)    Negative: SEVERE back pain (e.g., excruciating, unable to do any normal activities) and not improved after pain medicine and CARE ADVICE    Negative: Numbness in an arm or hand (i.e., loss of sensation) and upper back pain    Negative: Numbness in a leg or foot (i.e., loss of sensation)    Negative: High-risk adult (e.g., history of cancer, history of HIV, or history of IV drug abuse)    Negative: Painful rash with multiple small blisters grouped together (i.e., dermatomal distribution or 'band' or 'stripe')    Negative: Pain radiates into the thigh or further down the leg, and in both legs    Protocols used: BACK PAIN-A-OH

## 2022-08-22 ENCOUNTER — TELEPHONE (OUTPATIENT)
Dept: INTERNAL MEDICINE | Facility: CLINIC | Age: 67
End: 2022-08-22

## 2022-08-22 DIAGNOSIS — F98.8 ATTENTION DEFICIT DISORDER (ADD) WITHOUT HYPERACTIVITY: ICD-10-CM

## 2022-08-22 NOTE — TELEPHONE ENCOUNTER
Medication Question or Refill        What medication are you calling about (include dose and sig)?:   amphetamine-dextroamphetamine (ADDERALL XR) 20 MG 24 hr capsule 60 capsule 0 7/25/2022  No   Sig - Route: Take 2 capsules (40 mg) by mouth every morning - Oral   Sent to pharmacy as: Amphetamine-Dextroamphet ER 20 MG Oral Capsule Extended Release 24 Hour (ADDERALL XR)   Class: E-Prescribe         Controlled Substance Agreement on file:   CSA -- Patient Level:    Controlled Substance Agreement - Non - Opioid - Scan on 9/24/2021  1:18 PM: NON-OPIOID CONTROLLED SUBSTANCE AGREEMENT  Controlled Substance Agreement - Non - Opioid - Scan on 2/5/2020: NON-OPIOID CONTROLLED SUBSTANCE AGREEMENT  Controlled Substance Agreement - Non - Opioid - Scan on 10/24/2019  Controlled Substance Agreement - Opioid - Scan on 10/22/2018  Controlled Substance Agreement - Opioid - Scan on 4/19/2018  Controlled Substance Agreement - Opioid - Scan on 10/2/2017  Controlled Substance Agreement - Opioid - Scan on 7/27/2016: CONTROLLED SUBSTANCE 7/25/2016       Who prescribed the medication?: Dr Villalpando    Do you need a refill? Yes:     When did you use the medication last?     Patient offered an appointment? No    Do you have any questions or concerns?  No    Preferred Pharmacy:   [id number] - [place], [place] - [address] [place] [address]  Phone: [phone] Fax: [phone]      Could we send this information to you in [facility] or would you prefer to receive a phone call?:   Patient would prefer a phone call   Okay to leave a detailed message?: Yes at Cell number on file:    Telephone Information:   Mobile [phone]

## 2022-08-23 RX ORDER — DEXTROAMPHETAMINE SACCHARATE, AMPHETAMINE ASPARTATE MONOHYDRATE, DEXTROAMPHETAMINE SULFATE AND AMPHETAMINE SULFATE 5; 5; 5; 5 MG/1; MG/1; MG/1; MG/1
40 CAPSULE, EXTENDED RELEASE ORAL EVERY MORNING
Qty: 60 CAPSULE | Refills: 0 | Status: SHIPPED | OUTPATIENT
Start: 2022-08-23 | End: 2022-09-26

## 2022-09-21 ENCOUNTER — TELEPHONE (OUTPATIENT)
Dept: INTERNAL MEDICINE | Facility: CLINIC | Age: 67
End: 2022-09-21

## 2022-09-21 DIAGNOSIS — F98.8 ATTENTION DEFICIT DISORDER (ADD) WITHOUT HYPERACTIVITY: ICD-10-CM

## 2022-09-21 NOTE — TELEPHONE ENCOUNTER
Medication Question or Refill        What medication are you calling about (include dose and sig)?:   amphetamine-dextroamphetamine (ADDERALL XR) 20 MG 24 hr capsule 60 capsule 0 8/23/2022  No   Sig - Route: Take 2 capsules (40 mg) by mouth every morning - Oral         Controlled Substance Agreement on file:   CSA -- Patient Level:    Controlled Substance Agreement - Non - Opioid - Scan on 9/24/2021  1:18 PM: NON-OPIOID CONTROLLED SUBSTANCE AGREEMENT  Controlled Substance Agreement - Non - Opioid - Scan on 2/5/2020: NON-OPIOID CONTROLLED SUBSTANCE AGREEMENT  Controlled Substance Agreement - Non - Opioid - Scan on 10/24/2019  Controlled Substance Agreement - Opioid - Scan on 10/22/2018  Controlled Substance Agreement - Opioid - Scan on 4/19/2018  Controlled Substance Agreement - Opioid - Scan on 10/2/2017  Controlled Substance Agreement - Opioid - Scan on 7/27/2016: CONTROLLED SUBSTANCE 7/25/2016           Preferred Pharmacy:   [id number] - [place], [place] - [address] [place] [address] [address] [place] [address]  Phone: [phone] Fax: [phone]      Could we send this information to you in [facility] or would you prefer to receive a phone call?:   Patient would prefer a phone call   Okay to leave a detailed message?: Yes at Cell number on file:    Telephone Information:   Mobile [phone]

## 2022-09-26 RX ORDER — DEXTROAMPHETAMINE SACCHARATE, AMPHETAMINE ASPARTATE MONOHYDRATE, DEXTROAMPHETAMINE SULFATE AND AMPHETAMINE SULFATE 5; 5; 5; 5 MG/1; MG/1; MG/1; MG/1
40 CAPSULE, EXTENDED RELEASE ORAL EVERY MORNING
Qty: 60 CAPSULE | Refills: 0 | Status: SHIPPED | OUTPATIENT
Start: 2022-09-26 | End: 2022-10-20

## 2022-10-01 ENCOUNTER — HEALTH MAINTENANCE LETTER (OUTPATIENT)
Age: 67
End: 2022-10-01

## 2022-12-19 ENCOUNTER — TELEPHONE (OUTPATIENT)
Dept: INTERNAL MEDICINE | Facility: CLINIC | Age: 67
End: 2022-12-19

## 2022-12-19 DIAGNOSIS — F98.8 ATTENTION DEFICIT DISORDER (ADD) WITHOUT HYPERACTIVITY: ICD-10-CM

## 2022-12-19 RX ORDER — DEXTROAMPHETAMINE SACCHARATE, AMPHETAMINE ASPARTATE MONOHYDRATE, DEXTROAMPHETAMINE SULFATE AND AMPHETAMINE SULFATE 5; 5; 5; 5 MG/1; MG/1; MG/1; MG/1
40 CAPSULE, EXTENDED RELEASE ORAL EVERY MORNING
Qty: 60 CAPSULE | Refills: 0 | Status: SHIPPED | OUTPATIENT
Start: 2022-12-19 | End: 2022-12-19

## 2022-12-19 RX ORDER — DEXTROAMPHETAMINE SACCHARATE, AMPHETAMINE ASPARTATE MONOHYDRATE, DEXTROAMPHETAMINE SULFATE AND AMPHETAMINE SULFATE 5; 5; 5; 5 MG/1; MG/1; MG/1; MG/1
40 CAPSULE, EXTENDED RELEASE ORAL EVERY MORNING
Qty: 60 CAPSULE | Refills: 0 | Status: SHIPPED | OUTPATIENT
Start: 2022-12-19 | End: 2023-01-17

## 2022-12-19 NOTE — TELEPHONE ENCOUNTER
General Call      Reason for Call:  Pt calling stating his current [facility] is closed  Pt requesting that his Rx for Adderall be sent to:  [facility]    Please call patient when Rx has been resent to different pharmacy    What are your questions or concerns:  n/a    Date of last appointment with provider: n/a    Could we send this information to you in [facility] or would you prefer to receive a phone call?:   Patient would prefer a phone call   Okay to leave a detailed message?: Yes at Home number on file [phone] (home)

## 2023-01-02 DIAGNOSIS — I10 HYPERTENSION: ICD-10-CM

## 2023-01-02 DIAGNOSIS — F31.75 BIPOLAR DISORDER, IN PARTIAL REMISSION, MOST RECENT EPISODE DEPRESSED (H): ICD-10-CM

## 2023-01-02 RX ORDER — QUETIAPINE FUMARATE 50 MG/1
TABLET, FILM COATED ORAL
Qty: 60 TABLET | Refills: 11 | Status: SHIPPED | OUTPATIENT
Start: 2023-01-02 | End: 2023-11-01

## 2023-01-02 RX ORDER — AMLODIPINE BESYLATE 5 MG/1
5 TABLET ORAL DAILY
Qty: 30 TABLET | Refills: 6 | Status: SHIPPED | OUTPATIENT
Start: 2023-01-02 | End: 2023-08-01

## 2023-01-17 ENCOUNTER — TELEPHONE (OUTPATIENT)
Dept: INTERNAL MEDICINE | Facility: CLINIC | Age: 68
End: 2023-01-17

## 2023-01-17 DIAGNOSIS — F98.8 ATTENTION DEFICIT DISORDER (ADD) WITHOUT HYPERACTIVITY: ICD-10-CM

## 2023-01-17 RX ORDER — DEXTROAMPHETAMINE SACCHARATE, AMPHETAMINE ASPARTATE MONOHYDRATE, DEXTROAMPHETAMINE SULFATE AND AMPHETAMINE SULFATE 5; 5; 5; 5 MG/1; MG/1; MG/1; MG/1
40 CAPSULE, EXTENDED RELEASE ORAL EVERY MORNING
Qty: 60 CAPSULE | Refills: 0 | Status: SHIPPED | OUTPATIENT
Start: 2023-01-17 | End: 2023-02-16

## 2023-01-17 NOTE — TELEPHONE ENCOUNTER
General Call      Reason for Call:  Patient calling to say thank you for taking care of him, he wishes you well on your retirment    What are your questions or concerns:  n/a    Date of last appointment with provider: n/a    Could we send this information to you in [place] or would you prefer to receive a phone call?:   Patient would prefer a phone call   Okay to leave a detailed message?: Yes at Home number on file [phone] (home)

## 2023-02-16 DIAGNOSIS — F98.8 ATTENTION DEFICIT DISORDER (ADD) WITHOUT HYPERACTIVITY: ICD-10-CM

## 2023-02-16 RX ORDER — DEXTROAMPHETAMINE SACCHARATE, AMPHETAMINE ASPARTATE MONOHYDRATE, DEXTROAMPHETAMINE SULFATE AND AMPHETAMINE SULFATE 5; 5; 5; 5 MG/1; MG/1; MG/1; MG/1
40 CAPSULE, EXTENDED RELEASE ORAL EVERY MORNING
Qty: 60 CAPSULE | Refills: 0 | Status: SHIPPED | OUTPATIENT
Start: 2023-02-16 | End: 2023-03-15

## 2023-03-15 DIAGNOSIS — F98.8 ATTENTION DEFICIT DISORDER (ADD) WITHOUT HYPERACTIVITY: ICD-10-CM

## 2023-03-15 RX ORDER — DEXTROAMPHETAMINE SACCHARATE, AMPHETAMINE ASPARTATE MONOHYDRATE, DEXTROAMPHETAMINE SULFATE AND AMPHETAMINE SULFATE 5; 5; 5; 5 MG/1; MG/1; MG/1; MG/1
40 CAPSULE, EXTENDED RELEASE ORAL EVERY MORNING
Qty: 60 CAPSULE | Refills: 0 | Status: SHIPPED | OUTPATIENT
Start: 2023-03-15 | End: 2023-04-13

## 2023-03-20 ENCOUNTER — OFFICE VISIT (OUTPATIENT)
Dept: INTERNAL MEDICINE | Facility: CLINIC | Age: 68
End: 2023-03-20
Payer: COMMERCIAL

## 2023-03-20 VITALS
SYSTOLIC BLOOD PRESSURE: 122 MMHG | BODY MASS INDEX: 27.25 KG/M2 | DIASTOLIC BLOOD PRESSURE: 84 MMHG | WEIGHT: 184 LBS | OXYGEN SATURATION: 98 % | HEART RATE: 85 BPM | HEIGHT: 69 IN | RESPIRATION RATE: 16 BRPM | TEMPERATURE: 98.6 F

## 2023-03-20 DIAGNOSIS — F98.8 ATTENTION DEFICIT DISORDER (ADD) WITHOUT HYPERACTIVITY: ICD-10-CM

## 2023-03-20 DIAGNOSIS — Z87.891 PERSONAL HISTORY OF TOBACCO USE: ICD-10-CM

## 2023-03-20 DIAGNOSIS — Z12.11 SCREEN FOR COLON CANCER: ICD-10-CM

## 2023-03-20 DIAGNOSIS — I10 ESSENTIAL HYPERTENSION: ICD-10-CM

## 2023-03-20 DIAGNOSIS — G47.9 SLEEP DISORDER: ICD-10-CM

## 2023-03-20 DIAGNOSIS — F17.209 TOBACCO USE DISORDER, CONTINUOUS: ICD-10-CM

## 2023-03-20 DIAGNOSIS — Z23 ENCOUNTER FOR IMMUNIZATION: ICD-10-CM

## 2023-03-20 DIAGNOSIS — E78.5 HYPERLIPIDEMIA LDL GOAL <100: Primary | ICD-10-CM

## 2023-03-20 LAB
ALBUMIN SERPL BCG-MCNC: 4.6 G/DL (ref 3.5–5.2)
ALP SERPL-CCNC: 96 U/L (ref 40–129)
ALT SERPL W P-5'-P-CCNC: 20 U/L (ref 10–50)
ANION GAP SERPL CALCULATED.3IONS-SCNC: 10 MMOL/L (ref 7–15)
AST SERPL W P-5'-P-CCNC: 27 U/L (ref 10–50)
BILIRUB SERPL-MCNC: 0.7 MG/DL
BUN SERPL-MCNC: 32.8 MG/DL (ref 8–23)
CALCIUM SERPL-MCNC: 10.9 MG/DL (ref 8.8–10.2)
CHLORIDE SERPL-SCNC: 102 MMOL/L (ref 98–107)
CHOLEST SERPL-MCNC: 273 MG/DL
CREAT SERPL-MCNC: 1.29 MG/DL (ref 0.67–1.17)
DEPRECATED HCO3 PLAS-SCNC: 27 MMOL/L (ref 22–29)
ERYTHROCYTE [DISTWIDTH] IN BLOOD BY AUTOMATED COUNT: 12 % (ref 10–15)
GFR SERPL CREATININE-BSD FRML MDRD: 61 ML/MIN/1.73M2
GLUCOSE SERPL-MCNC: 169 MG/DL (ref 70–99)
HCT VFR BLD AUTO: 47.2 % (ref 40–53)
HDLC SERPL-MCNC: 39 MG/DL
HGB BLD-MCNC: 16.4 G/DL (ref 13.3–17.7)
LDLC SERPL CALC-MCNC: 185 MG/DL
MCH RBC QN AUTO: 30.7 PG (ref 26.5–33)
MCHC RBC AUTO-ENTMCNC: 34.7 G/DL (ref 31.5–36.5)
MCV RBC AUTO: 88 FL (ref 78–100)
NONHDLC SERPL-MCNC: 234 MG/DL
PLATELET # BLD AUTO: 210 10E3/UL (ref 150–450)
POTASSIUM SERPL-SCNC: 4.7 MMOL/L (ref 3.4–5.3)
PROT SERPL-MCNC: 7.7 G/DL (ref 6.4–8.3)
RBC # BLD AUTO: 5.35 10E6/UL (ref 4.4–5.9)
SODIUM SERPL-SCNC: 139 MMOL/L (ref 136–145)
TRIGL SERPL-MCNC: 245 MG/DL
WBC # BLD AUTO: 6.4 10E3/UL (ref 4–11)

## 2023-03-20 PROCEDURE — 0011A COVID-19 VACCINE 18+ (MODERNA PRIMARY SERIES .5ML): CPT | Performed by: INTERNAL MEDICINE

## 2023-03-20 PROCEDURE — 99214 OFFICE O/P EST MOD 30 MIN: CPT | Mod: 25 | Performed by: INTERNAL MEDICINE

## 2023-03-20 PROCEDURE — 85027 COMPLETE CBC AUTOMATED: CPT | Performed by: INTERNAL MEDICINE

## 2023-03-20 PROCEDURE — 36415 COLL VENOUS BLD VENIPUNCTURE: CPT | Performed by: INTERNAL MEDICINE

## 2023-03-20 PROCEDURE — G0009 ADMIN PNEUMOCOCCAL VACCINE: HCPCS | Performed by: INTERNAL MEDICINE

## 2023-03-20 PROCEDURE — G0296 VISIT TO DETERM LDCT ELIG: HCPCS | Performed by: INTERNAL MEDICINE

## 2023-03-20 PROCEDURE — 80061 LIPID PANEL: CPT | Performed by: INTERNAL MEDICINE

## 2023-03-20 PROCEDURE — 91301 COVID-19 VACCINE 18+ (MODERNA PRIMARY SERIES .5ML): CPT | Performed by: INTERNAL MEDICINE

## 2023-03-20 PROCEDURE — 90677 PCV20 VACCINE IM: CPT | Performed by: INTERNAL MEDICINE

## 2023-03-20 PROCEDURE — 80053 COMPREHEN METABOLIC PANEL: CPT | Performed by: INTERNAL MEDICINE

## 2023-03-20 NOTE — PROGRESS NOTES
ASSESSMENT AND PLAN:    1. Screen for colon cancer  He has cologuard, will send it in.     2. Hyperlipidemia LDL goal <100  Doesn't want statin therapy.     3. Essential hypertension  Satisfactory control.     4. Tobacco use disorder, continuous  Not high use, he agrees to low dose CT screening.     5. Attention deficit disorder (ADD) without hyperactivity  Ongoing use of adderall.     6. History of alcoholism  Sober now 10 years.  He feels taking adderall has helped keep him off of alcohol.     7. Immunizations  He wants to start Covid vaccination, and get pneumovax today. We discussed getting shingrix as well at pharmacy.     8. History of hepatitis C  He is s/p treatment - oral and subcutaneous agents were used.     Follow up in 6 months    CHIEF COMPLAINT:  Establish care, preventive care    HISTORY OF PRESENT ILLNESS:  Socrates Paez is a 67 year old male with follow up to establish care. Smoking still, a few cigarettes per day.  No unusual dyspnea or cough. No bowel or bladder issues. Weight is modestly up.  No bowel or bladder issues. Wants to get covid vaccination.   Not much exercise, likes to do cycling in summer.     REVIEW OF SYSTEMS:   See HPI, all other systems on review are negative.    Past Medical History:   Diagnosis Date     ADHD (attention deficit hyperactivity disorder)      Alcoholism (H)      Bipolar disorder (H)      Chronic viral hepatitis C (H)     treated     Depression      Essential hypertension      Hyperlipidemia LDL goal <100      Psoriasis      Tobacco dependence syndrome      History   Smoking Status     Every Day     Packs/day: 0.25     Types: Cigarettes   Smokeless Tobacco     Never     Comment: 5 cigarettes a day     Family History   Problem Relation Age of Onset     No Known Problems Mother         unknown cause age 53     Alcoholism Father      Cirrhosis Father          age 66     HIV/AIDS Brother         age 28     Throat cancer Brother         smoker     Past Surgical  "History:   Procedure Laterality Date     LIVER BIOPSY       VITALS:  Vitals:    03/20/23 1215   BP: 122/84   BP Location: Left arm   Patient Position: Sitting   Cuff Size: Adult Regular   Pulse: 85   Resp: 16   Temp: 98.6  F (37  C)   TempSrc: Tympanic   SpO2: 98%   Weight: 83.5 kg (184 lb)   Height: 1.753 m (5' 9\")     Wt Readings from Last 3 Encounters:   03/20/23 83.5 kg (184 lb)   08/30/21 79.4 kg (175 lb 1.6 oz)   01/31/20 83.9 kg (185 lb)     PHYSICAL EXAM:  Constitutional:  In NAD, alert and oriented  Cardiac:  S1 S2   Lungs: Clear   Abdomen:   Soft, flat and non-tender    DECISION TO OBTAIN OLD RECORDS AND/OR OBTAIN HISTORY FROM SOMEONE OTHER THAN PATIENT, AND/OR ACCESSING CARE EVERYWHERE):  1  0     REVIEW AND SUMMARIZATION OF OLD RECORDS, AND/OR OBTAINING HISTORY FROM SOMEONE OTHER THAN PATIENT, AND/OR DISCUSSION OF CASE WITH ANOTHER HEALTH CARE PROVIDER:  2 reviewed past health evaluations    REVIEW AND/OR ORDER OF OF CLINICAL LAB TESTS: 1  ordered.    REVIEW AND/OR ORDER OF RADIOLOGY TESTS: 1 ordered low dose chest CT.    REVIEW AND/OR ORDER OF MEDICAL TESTS (EKG/ECHO/COLONOSCOPY/EGD): 1  Urged to send in cologuard    INDEPENDENT  VISUALIZATION OF IMAGE, TRACING, OR SPECIMEN ITSELF (2 EACH):  0     TOTAL: 5    Current Outpatient Medications   Medication Sig Dispense Refill     amLODIPine (NORVASC) 5 MG tablet Take 1 tablet (5 mg) by mouth daily 30 tablet 6     amphetamine-dextroamphetamine (ADDERALL XR) 20 MG 24 hr capsule Take 2 capsules (40 mg) by mouth every morning 60 capsule 0     ibuprofen (ADVIL/MOTRIN) 600 MG tablet TAKE 1 TABLET(600 MG) BY MOUTH EVERY 8 HOURS AS NEEDED FOR MODERATE PAIN 90 tablet 3     losartan-hydrochlorothiazide (HYZAAR) 100-25 MG tablet TAKE 1 TABLET BY MOUTH DAILY 90 tablet 3     metoprolol succinate ER (TOPROL XL) 50 MG 24 hr tablet Take 1 tablet (50 mg) by mouth daily 90 tablet 3     QUEtiapine (SEROQUEL) 50 MG tablet TAKE 1-2 TABLETS BY MOUTH EVERY NIGHT AT BEDTIME 60 " tablet 11     fluticasone propionate (FLONASE) 50 mcg/actuation nasal spray [FLUTICASONE PROPIONATE (FLONASE) 50 MCG/ACTUATION NASAL SPRAY] SHAKE LIQUID AND USE 2 SPRAYS IN EACH NOSTRIL DAILY (Patient not taking: Reported on 3/20/2023) 48 g 3     loratadine (CLARITIN) 10 MG tablet TAKE 1 TABLET(10 MG) BY MOUTH DAILY (Patient not taking: Reported on 3/20/2023) 30 tablet 11     nicotine polacrilex (NICORETTE) 4 MG gum [NICOTINE POLACRILEX (NICORETTE) 4 MG GUM] Apply 1 each (4 mg total) to the mouth or throat as needed for smoking cessation. (Patient not taking: Reported on 3/20/2023) 160 each 5     Clyde Day MD  Internal Medicine  [facility]    Lung Cancer Screening Shared Decision Making Visit     Socrates Paez, a 67 year old male, is eligible for lung cancer screening    History   Smoking Status     Every Day     Packs/day: 0.25     Types: Cigarettes   Smokeless Tobacco     Never     Comment: 5 cigarettes a day     I have discussed with patient the risks and benefits of screening for lung cancer with low-dose CT.     The risks include:    radiation exposure: one low dose chest CT has as much ionizing radiation as about 15 chest x-rays, or 6 months of background radiation living in [place]      false positives: most findings/nodules are NOT cancer, but some might still require additional diagnostic evaluation, including biopsy    over-diagnosis: some slow growing cancers that might never have been clinically significant will be detected and treated unnecessarily     The benefit of early detection of lung cancer is contingent upon adherence to annual screening or more frequent follow up if indicated.     Furthermore, to benefit from screening, Les must be willing and able to undergo diagnostic procedures, if indicated. Although no specific guide is available for determining severity of comorbidities, it is reasonable to withhold screening in patients who have greater mortality risk from  other diseases.     We did discuss that the best way to prevent lung cancer is to not smoke.    Some patients may value a numeric estimation of lung cancer risk when evaluating if lung cancer screening is right for them, here is one calculator:

## 2023-03-20 NOTE — PATIENT INSTRUCTIONS
Lung Cancer Screening   Frequently Asked Questions  If you are at high-risk for lung cancer, getting screened with low-dose computed tomography (LDCT) every year can help save your life. This handout offers answers to some of the most common questions about lung cancer screening. If you have other questions, please call [phone] ([phone]).     What is it?  Lung cancer screening uses special X-ray technology to create an image of your lung tissue. The exam is quick and easy and takes less than 10 seconds. We don t give you any medicine or use any needles. You can eat before and after the exam. You don t need to change your clothes as long as the clothing on your chest doesn t contain metal. But, you do need to be able to hold your breath for at least 6 seconds during the exam.    What is the goal of lung cancer screening?  The goal of lung cancer screening is to save lives. Many times, lung cancer is not found until a person starts having physical symptoms. Lung cancer screening can help detect lung cancer in the earliest stages when it may be easier to treat.    Who should be screened for lung cancer?  We suggest lung cancer screening for anyone who is at high-risk for lung cancer. You are in the high-risk group if you:      are between the ages of 55 and 79, and    have smoked at least 1 pack of cigarettes a day for 20 or more years, and    still smoke or have quit within the past 15 years.    However, if you have a new cough or shortness of breath, you should talk to your doctor before being screened.    Why does it matter if I have symptoms?  Certain symptoms can be a sign that you have a condition in your lungs that should be checked and treated by your doctor. These symptoms include fever, chest pain, a new or changing cough, shortness of breath that you have never felt before, coughing up blood or unexplained weight loss. Having any of these symptoms can greatly affect the results of lung  cancer screening.       Should all smokers get an LDCT lung cancer screening exam?  It depends. Lung cancer screening is for a very specific group of men and women who have a history of heavy smoking over a long period of time (see  Who should be screened for lung cancer  above).  I am in the high-risk group, but have been diagnosed with cancer in the past. Is LDCT lung cancer screening right for me?  In some cases, you should not have LDCT lung screening, such as when your doctor is already following your cancer with CT scan studies. Your doctor will help you decide if LDCT lung screening is right for you.  Do I need to have a screening exam every year?  Yes. If you are in the high-risk group described earlier, you should get an LDCT lung cancer screening exam every year until you are 79, or are no longer willing or able to undergo screening and possible procedures to diagnose and treat lung cancer.  How effective is LDCT at preventing death from lung cancer?  Studies have shown that LDCT lung cancer screening can lower the risk of death from lung cancer by 20 percent in people who are at high-risk.  What are the risks?  There are some risks and limitations of LDCT lung cancer screening. We want to make sure you understand the risks and benefits, so please let us know if you have any questions. Your doctor may want to talk with you more about these risks.    Radiation exposure: As with any exam that uses radiation, there is a very small increased risk of cancer. The amount of radiation in LDCT is small--about the same amount a person would get from a mammogram. Your doctor orders the exam when he or she feels the potential benefits outweigh the risks.    False negatives: No test is perfect, including LDCT. It is possible that you may have a medical condition, including lung cancer, that is not found during your exam. This is called a false negative result.    False positives and more testing: LDCT very often finds  something in the lung that could be cancer, but in fact is not. This is called a false positive result. False positive tests often cause anxiety. To make sure these findings are not cancer, you may need to have more tests. These tests will be done only if you give us permission. Sometimes patients need a treatment that can have side effects, such as a biopsy. For more information on false positives, see  What can I expect from the results?     Findings not related to lung cancer: Your LDCT exam also takes pictures of areas of your body next to your lungs. In a very small number of cases, the CT scan will show an abnormal finding in one of these areas, such as your kidneys, adrenal glands, liver or thyroid. This finding may not be serious, but you may need more tests. Your doctor can help you decide what other tests you may need, if any.  What can I expect from the results?  About 1 out of 4 LDCT exams will find something that may need more tests. Most of the time, these findings are lung nodules. Lung nodules are very small collections of tissue in the lung. These nodules are very common, and the vast majority--more than 97 percent--are not cancer (benign). Most are normal lymph nodes or small areas of scarring from past infections.  But, if a small lung nodule is found to be cancer, the cancer can be cured more than 90 percent of the time. To know if the nodule is cancer, we may need to get more images before your next yearly screening exam. If the nodule has suspicious features (for example, it is large, has an odd shape or grows over time), we will refer you to a specialist for further testing.  Will my doctor also get the results?  Yes. Your doctor will get a copy of your results.  Is it okay to keep smoking now that there s a cancer screening exam?  No. Tobacco is one of the strongest cancer-causing agents. It causes not only lung cancer, but other cancers and cardiovascular (heart) diseases as well. The damage  caused by smoking builds over time. This means that the longer you smoke, the higher your risk of disease. While it is never too late to quit, the sooner you quit, the better.  Where can I find help to quit smoking?  The best way to prevent lung cancer is to stop smoking. If you have already quit smoking, congratulations and keep it up! For help on quitting smoking, please call [employer] at 8-718-QUITNOW ([phone]) or the American Cancer Society at 1-227.823.8384 to find local resources near you.  One-on-one health coaching:  If you d prefer to work individually with a health care provider on tobacco cessation, we offer:      Medication Therapy Management:  Our specially trained pharmacists work closely with you and your doctor to help you quit smoking.  Call [phone] or [phone] (toll free).

## 2023-03-20 NOTE — LETTER
March 28, 2023      Socrates Paez  [address]   [place] [address]        Dear ,    We are writing to inform you of your test results.    Your cholesterol levels are high, and I do think you should consider taking medication.  Please send me a [employer] message or a phone call message if you are interested.  Treating your cholesterol is easy and will lower your risk of heart attack or stroke.      Resulted Orders   Lipid panel reflex to direct LDL Non-fasting   Result Value Ref Range    Cholesterol 273 (H) <200 mg/dL    Triglycerides 245 (H) <150 mg/dL    Direct Measure HDL 39 (L) >=40 mg/dL    LDL Cholesterol Calculated 185 (H) <=100 mg/dL    Non HDL Cholesterol 234 (H) <130 mg/dL    Narrative    Cholesterol  Desirable:  <200 mg/dL    Triglycerides  Normal:  Less than 150 mg/dL  Borderline High:  150-199 mg/dL  High:  200-499 mg/dL  Very High:  Greater than or equal to 500 mg/dL    Direct Measure HDL  Female:  Greater than or equal to 50 mg/dL   Male:  Greater than or equal to 40 mg/dL    LDL Cholesterol  Desirable:  <100mg/dL  Above Desirable:  100-129 mg/dL   Borderline High:  130-159 mg/dL   High:  160-189 mg/dL   Very High:  >= 190 mg/dL    Non HDL Cholesterol  Desirable:  130 mg/dL  Above Desirable:  130-159 mg/dL  Borderline High:  160-189 mg/dL  High:  190-219 mg/dL  Very High:  Greater than or equal to 220 mg/dL   CBC with platelets   Result Value Ref Range    WBC Count 6.4 4.0 - 11.0 10e3/uL    RBC Count 5.35 4.40 - 5.90 10e6/uL    Hemoglobin 16.4 13.3 - 17.7 g/dL    Hematocrit 47.2 40.0 - 53.0 %    MCV 88 78 - 100 fL    MCH 30.7 26.5 - 33.0 pg    MCHC 34.7 31.5 - 36.5 g/dL    RDW 12.0 10.0 - 15.0 %    Platelet Count 210 150 - 450 10e3/uL   Comprehensive metabolic panel   Result Value Ref Range    Sodium 139 136 - 145 mmol/L    Potassium 4.7 3.4 - 5.3 mmol/L    Chloride 102 98 - 107 mmol/L    Carbon Dioxide (CO2) 27 22 - 29 mmol/L    Anion Gap 10 7 - 15 mmol/L    Urea Nitrogen 32.8  (H) 8.0 - 23.0 mg/dL    Creatinine 1.29 (H) 0.67 - 1.17 mg/dL    Calcium 10.9 (H) 8.8 - 10.2 mg/dL    Glucose 169 (H) 70 - 99 mg/dL    Alkaline Phosphatase 96 40 - 129 U/L    AST 27 10 - 50 U/L    ALT 20 10 - 50 U/L    Protein Total 7.7 6.4 - 8.3 g/dL    Albumin 4.6 3.5 - 5.2 g/dL    Bilirubin Total 0.7 <=1.2 mg/dL    GFR Estimate 61 >60 mL/min/1.73m2      Comment:      eGFR calculated using 2021 CKD-EPI equation.       If you have any questions or concerns, please call the clinic at the number listed above.       Sincerely,      Clyde Day MD

## 2023-04-13 DIAGNOSIS — F98.8 ATTENTION DEFICIT DISORDER (ADD) WITHOUT HYPERACTIVITY: ICD-10-CM

## 2023-04-13 RX ORDER — DEXTROAMPHETAMINE SACCHARATE, AMPHETAMINE ASPARTATE MONOHYDRATE, DEXTROAMPHETAMINE SULFATE AND AMPHETAMINE SULFATE 5; 5; 5; 5 MG/1; MG/1; MG/1; MG/1
40 CAPSULE, EXTENDED RELEASE ORAL EVERY MORNING
Qty: 60 CAPSULE | Refills: 0 | Status: SHIPPED | OUTPATIENT
Start: 2023-04-13 | End: 2023-05-16

## 2023-04-17 ENCOUNTER — TELEPHONE (OUTPATIENT)
Dept: INTERNAL MEDICINE | Facility: CLINIC | Age: 68
End: 2023-04-17

## 2023-04-17 NOTE — TELEPHONE ENCOUNTER
General Call      Reason for Call:  Pt stating per insurance ([employer]) he needs to have his PCP sign a waiver stating that he needs to take 2 20mg tables of Adderall daily    Please send waive to [facility]    Please advise    What are your questions or concerns:  n/a    Date of last appointment with provider: n/a    Could we send this information to you in [facility] or would you prefer to receive a phone call?:   Patient would prefer a phone call   Okay to leave a detailed message?: Yes at Home number on file [phone] (home)

## 2023-04-17 NOTE — TELEPHONE ENCOUNTER
Per the patient insurance plan 2 tablets daily exceeds the insurance company max of one tablet per day. Please call plan at [phone] to initiate prior authorization of call/fax pharmacy to change medication.     Rachael Barrett MA on 4/17/2023 at 12:20 PM

## 2023-04-17 NOTE — TELEPHONE ENCOUNTER
Attempted to contact patient regarding upcoming vaccine appointment.    Please inform patient that [facility] does not have Moderna monovalent vaccine available, pfizer monovalent is available and can be received. Patient is also too early for appointment and will need to reschedule for 28 days or later for second vaccine.

## 2023-04-20 NOTE — TELEPHONE ENCOUNTER
Central Prior Authorization Team   Phone: [phone]    PA Initiation    Medication: amphetamine-dextroamphetamine (ADDERALL XR)  Insurance Company: [facility] - Phone [phone] Fax [phone]  Pharmacy Filling the Rx: [id number] - [place], [place] - [address] [place] [address] N AT [facility] [address]  Filling Pharmacy Phone: [phone]  Filling Pharmacy Fax:    Start Date: 4/20/2023

## 2023-04-24 NOTE — TELEPHONE ENCOUNTER
Prior Authorization Approval    Authorization Effective Date: 1/18/2023  Authorization Expiration Date: 4/18/2024  Medication: amphetamine-dextroamphetamine (ADDERALL XR)  Approved Dose/Quantity:    Reference #:     Insurance Company: [employer] [place] - Phone [phone] Fax [phone]  Expected CoPay:       CoPay Card Available:      Foundation Assistance Needed:    Which Pharmacy is filling the prescription (Not needed for infusion/clinic administered): [id number] - [place], [place] - [address] [place] [address] AT [facility] [address]  Pharmacy Notified: Yes-Pharmacy has a paid claim   Patient Notified: Yes

## 2023-05-04 NOTE — TELEPHONE ENCOUNTER
Medication: Adderall  Last Date Filled 9/23/21   pulled: PROVIDER TO PULL FROM Epic.     Only PCP Prescribing? PROVIDER TO PULL  FROM Epic.  Taken as prescribed from physician notes? YES    CSA in last year: YES  Random Utox in last year: NO  (if any of the above answer NO - schedule with PCP)     Opioids + benzodiazepines? NO  (if the above answer YES - schedule with PCP every 6 months)     Is patient on the Executive Review Team? no      All responses suggest: Refilling prescription         Calm

## 2023-05-14 ENCOUNTER — HEALTH MAINTENANCE LETTER (OUTPATIENT)
Age: 68
End: 2023-05-14

## 2023-05-16 DIAGNOSIS — F98.8 ATTENTION DEFICIT DISORDER (ADD) WITHOUT HYPERACTIVITY: ICD-10-CM

## 2023-05-16 RX ORDER — DEXTROAMPHETAMINE SACCHARATE, AMPHETAMINE ASPARTATE MONOHYDRATE, DEXTROAMPHETAMINE SULFATE AND AMPHETAMINE SULFATE 5; 5; 5; 5 MG/1; MG/1; MG/1; MG/1
40 CAPSULE, EXTENDED RELEASE ORAL EVERY MORNING
Qty: 60 CAPSULE | Refills: 0 | Status: SHIPPED | OUTPATIENT
Start: 2023-05-16 | End: 2023-06-13

## 2023-05-26 ENCOUNTER — ALLIED HEALTH/NURSE VISIT (OUTPATIENT)
Dept: FAMILY MEDICINE | Facility: CLINIC | Age: 68
End: 2023-05-26

## 2023-05-26 ENCOUNTER — VIRTUAL VISIT (OUTPATIENT)
Dept: INTERNAL MEDICINE | Facility: CLINIC | Age: 68
End: 2023-05-26
Payer: COMMERCIAL

## 2023-05-26 DIAGNOSIS — Z23 ENCOUNTER FOR IMMUNIZATION: Primary | ICD-10-CM

## 2023-05-26 DIAGNOSIS — F30.9 BIPOLAR I DISORDER, SINGLE MANIC EPISODE (H): ICD-10-CM

## 2023-05-26 DIAGNOSIS — Z12.11 SCREEN FOR COLON CANCER: ICD-10-CM

## 2023-05-26 DIAGNOSIS — Z86.19 HISTORY OF HEPATITIS C VIRUS INFECTION: ICD-10-CM

## 2023-05-26 DIAGNOSIS — M25.512 ACUTE PAIN OF LEFT SHOULDER: Primary | ICD-10-CM

## 2023-05-26 DIAGNOSIS — I10 ESSENTIAL HYPERTENSION: ICD-10-CM

## 2023-05-26 DIAGNOSIS — F10.11 ALCOHOL ABUSE, IN REMISSION: ICD-10-CM

## 2023-05-26 PROBLEM — N18.30 CHRONIC KIDNEY DISEASE, STAGE 3 (H): Status: RESOLVED | Noted: 2021-09-02 | Resolved: 2023-05-26

## 2023-05-26 PROCEDURE — 99207 PR NO CHARGE NURSE ONLY: CPT

## 2023-05-26 PROCEDURE — 99215 OFFICE O/P EST HI 40 MIN: CPT | Mod: VID | Performed by: INTERNAL MEDICINE

## 2023-05-26 PROCEDURE — 0134A COVID-19 BIVALENT 18+ (MODERNA): CPT

## 2023-05-26 PROCEDURE — 91313 COVID-19 BIVALENT 18+ (MODERNA): CPT

## 2023-05-26 RX ORDER — LOSARTAN POTASSIUM AND HYDROCHLOROTHIAZIDE 25; 100 MG/1; MG/1
1 TABLET ORAL DAILY
Qty: 90 TABLET | Refills: 3 | Status: SHIPPED | OUTPATIENT
Start: 2023-05-26 | End: 2024-04-19

## 2023-05-26 RX ORDER — PREDNISONE 20 MG/1
20 TABLET ORAL EVERY MORNING
Qty: 4 TABLET | Refills: 0 | Status: SHIPPED | OUTPATIENT
Start: 2023-05-26 | End: 2023-05-30

## 2023-05-26 RX ORDER — MELOXICAM 15 MG/1
15 TABLET ORAL DAILY
Qty: 30 TABLET | Refills: 11 | Status: SHIPPED | OUTPATIENT
Start: 2023-05-26 | End: 2023-10-31

## 2023-05-26 RX ORDER — GABAPENTIN 300 MG/1
300 CAPSULE ORAL 2 TIMES DAILY
Qty: 60 CAPSULE | Refills: 11 | Status: SHIPPED | OUTPATIENT
Start: 2023-05-26 | End: 2023-05-31 | Stop reason: SINTOL

## 2023-05-26 NOTE — PROGRESS NOTES
During nurse visit, patient stated he thought he was going to see his PCP at todays visit and wanted to tell him about his left shoulder pain he was experiencing.    Patient stated that this was not new pain and is the result of a previous injury from a while ago and is now having flare ups that started approximately two weeks ago. Patient states pain resolves with movement of arm/shoulder.     Writer stated a message could be sent to PCP, but that PCP was out of office until next week. Patient stated he wanted to talk to someone sooner. No in person visits at clinic. Patient assisted in scheduling a virtual visit and was informed that they could be seen in urgent care or call [phone] to see if there are other in person visits within the system the patient could be scheduled at.     Patient verbalized understanding and stated he has a hospital near his home that he could get to if he felt he needed to be seen sooner or after virtual visit.

## 2023-05-26 NOTE — PROGRESS NOTES
Socrates is a 68 year old male contacting the clinic today via video, who will use the platform: [employer] for the visit.  Phone # for [employer], or if Amwell drops:   Telephone Information:   Mobile [phone]          ASSESSMENT and PLAN:  1. Acute pain of left shoulder  Consider crystalline arthropathy, rotator cuff injury, or cervical radiculopathy.  1 week course of medical treatment.  Either repeat or if no improvement referral to orthopedics.  Consider physical therapy  - predniSONE (DELTASONE) 20 MG tablet; Take 1 tablet (20 mg) by mouth every morning for 4 days  Dispense: 4 tablet; Refill: 0  - meloxicam (MOBIC) 15 MG tablet; Take 1 tablet (15 mg) by mouth daily  Dispense: 30 tablet; Refill: 11  - gabapentin (NEURONTIN) 300 MG capsule; Take 1 capsule (300 mg) by mouth 2 times daily  Dispense: 60 capsule; Refill: 11  - Orthopedic  Referral; Future    2. Screen for colon cancer  Not addressed    3. Bipolar I disorder, single manic episode (H)  Noted, on Seroquel.  No alcohol    4. Alcohol Abuse - In Remission  As above    5. History of hepatitis C virus infection  Chart corrected.  Hepatitis C  treated through gastroenterology.  Antibodies positive but hepatitis C viral load negative as it should be    6. Essential hypertension  Stable.  Refill.  Increases chance of gout  - losartan-hydrochlorothiazide (HYZAAR) 100-25 MG tablet; Take 1 tablet by mouth daily  Dispense: 90 tablet; Refill: 3       Patient Instructions   Prednisone 20 mg daily for 4 days    Meloxicam 15 mg daily 1 to 2 weeks    Gabapentin 300 mg twice daily    Refill losartan/HCTZ    Consider uric acid or calcium arthropathy    If no improvement referral to orthopedics    History of hepatitis C treatment reviewed            Return in about 6 months (around 11/26/2023) for using a video visit.       CHIEF COMPLAINT:  Chief Complaint   Patient presents with     Shoulder Injury     Left pain, was injured in 2003-flared up, makes it hard to ride  his bike and sit       HISTORY OF PRESENT ILLNESS:  Socrates is a 68 year old male contacting the clinic today via video for complaints of left shoulder pain.  ADD and bipolar makes history somewhat difficult.  He reports an original injury approximately 20 years ago, but a flareup of this left shoulder injury 3 to 4 weeks ago.  Pain occurs mostly when he sits and leans back and feels it in his left scapula.  There is no pain or tenderness with full range of motion of his left and right shoulder.  No pain with turning, flexing or extending neck but he does feel pain in his low cervical high thoracic area.  No specific trauma or injury recently but different types of trauma years ago when he was drinking alcohol.  Currently sober.  No new medication.  No rash, bruise, or swelling of the shoulder    History of hepatitis C treated through gastroenterology.  Chart corrected    Blood pressure stable and needs refill of losartan HCTZ    History of Present Illness       Reason for visit:  Shoulder pain  Symptom onset:  More than a month  Symptoms include:  Left shoulder, tenderness  Symptom intensity:  Moderate  Symptom progression:  Improving  Had these symptoms before:  Yes  Has tried/received treatment for these symptoms:  No  What makes it worse:  Sittng sometines, riding bike  What makes it better:  600 mg ibprofren    He eats 2-3 servings of fruits and vegetables daily.He consumes 2 sweetened beverage(s) daily.He exercises with enough effort to increase his heart rate 20 to 29 minutes per day.  He exercises with enough effort to increase his heart rate 4 days per week.   He is taking medications regularly.      REVIEW OF SYSTEMS:   No history of gout.  No peripheral edema    PFSH:  Social History     Social History Narrative    Retired.  Worked , loading semi-trucks, and at the [employer].  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  No children.    "Likes bicycling.          TOBACCO USE:  History   Smoking Status     Every Day     Packs/day: 0.25     Types: Cigarettes   Smokeless Tobacco     Never     Comment: 5 cigarettes a day       VITALS:  There were no vitals filed for this visit.  There were no vitals taken for this visit. Estimated body mass index is 27.17 kg/m  as calculated from the following:    Height as of 3/20/23: 1.753 m (5' 9\").    Weight as of 3/20/23: 83.5 kg (184 lb).    PHYSICAL EXAM:  (observations via Video)  Tangential.  Slightly anxious.  Able to move shoulder with full range of motion.  No erythema, tattoos or scars.  No bruising.  Interrupts frequently    MEDICATIONS:   Current Outpatient Medications   Medication Sig Dispense Refill     amLODIPine (NORVASC) 5 MG tablet Take 1 tablet (5 mg) by mouth daily 30 tablet 6     amphetamine-dextroamphetamine (ADDERALL XR) 20 MG 24 hr capsule Take 2 capsules (40 mg) by mouth every morning 60 capsule 0     fluticasone propionate (FLONASE) 50 mcg/actuation nasal spray [FLUTICASONE PROPIONATE (FLONASE) 50 MCG/ACTUATION NASAL SPRAY] SHAKE LIQUID AND USE 2 SPRAYS IN EACH NOSTRIL DAILY 48 g 3     gabapentin (NEURONTIN) 300 MG capsule Take 1 capsule (300 mg) by mouth 2 times daily 60 capsule 11     ibuprofen (ADVIL/MOTRIN) 600 MG tablet TAKE 1 TABLET(600 MG) BY MOUTH EVERY 8 HOURS AS NEEDED FOR MODERATE PAIN 90 tablet 3     loratadine (CLARITIN) 10 MG tablet TAKE 1 TABLET(10 MG) BY MOUTH DAILY 30 tablet 11     losartan-hydrochlorothiazide (HYZAAR) 100-25 MG tablet Take 1 tablet by mouth daily 90 tablet 3     meloxicam (MOBIC) 15 MG tablet Take 1 tablet (15 mg) by mouth daily 30 tablet 11     metoprolol succinate ER (TOPROL XL) 50 MG 24 hr tablet Take 1 tablet (50 mg) by mouth daily 90 tablet 3     nicotine polacrilex (NICORETTE) 4 MG gum [NICOTINE POLACRILEX (NICORETTE) 4 MG GUM] Apply 1 each (4 mg total) to the mouth or throat as needed for smoking cessation. 160 each 5     predniSONE " (DELTASONE) 20 MG tablet Take 1 tablet (20 mg) by mouth every morning for 4 days 4 tablet 0     QUEtiapine (SEROQUEL) 50 MG tablet TAKE 1-2 TABLETS BY MOUTH EVERY NIGHT AT BEDTIME 60 tablet 11       Outside Notes summarized:   Labs, x-rays, cardiology, GI tests reviewed:   Recent Labs   Lab Test 03/20/23  1325 08/30/21  1056 08/30/21  1055   HGB 16.4 15.1  --    WBC 6.4 6.8  --     143  --    POTASSIUM 4.7 4.1  --    CR 1.29* 1.46*  --    A1C  --  5.7*  --    PSA  --   --  0.59     No results found for: [id number]  Lab Results   Component Value Date    CHOL 273 03/20/2023     New orders:   Orders Placed This Encounter   Procedures     Orthopedic  Referral       Independent review of:     Kodi Tejada MD  [facility]    Video-Visit Details  Type of service:  Video Visit  Patient has given verbal consent to a Video visit?  Yes  How would you like to obtain your AVS?  MyChart  Will anyone else be joining your video visit, giving supplemental history? No    Originating location (pt location): Home    Distant Location (provider location):  Off-site    Video Start Time: 2:33 PM  Video End time:  3:11 PM  Face to face plus orders:  38 minutes  Documentation time:  3 minutes     The visit lasted a total of 41 minutes

## 2023-05-26 NOTE — PROGRESS NOTES
Clinic Administered Medication Documentation      Injectable Medication Documentation    Is there an active order (written within the past 365 days, with administrations remaining, not ) in the chart? Yes.     Patient was given Moderna Bivalent . Prior to medication administration, verified patient's identity using patient s name and date of birth. Please see MAR and medication order for additional information. Patient instructed to remain in clinic for 15 minutes and report any adverse reaction to staff immediately.    Vial/Syringe: Multi dose vial  Was this medication supplied by the patient? No  Is this a medication the patient will need to receive again? No - not necessary to check for refills remaining.       Jose Francisco

## 2023-05-26 NOTE — PATIENT INSTRUCTIONS
Prednisone 20 mg daily for 4 days    Meloxicam 15 mg daily 1 to 2 weeks    Gabapentin 300 mg twice daily    Refill losartan/HCTZ    Consider uric acid or calcium arthropathy    If no improvement referral to orthopedics    History of hepatitis C treatment reviewed

## 2023-05-30 ENCOUNTER — NURSE TRIAGE (OUTPATIENT)
Dept: INTERNAL MEDICINE | Facility: CLINIC | Age: 68
End: 2023-05-30
Payer: COMMERCIAL

## 2023-05-30 NOTE — TELEPHONE ENCOUNTER
Provider Recommendation Follow Up:   Reached patient/caregiver. Informed of provider's recommendations. Patient verbalized understanding and does not agree with the plan.    Pt plans to stop taking the gabapentin, I tried to reassure patient that this may be a short-term side effect and provide patient with the phone number for orthopedic follow-up if he would like to pursue that option. Pt declined phone number at this time and said that he would like to discuss further with Dr. Tejada tomorrow morning during his scheduled telephone visit.

## 2023-05-30 NOTE — TELEPHONE ENCOUNTER
"Nurse Triage SBAR    Is this a 2nd Level Triage? NO    Situation: Ankle swellin. LOCATION:    Bilateral   2. ONSET: started on Saturday   3. SWELLING:     - MILD: Joint looks or feels mildly swollen or puffy.   4. PAIN:    - MILD (3): doesn't interfere with normal activities.   5. CAUSE:   Pt thinks this may be related to starting the gabapentin. Pt has not taken gabapentin in the past.  6. OTHER SYMPTOMS: Pain on left side of lower back (on-going, not new) Does not report other symptoms.    Background: Pt was seen by Dr. Tejada on 23 for a shoulder injury. Pt started taking prednisone, meloxicam 15 mg, gabapentin 300 mg. Medications were given for patient's back, pt is still \"having problems with my back.\" However pt also reports that the pain has improved in his back.     Assessment: Pt denies chest pain, fever, unilateral swelling. Pt can still participate in daily activities. Pt believes that gabapentin may be causing the swelling.     Protocol Recommended Disposition:   See in Office Today or Tomorrow    Recommendation: Recommendation for change in medications/stop gabapentin? Pt was given a referral to orthopedics, recommend to stop gabapentin and schedule appointment with orthopedics if pain worsens / does not continue to improve with meloxicam / prednisone?     Routed to provider    Information from orthopedics referral (to give to patient on return phone call):   Please be aware that coverage of these services is subject to the terms and limitations of your health insurance plan.  Call member services at your health plan with any benefit or coverage questions.  [facility]  will call you to coordinate your care as prescribed by your provider. A representative will call you within 2 business days to help you schedule your appointment, or you may contact the  Representative at: [phone].    Reason for Disposition    Patient wants to be " seen    Additional Information    Negative: Chest pain    Negative: Followed an ankle injury    Negative: Followed a bee sting and has localized swelling (e.g., small area of puffy or swollen skin)    Negative: Followed an insect bite and has localized swelling (e.g., small area of puffy or swollen skin)    Negative: Ankle pain is main symptom    Negative: Swelling of both ankles (i.e., pedal edema)    Negative: Swelling of calf or leg is main symptom    Negative: Difficulty breathing    Negative: Entire foot is cool or blue in comparison to other side    Negative: Ankle pain and fever    Negative: Ankle redness and fever    Negative: Patient sounds very sick or weak to the triager    Negative: SEVERE pain (e.g., excruciating, unable to walk) and not improved after 2 hours of pain medicine    Negative: Redness and painful when touched and no fever    Negative: Red area or streak > 2 inches (or 5 cm)    Negative: Thigh or calf pain and only 1 side and present > 1 hour    Negative: Thigh, calf, or ankle swelling and only 1 side    Negative: Thigh, calf, or ankle swelling and bilateral and 1 side is more swollen    Negative: SEVERE swelling (e.g., can't move swollen ankle at all)    Negative: Looks like a boil, infected sore, deep ulcer or other infected rash (spreading redness, pus)    Negative: MILD to MODERATE ankle swelling (e.g., can't move joint normally, can't do usual activities) (Exceptions: Itchy, localized swelling; swelling is chronic.)    Negative: Ankle swelling is a chronic symptom (recurrent or ongoing AND present > 4 weeks)    Negative: LOCALIZED swelling (e.g., small area of puffy or swollen skin) that is itchy    Answer Assessment - Initial Assessment Questions  1. LOCATION:    Bilateral   2. ONSET: started on Saturday   3. SWELLING:     - MILD: Joint looks or feels mildly swollen or puffy.   4. PAIN:    - MILD (3): doesn't interfere with normal activities.   5. CAUSE:   Pt thinks this may be related  to starting the gabapentin. Pt has not taken gabapentin in the past.  6. OTHER SYMPTOMS: Pain on left side of lower back (on-going, not new) Does not report other symptoms.    Protocols used: ANKLE SWELLING-A-OH

## 2023-05-30 NOTE — TELEPHONE ENCOUNTER
If the swelling is relatively minimal, and in both ankles he may continue the gabapentin and tolerate this as a short-term side effect    If the swelling is more bothersome he may discontinue the gabapentin    No need for office visit

## 2023-05-31 ENCOUNTER — VIRTUAL VISIT (OUTPATIENT)
Dept: INTERNAL MEDICINE | Facility: CLINIC | Age: 68
End: 2023-05-31
Payer: COMMERCIAL

## 2023-05-31 DIAGNOSIS — Z12.11 SCREEN FOR COLON CANCER: ICD-10-CM

## 2023-05-31 DIAGNOSIS — F10.21 ALCOHOL DEPENDENCE IN REMISSION (H): ICD-10-CM

## 2023-05-31 DIAGNOSIS — Z00.00 PREVENTATIVE HEALTH CARE: ICD-10-CM

## 2023-05-31 DIAGNOSIS — M25.512 ACUTE PAIN OF LEFT SHOULDER: ICD-10-CM

## 2023-05-31 DIAGNOSIS — M54.2 CERVICALGIA: Primary | ICD-10-CM

## 2023-05-31 PROCEDURE — 99442 PR PHYSICIAN TELEPHONE EVALUATION 11-20 MIN: CPT | Mod: 93 | Performed by: INTERNAL MEDICINE

## 2023-05-31 RX ORDER — AMITRIPTYLINE HYDROCHLORIDE 10 MG/1
10 TABLET ORAL AT BEDTIME
Qty: 30 TABLET | Refills: 11 | Status: SHIPPED | OUTPATIENT
Start: 2023-05-31 | End: 2024-02-07

## 2023-05-31 RX ORDER — BACLOFEN 10 MG/1
10 TABLET ORAL 3 TIMES DAILY PRN
Qty: 20 TABLET | Refills: 1 | Status: SHIPPED | OUTPATIENT
Start: 2023-05-31 | End: 2024-02-07

## 2023-05-31 NOTE — PROGRESS NOTES
Socrates is a 68 year old male being evaluated via a billable phone visit, and would like to be contacted via the following  Home number [phone] (home)    ASSESSMENT and PLAN:  1. Cervicalgia  Differential between shoulder and neck but suspect neck.  Begin therapy.  Trial of amitriptyline and baclofen.  Gabapentin with side effects.  If no improvement referral to spine clinic.  If shoulder referral to orthopedics  - baclofen (LIORESAL) 10 MG tablet; Take 1 tablet (10 mg) by mouth 3 times daily as needed for muscle spasms  Dispense: 20 tablet; Refill: 1  - amitriptyline (ELAVIL) 10 MG tablet; Take 1 tablet (10 mg) by mouth At Bedtime  Dispense: 30 tablet; Refill: 11  - Spine  Referral; Future  - Physical Therapy Referral; Future    2. Acute pain of left shoulder  Suspect radiating from neck.  Physical therapy to treat and help focus  - Physical Therapy Referral; Future    3. Screen for colon cancer  Has Cologuard.  Encourage completion    4. Alcohol dependence in remission (H)  Confirmed today    5. Preventative health care  - REVIEW OF HEALTH MAINTENANCE PROTOCOL ORDERS       Patient Instructions   Discontinue gabapentin-    Finish prednisone-done    Meloxicam or ibuprofen but not both    Amitriptyline 10 mg at bed    Baclofen 10 mg every 8 hours as needed    Referral to physical therapy    If no improvement referral to spine clinic    Encourage Cologuard            Return in about 6 months (around 11/30/2023) for using a video visit.         CHIEF COMPLAINT:  Chief Complaint   Patient presents with     office visit     Recheck Medication     Pt reports that he would like to talk about medications,left pain,toe pain on left foot.       HISTORY OF PRESENT ILLNESS:  Socrates is a 68 year old male contacting the clinic today via phone for follow-up of neck and shoulder pain.  We spoke on May 26.  Although initial history suggested shoulder, further exploration suggested this might be radicular neck pain.   "Prednisone was ineffective.  Gabapentin caused fatigue and leg swelling neither was it effective.  Meloxicam helps a little bit and he continues this sometimes with ibuprofen.  Upon further reflection of the differential options he does think it radiates from his neck.  He sleeps well using Seroquel and has never taken amitriptyline.  He does think there might be some muscle spasm.  We talked about physical therapy or medications or both.  He does not drive and would find it difficult to get to [facility]    REVIEW OF SYSTEMS:  Good sleep on Seroquel    PFSH:  Social History     Social History Narrative    Retired.  Worked , loading semi-trucks, and at the [employer].  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  No children.   Likes bicycling.      Does not drive    TOBACCO USE:  History   Smoking Status     Every Day     Packs/day: 0.25     Types: Cigarettes   Smokeless Tobacco     Never     Comment: 5 cigarettes a day       VITALS:  There were no vitals filed for this visit.  There were no vitals taken for this visit. Estimated body mass index is 27.17 kg/m  as calculated from the following:    Height as of 3/20/23: 1.753 m (5' 9\").    Weight as of 3/20/23: 83.5 kg (184 lb).    PHYSICAL EXAM:  (observations via Phone)  Alert and oriented.  More focused this week    MEDICATIONS  Current Outpatient Medications   Medication Sig Dispense Refill     amitriptyline (ELAVIL) 10 MG tablet Take 1 tablet (10 mg) by mouth At Bedtime 30 tablet 11     amLODIPine (NORVASC) 5 MG tablet Take 1 tablet (5 mg) by mouth daily 30 tablet 6     amphetamine-dextroamphetamine (ADDERALL XR) 20 MG 24 hr capsule Take 2 capsules (40 mg) by mouth every morning 60 capsule 0     baclofen (LIORESAL) 10 MG tablet Take 1 tablet (10 mg) by mouth 3 times daily as needed for muscle spasms 20 tablet 1     fluticasone propionate (FLONASE) 50 mcg/actuation nasal spray [FLUTICASONE " PROPIONATE (FLONASE) 50 MCG/ACTUATION NASAL SPRAY] SHAKE LIQUID AND USE 2 SPRAYS IN EACH NOSTRIL DAILY 48 g 3     ibuprofen (ADVIL/MOTRIN) 600 MG tablet TAKE 1 TABLET(600 MG) BY MOUTH EVERY 8 HOURS AS NEEDED FOR MODERATE PAIN 90 tablet 3     loratadine (CLARITIN) 10 MG tablet TAKE 1 TABLET(10 MG) BY MOUTH DAILY 30 tablet 11     losartan-hydrochlorothiazide (HYZAAR) 100-25 MG tablet Take 1 tablet by mouth daily 90 tablet 3     meloxicam (MOBIC) 15 MG tablet Take 1 tablet (15 mg) by mouth daily 30 tablet 11     metoprolol succinate ER (TOPROL XL) 50 MG 24 hr tablet Take 1 tablet (50 mg) by mouth daily 90 tablet 3     nicotine polacrilex (NICORETTE) 4 MG gum [NICOTINE POLACRILEX (NICORETTE) 4 MG GUM] Apply 1 each (4 mg total) to the mouth or throat as needed for smoking cessation. 160 each 5     QUEtiapine (SEROQUEL) 50 MG tablet TAKE 1-2 TABLETS BY MOUTH EVERY NIGHT AT BEDTIME 60 tablet 11       Notes summarized:   Labs, x-rays, cardiology, GI tests reviewed:   Recent Labs   Lab Test 03/20/23  1325 08/30/21  1056 08/30/21  1055   HGB 16.4 15.1  --    WBC 6.4 6.8  --     143  --    POTASSIUM 4.7 4.1  --    CR 1.29* 1.46*  --    A1C  --  5.7*  --    PSA  --   --  0.59     No results found for: [id number]  Lab Results   Component Value Date    CHOL 273 03/20/2023     New orders:   Orders Placed This Encounter   Procedures     REVIEW OF HEALTH MAINTENANCE PROTOCOL ORDERS     Spine  Referral     Physical Therapy Referral       Independent review of:    Patient would like to receive their AVS by Carl Tejada MD  [facility]    Phone-Visit Details  Type of service:  Phone Visit  Patient has given verbal consent to a Phone visit?  Yes  How would you like to obtain your AVS?  Carl  Will anyone else be joining your phone visit, giving supplemental history? No    Originating location (pt location): Home    Distant Location (provider location):  Off-site    Phone  Start Time: 10:48 AM  Phone End time:  11:05 AM  Conversation plus orders: 17 minutes  Dictation time:  3 minutes    The visit lasted a total of 20 minutes

## 2023-05-31 NOTE — PATIENT INSTRUCTIONS
Discontinue gabapentin-    Finish prednisone-done    Meloxicam or ibuprofen but not both    Amitriptyline 10 mg at bed    Baclofen 10 mg every 8 hours as needed    Referral to physical therapy    If no improvement referral to spine clinic    Encourage Cologkai

## 2023-05-31 NOTE — PROGRESS NOTES
"Socrates is a 68 year old who is being evaluated via a billable telephone visit.      What phone number would you like to be contacted at? [phone]  How would you like to obtain your AVS? Mail a copy  {PROVIDER LOCATION On-site should be selected for visits conducted from your clinic location or adjoining [facility] hospital, academic office, or other nearby [facility] building. Off-site should be selected for all other provider locations, including home:058508}  Distant Location (provider location):  {virtual location provider:485888}    {PROVIDER CHARTING PREFERENCE:920300}    Subjective   Socrates is a 68 year old, presenting for the following health issues:  office visit and Recheck Medication (Pt reports that he would like to talk about medications,left pain,toe pain on left foot.)        5/31/2023    10:07 AM   Additional Questions   Roomed by amy   Accompanied by alone         5/31/2023    10:07 AM   Patient Reported Additional Medications   Patient reports taking the following new medications meloxicom     HPI     Pt reports that he is having this visit for left shoulder pain, recheck medications, left toe pain.  {additonal problems for provider to add (Optional):212393}      Review of Systems   {ROS COMP (Optional):772657}      Objective           Vitals:  No vitals were obtained today due to virtual visit.    Physical Exam   {GENERAL APPEARANCE:50::\"healthy\",\"alert\",\"no distress\"}  PSYCH: Alert and oriented times 3; coherent speech, normal   rate and volume, able to articulate logical thoughts, able   to abstract reason, no tangential thoughts, no hallucinations   or delusions  His affect is { :6204392::\"normal\"}  RESP: No cough, no audible wheezing, able to talk in full sentences  Remainder of exam unable to be completed due to telephone visits    {Diagnostic Test Results (Optional):885362}    {AMBULATORY ATTESTATION (Optional):445393}        Phone call duration: *** minutes    "

## 2023-06-06 ENCOUNTER — TELEPHONE (OUTPATIENT)
Dept: INTERNAL MEDICINE | Facility: CLINIC | Age: 68
End: 2023-06-06
Payer: COMMERCIAL

## 2023-06-06 NOTE — CONFIDENTIAL NOTE
As per my note, if the medicines are ineffective and his pain persists he should go to the spine clinic.  That referral was placed May 31

## 2023-06-06 NOTE — TELEPHONE ENCOUNTER
General Call    Contacts       Type Contact Phone/Fax    06/06/2023 10:07 AM CDT Phone (Incoming) Socrates Paez (Self) [phone] (H)        Reason for Call:  Request to update Dr. Tejada    What are your questions or concerns:  Patient would like to inform Dr. Tejada that he has stopped taking the Baclofen and Amitriptyline that they agreed for the patient to try.    Patient states his right foot was swelling and that his shoulder was getting better, and the patient feels this is because he stopped taking the medication.    Date of last appointment with provider: 5/31/23     would you prefer to receive a phone call?:   Patient would prefer a phone call     Okay to leave a detailed message?: Yes at Home number on file [phone] (home)

## 2023-06-12 DIAGNOSIS — F98.8 ATTENTION DEFICIT DISORDER (ADD) WITHOUT HYPERACTIVITY: ICD-10-CM

## 2023-06-13 RX ORDER — DEXTROAMPHETAMINE SACCHARATE, AMPHETAMINE ASPARTATE MONOHYDRATE, DEXTROAMPHETAMINE SULFATE AND AMPHETAMINE SULFATE 5; 5; 5; 5 MG/1; MG/1; MG/1; MG/1
40 CAPSULE, EXTENDED RELEASE ORAL EVERY MORNING
Qty: 60 CAPSULE | Refills: 0 | Status: SHIPPED | OUTPATIENT
Start: 2023-06-13 | End: 2023-07-11

## 2023-07-10 DIAGNOSIS — F98.8 ATTENTION DEFICIT DISORDER (ADD) WITHOUT HYPERACTIVITY: ICD-10-CM

## 2023-07-11 RX ORDER — DEXTROAMPHETAMINE SACCHARATE, AMPHETAMINE ASPARTATE MONOHYDRATE, DEXTROAMPHETAMINE SULFATE AND AMPHETAMINE SULFATE 5; 5; 5; 5 MG/1; MG/1; MG/1; MG/1
40 CAPSULE, EXTENDED RELEASE ORAL EVERY MORNING
Qty: 60 CAPSULE | Refills: 0 | Status: SHIPPED | OUTPATIENT
Start: 2023-07-11 | End: 2023-08-09

## 2023-08-01 DIAGNOSIS — I10 HYPERTENSION: ICD-10-CM

## 2023-08-01 RX ORDER — AMLODIPINE BESYLATE 5 MG/1
5 TABLET ORAL DAILY
Qty: 90 TABLET | Refills: 3 | Status: SHIPPED | OUTPATIENT
Start: 2023-08-01 | End: 2024-07-30

## 2023-08-01 NOTE — TELEPHONE ENCOUNTER
"Routing refill request to provider for review/approval because:  Labs out of range:  creatinine    Last Written Prescription Date:  1/2/23  Last Fill Quantity: 30,  # refills: 6   Last office visit provider:   5/31/23 with chantelle    Requested Prescriptions   Pending Prescriptions Disp Refills    amLODIPine (NORVASC) 5 MG tablet 30 tablet 6     Sig: Take 1 tablet (5 mg) by mouth daily       Calcium Channel Blockers Protocol  Failed - 8/1/2023  4:20 PM        Failed - Normal serum creatinine on file in past 12 months     Recent Labs   Lab Test 03/20/23  1325   CR 1.29*       Ok to refill medication if creatinine is low          Passed - Blood pressure under 140/90 in past 12 months     BP Readings from Last 3 Encounters:   03/20/23 122/84   08/30/21 124/80   01/31/20 132/84                 Passed - Recent (12 mo) or future (30 days) visit within the authorizing provider's specialty     Patient has had an office visit with the authorizing provider or a provider within the authorizing providers department within the previous 12 mos or has a future within next 30 days. See \"Patient Info\" tab in inbasket, or \"Choose Columns\" in Meds & Orders section of the refill encounter.              Passed - Medication is active on med list        Passed - Patient is age 18 or older             NEHEMIAH STRANGE RN 08/01/23 4:20 PM  "

## 2023-08-09 DIAGNOSIS — F98.8 ATTENTION DEFICIT DISORDER (ADD) WITHOUT HYPERACTIVITY: ICD-10-CM

## 2023-08-09 RX ORDER — DEXTROAMPHETAMINE SACCHARATE, AMPHETAMINE ASPARTATE MONOHYDRATE, DEXTROAMPHETAMINE SULFATE AND AMPHETAMINE SULFATE 5; 5; 5; 5 MG/1; MG/1; MG/1; MG/1
40 CAPSULE, EXTENDED RELEASE ORAL EVERY MORNING
Qty: 60 CAPSULE | Refills: 0 | Status: SHIPPED | OUTPATIENT
Start: 2023-08-09 | End: 2023-09-06

## 2023-09-06 DIAGNOSIS — F98.8 ATTENTION DEFICIT DISORDER (ADD) WITHOUT HYPERACTIVITY: ICD-10-CM

## 2023-09-06 RX ORDER — DEXTROAMPHETAMINE SACCHARATE, AMPHETAMINE ASPARTATE MONOHYDRATE, DEXTROAMPHETAMINE SULFATE AND AMPHETAMINE SULFATE 5; 5; 5; 5 MG/1; MG/1; MG/1; MG/1
40 CAPSULE, EXTENDED RELEASE ORAL EVERY MORNING
Qty: 60 CAPSULE | Refills: 0 | Status: SHIPPED | OUTPATIENT
Start: 2023-09-06 | End: 2023-10-02

## 2023-10-02 DIAGNOSIS — F98.8 ATTENTION DEFICIT DISORDER (ADD) WITHOUT HYPERACTIVITY: ICD-10-CM

## 2023-10-02 RX ORDER — DEXTROAMPHETAMINE SACCHARATE, AMPHETAMINE ASPARTATE MONOHYDRATE, DEXTROAMPHETAMINE SULFATE AND AMPHETAMINE SULFATE 5; 5; 5; 5 MG/1; MG/1; MG/1; MG/1
40 CAPSULE, EXTENDED RELEASE ORAL EVERY MORNING
Qty: 60 CAPSULE | Refills: 0 | Status: SHIPPED | OUTPATIENT
Start: 2023-10-02 | End: 2023-10-31

## 2023-10-15 ENCOUNTER — HEALTH MAINTENANCE LETTER (OUTPATIENT)
Age: 68
End: 2023-10-15

## 2023-10-17 ENCOUNTER — LAB (OUTPATIENT)
Dept: INTERNAL MEDICINE | Facility: CLINIC | Age: 68
End: 2023-10-17

## 2023-10-17 ENCOUNTER — OFFICE VISIT (OUTPATIENT)
Dept: INTERNAL MEDICINE | Facility: CLINIC | Age: 68
End: 2023-10-17
Payer: COMMERCIAL

## 2023-10-17 ENCOUNTER — ANCILLARY PROCEDURE (OUTPATIENT)
Dept: GENERAL RADIOLOGY | Facility: CLINIC | Age: 68
End: 2023-10-17
Attending: INTERNAL MEDICINE
Payer: COMMERCIAL

## 2023-10-17 DIAGNOSIS — Z12.11 SCREEN FOR COLON CANCER: ICD-10-CM

## 2023-10-17 DIAGNOSIS — M16.12 PRIMARY OSTEOARTHRITIS OF LEFT HIP: ICD-10-CM

## 2023-10-17 DIAGNOSIS — Z12.11 SCREEN FOR COLON CANCER: Primary | ICD-10-CM

## 2023-10-17 DIAGNOSIS — M25.552 HIP PAIN, LEFT: ICD-10-CM

## 2023-10-17 PROCEDURE — 73502 X-RAY EXAM HIP UNI 2-3 VIEWS: CPT | Mod: TC | Performed by: RADIOLOGY

## 2023-10-17 PROCEDURE — 99214 OFFICE O/P EST MOD 30 MIN: CPT | Performed by: INTERNAL MEDICINE

## 2023-10-17 NOTE — PROGRESS NOTES
ASSESSMENT AND PLAN:    1. Screen for colon cancer  He agrees to sending the cologuard - needs re-send.      2. Hip pain, left  Weight bearing mostly. Xray reveals moderate osteoarthritis.  He agrees to orthopedic referral.  He likely is candidate for MERARY.  We discussed this.   - XR Hip Left 2-3 Views; Future    Follow up in 6 months.     CHIEF COMPLAINT:  Chronic left hip pain    HISTORY OF PRESENT ILLNESS:  Socrates Paez is a 68 year old male with chronic left hip pain.  This has been a few years, worsening.  He feels it developed after a fall.  There is pain with weight bearing, and lying on that side at night.  No issues of leg weakness.  No voiding or bowel issues.     REVIEW OF SYSTEMS:   See HPI, all other systems on review are negative.    Past Medical History:   Diagnosis Date    ADHD (attention deficit hyperactivity disorder)     Alcoholism (H)     Bipolar disorder (H)     Chronic kidney disease, stage 3 (H) 2021    Chronic viral hepatitis C (H)     treated    Depression     Essential hypertension     Hyperlipidemia LDL goal <100     Psoriasis     Sleep disorder 3/20/2023    Tobacco dependence syndrome      History   Smoking Status    Every Day    Packs/day: 0.25    Types: Cigarettes   Smokeless Tobacco    Never     Comment: 5 cigarettes a day     Family History   Problem Relation Age of Onset    No Known Problems Mother         unknown cause age 53    Alcoholism Father     Cirrhosis Father          age 66    HIV/AIDS Brother         age 28    Throat cancer Brother         smoker     Past Surgical History:   Procedure Laterality Date    LIVER BIOPSY       VITALS:    Wt Readings from Last 3 Encounters:   23 83.5 kg (184 lb)   21 79.4 kg (175 lb 1.6 oz)   20 83.9 kg (185 lb)     PHYSICAL EXAM:  Constitutional:  In NAD, alert and oriented  Cardiac:  S1 S2   Lungs: Clear  Extremities: pain with external rotation of the left hip   Neurologic:  gait is antalgic with left hip pain       DECISION TO OBTAIN OLD RECORDS AND/OR OBTAIN HISTORY FROM SOMEONE OTHER THAN PATIENT, AND/OR ACCESSING CARE EVERYWHERE):  1  0     REVIEW AND SUMMARIZATION OF OLD RECORDS, AND/OR OBTAINING HISTORY FROM SOMEONE OTHER THAN PATIENT, AND/OR DISCUSSION OF CASE WITH ANOTHER HEALTH CARE PROVIDER:  2 reviewed past health notes.     REVIEW AND/OR ORDER OF OF CLINICAL LAB TESTS: 1  0.    REVIEW AND/OR ORDER OF RADIOLOGY TESTS: 1 ordered.    REVIEW AND/OR ORDER OF MEDICAL TESTS (EKG/ECHO/COLONOSCOPY/EGD): 1  cologuard ordered    INDEPENDENT  VISUALIZATION OF IMAGE, TRACING, OR SPECIMEN ITSELF (2 EACH):  2. Personally viewed and interpreted the hip xray and reviewed the films with the patient.      TOTAL: 6    Current Outpatient Medications   Medication Sig Dispense Refill    amitriptyline (ELAVIL) 10 MG tablet Take 1 tablet (10 mg) by mouth At Bedtime 30 tablet 11    amLODIPine (NORVASC) 5 MG tablet Take 1 tablet (5 mg) by mouth daily 90 tablet 3    amphetamine-dextroamphetamine (ADDERALL XR) 20 MG 24 hr capsule Take 2 capsules (40 mg) by mouth every morning 60 capsule 0    baclofen (LIORESAL) 10 MG tablet Take 1 tablet (10 mg) by mouth 3 times daily as needed for muscle spasms 20 tablet 1    fluticasone propionate (FLONASE) 50 mcg/actuation nasal spray [FLUTICASONE PROPIONATE (FLONASE) 50 MCG/ACTUATION NASAL SPRAY] SHAKE LIQUID AND USE 2 SPRAYS IN EACH NOSTRIL DAILY 48 g 3    ibuprofen (ADVIL/MOTRIN) 600 MG tablet TAKE 1 TABLET(600 MG) BY MOUTH EVERY 8 HOURS AS NEEDED FOR MODERATE PAIN 90 tablet 3    loratadine (CLARITIN) 10 MG tablet TAKE 1 TABLET(10 MG) BY MOUTH DAILY 30 tablet 11    losartan-hydrochlorothiazide (HYZAAR) 100-25 MG tablet Take 1 tablet by mouth daily 90 tablet 3    meloxicam (MOBIC) 15 MG tablet Take 1 tablet (15 mg) by mouth daily 30 tablet 11    metoprolol succinate ER (TOPROL XL) 50 MG 24 hr tablet Take 1 tablet (50 mg) by mouth daily 90 tablet 3    nicotine polacrilex (NICORETTE) 4 MG gum [NICOTINE  POLACRILEX (NICORETTE) 4 MG GUM] Apply 1 each (4 mg total) to the mouth or throat as needed for smoking cessation. 160 each 5    QUEtiapine (SEROQUEL) 50 MG tablet TAKE 1-2 TABLETS BY MOUTH EVERY NIGHT AT BEDTIME 60 tablet 11     Clyde Day MD  Internal Medicine  [facility]

## 2023-10-17 NOTE — COMMUNITY RESOURCES LIST (ENGLISH)
10/17/2023   [facility]  N/A  For questions about this resource list or additional care needs, please contact your primary care clinic or care manager.  Phone: [phone]   Email: N/A   Address: [address] [place] [address]   Hours: N/A        Food and Nutrition       Food pantry  1  [facility] Distance: 0.86 miles      [facility]   [address] [place] [address]  Language: English, Hmong, Jhonny, Hebrew  Hours: Mon 11:00 AM - 3:00 PM , Wed 11:00 AM - 3:00 PM , Fri 11:00 AM - 3:00 PM  Fees: Free, Self Pay   Phone: (134) 633-3260 Email: [email or web address] Website: http://[facility].org/[facility]/[address]/     2  [facility] Distance: 1.23 miles      [facility]   [address] [place] [address]  Language: English, Hebrew  Hours: Mon 9:00 AM - 6:00 PM Appt. Only, Tue - Fri 9:00 AM - 5:00 PM Appt. Only  Fees: Free   Phone: (641) 942-6168 Email: info@[employer].org Website: http://www.[employer].org/site     SNAP application assistance  3  [facility] Distance: 0.31 miles      In-Person, Phone/Virtual   [address] [place] [address] [place] [address]  Language: English  Hours: Mon - Fri 8:00 AM - 4:30 PM  Fees: Free   Phone: (569) 616-6662 Email: kaushikpdesk@[facility]ense.com. Website: https://www.[facility] Clare[facility]DoesThatMakeSense.com./[facility]-government/departments/health-and-wellness     4  [facility] (SNAP) Distance: 0.52 miles      Phone/Virtual   [address] [address] [place] [address]  Language: English, Hmong, Ukrainian, Citizen of the [place], Hebrew, Cameroonian  Hours: Mon - Fri 9:00 AM - 5:00 PM  Fees: Free   Phone: (175) 172-5517 Website: https://[place].gov/dhs/people-we-serve/adults/economic-assistance/food-nutrition/programs-and-services/supplemental-nutrition-assistance-program.jsp     Soup kitchen or free meals  5  [employer]  [facility] [place] [facility] Distance: 0.91 miles      In-Person   [address] [place] [address]  Language: English  Hours: Mon - Sun 6:30 AM - 7:30 AM , Mon - Sun 12:00 PM - 1:00 PM , Mon - Sun 5:30 PM - 6:30 PM  Fees: Free   Phone: (816) 350-9124 Email: info@[facility] Center[facility]oyd[facility]ff.com Website: https://[facility] CenterLloydgoff.com/about-us/contact/     6  [facility] [place] [place] Distance: 1.37 miles      [address] [address]  Language: English  Hours: Mon - Fri 5:00 PM - 6:00 PM  Fees: Free   Phone: (748) 148-2246 Email: pgrant@[facility] CampusUversity[facility] Website: http://www.[facility]Turnaround[facility] HospitalKlickEx/          Transportation       Free or low-cost transportation  7  [employer]  [facility] Circulator Bus Distance: 3.77 miles      In-Person   [address] [place] [address]  Language: English  Hours: Tue 9:00 AM - 2:00 PM  Fees: Self Pay   Phone: (528) 544-2487 Email: info@[employer] Website: http://www.[employer]/     8  [employer] Distance: 4.96 miles      In-Person   [address] [place] [address]  Language: English, Azeri  Hours: Mon 9:00 AM - 5:00 PM , Tue 9:30 AM - 7:00 PM , Wed 9:00 AM - 5:00 PM , Thu 9:30 AM - 7:00 PM , Fri 9:00 AM - 5:00 PM  Fees: Free   Phone: (914) 179-7580 Email: tess@[employer] Website: http://www.[employer]     Transportation to medical appointments  9  [facility] - Non-Emergency Medical Transportation Distance: 0.68 miles      In-Person   [address] [place] [address]  Language: English  Hours: Mon - Fri 8:00 AM - 4:00 PM Appt. Only  Fees: Self Pay   Phone: (114) 666-8439 Website: http://www.[facility].org/Medical-Services/Emergency-medical-services/Non-emergency-transportation/     10  [facility] Network for Seniors Distance: 4.04 miles      In-Person   [address] [place] [address]  Language: English  Hours: Mon - Fri 9:00 AM - 4:00 PM  Fees: Free   Phone: (850) 367-7728 Email:  abel@[employer].com Website: http://www.[facility] Divisionforseniors.org/          Important Numbers & Websites       Emergency Services   911  [facility] Services   311  Poison Control   (497) 953-9455  Suicide Prevention Lifeline   (517) 556-7178 (TALK)  Child Abuse Hotline   (567) 375-9605 (4-A-Child)  Sexual Assault Hotline   (940) 159-5191 (HOPE)  National Runaway Safeline   (756) 488-8330 (RUNAWAY)  All-Options Talkline   (106) 746-2596  Substance Abuse Referral   (576) 956-4235 (HELP)

## 2023-10-20 ENCOUNTER — TELEPHONE (OUTPATIENT)
Dept: INTERNAL MEDICINE | Facility: CLINIC | Age: 68
End: 2023-10-20

## 2023-10-20 NOTE — TELEPHONE ENCOUNTER
DIAGNOSIS: Hip pain, left, osteoarthritis - possibly needs MERARY  Clyde Day MD in [facility] INTERNAL MEDICINE  blueplus  xr    APPOINTMENT DATE: 10/23/23   NOTES STATUS DETAILS   OFFICE NOTE from referring provider Internal 10/17/23 - Clyde Day MD   MEDICATION LIST Internal    XRAYS  & INJECTIONS (IMAGES & REPORTS) Internal XR L HIP:  - 10/17/23    XR LUMBAR SPINE:  - 04/01/9

## 2023-10-20 NOTE — TELEPHONE ENCOUNTER
General Call    Contacts         Type Contact Phone/Fax    10/20/2023 12:13 PM CDT Phone (Incoming) Socrates Paez (Self) [phone] (H)          Reason for Call:  specialist concerns    What are your questions or concerns:  pt states he needs to know from Dr Day what to do-  He finally got scheduled for hip specialist on 10/24/23 but they will not let him be seen without making a payment and he does not have the money to do that.  He wants to know if Dr Day will tell him to cancel or not.  Insistent he want Dr Day to tell him what to do and wants a call back    Date of last appointment with provider: 10/17/23    Could we send this information to you in [facility] or would you prefer to receive a phone call?:   Patient would prefer a phone call   Okay to leave a detailed message?: Yes at Home number on file [phone] (home)

## 2023-10-23 ENCOUNTER — OFFICE VISIT (OUTPATIENT)
Dept: ORTHOPEDICS | Facility: CLINIC | Age: 68
End: 2023-10-23
Attending: INTERNAL MEDICINE
Payer: COMMERCIAL

## 2023-10-23 ENCOUNTER — PRE VISIT (OUTPATIENT)
Dept: ORTHOPEDICS | Facility: CLINIC | Age: 68
End: 2023-10-23

## 2023-10-23 DIAGNOSIS — M25.552 HIP PAIN, LEFT: ICD-10-CM

## 2023-10-23 PROCEDURE — 99203 OFFICE O/P NEW LOW 30 MIN: CPT | Performed by: FAMILY MEDICINE

## 2023-10-23 NOTE — PROGRESS NOTES
[facility] CLINICS AND SURGERY CENTER  SPORTS & ORTHOPEDIC CLINIC VISIT     Oct 23, 2023        ASSESSMENT & PLAN    68-year-old with left hip pain due to osteoarthritis    Reviewed imaging and assessment with patient in detail  Reviewed options including physical therapy, indication management, steroid injection, arthroplasty  After discussion the patient would like to visit with a surgeon to discuss arthroplasty.  If at that time he opts for continued medical management he will contact us for steroid injection    Rai Santana MD  [facility] SPORTS MEDICINE [facility]    -----  Chief Complaint   Patient presents with    Left Hip - Pain       SUBJECTIVE  Les ANGEL Paez is a/an 68 year old male who is seen in consultation at the request of  Clyde Day M.D. for evaluation of  left hip pain.     The patient is seen by themselves.  The patient is Right handed    Onset: 4-5 years(s) ago. Patient describes injury as walking and felt a twinge in his hip but did not think much of it.   Location of Pain: left deep inside hip   Worsened by: Sitting for extended periods  Better with: NA   Treatments tried: ibuprofen  Associated symptoms: no distal numbness or tingling; denies swelling or warmth    Orthopedic/Surgical history: NO  Social History/Occupation: Retired       REVIEW OF SYSTEMS:  Do you have fever, chills, weight loss? No  Do you have any vision problems? No  Do you have any chest pain or edema? No  Do you have any shortness of breath or wheezing?  No  Do you have stomach problems? No  Do you have any numbness or focal weakness? No  Do you have diabetes? No  Do you have problems with bleeding or clotting? No  Do you have an rashes or other skin lesions? No    OBJECTIVE:  There were no vitals taken for this visit.     Exam:  Patient is alert, in no acute distress, pleasant and conversational.      left Hip:  Supine PROM:  Flexion: Approximately 115 , no tenderness.  External rotation: approximately  45 , no tenderness.  Internal rotation: Approximately 0 , with pain    Strength Testing:  Hip flexion: 5/5.  Hip adduction: 5/5.  Hip abduction: 4+/5.  No subjective pain reported with strength testing.   Palpation:  negative tenderness to palpation over the greater trochanter.  negative tenderness to palpation over psoas  negative tenderness to palpation over ASIS  Special Tests:  None      Neurovascularly intact in bilateral lower extremities      RADIOLOGY:    2 view xrays of left hip performed 10/17/2023 and reviewed independently demonstrating no acute findings.  Severe DJD of the left hip. See EMR for formal radiology report.

## 2023-10-23 NOTE — LETTER
10/23/2023      RE: Socrates Paez  [address]  [place] [address]     Dear Colleague,    Thank you for referring your patient, Socrates Paez, to the [facility] SPORTS MEDICINE [facility]. Please see a copy of my visit note below.      [facility] AND SURGERY CENTER  SPORTS & ORTHOPEDIC CLINIC VISIT     Oct 23, 2023        ASSESSMENT & PLAN    68-year-old with left hip pain due to osteoarthritis    Reviewed imaging and assessment with patient in detail  Reviewed options including physical therapy, indication management, steroid injection, arthroplasty  After discussion the patient would like to visit with a surgeon to discuss arthroplasty.  If at that time he opts for continued medical management he will contact us for steroid injection    Rai Santana MD  [facility]    -----  Chief Complaint   Patient presents with    Left Hip - Pain       SUBJECTIVE  Socrates Paez is a/an 68 year old male who is seen in consultation at the request of  Clyde Day M.D. for evaluation of  left hip pain.     The patient is seen by themselves.  The patient is Right handed    Onset: 4-5 years(s) ago. Patient describes injury as walking and felt a twinge in his hip but did not think much of it.   Location of Pain: left deep inside hip   Worsened by: Sitting for extended periods  Better with: NA   Treatments tried: ibuprofen  Associated symptoms: no distal numbness or tingling; denies swelling or warmth    Orthopedic/Surgical history: NO  Social History/Occupation: Retired       REVIEW OF SYSTEMS:  Do you have fever, chills, weight loss? No  Do you have any vision problems? No  Do you have any chest pain or edema? No  Do you have any shortness of breath or wheezing?  No  Do you have stomach problems? No  Do you have any numbness or focal weakness? No  Do you have diabetes? No  Do you have problems with bleeding or clotting? No  Do you have an rashes or  other skin lesions? No    OBJECTIVE:  There were no vitals taken for this visit.     Exam:  Patient is alert, in no acute distress, pleasant and conversational.      left Hip:  Supine PROM:  Flexion: Approximately 115 , no tenderness.  External rotation: approximately 45 , no tenderness.  Internal rotation: Approximately 0 , with pain    Strength Testing:  Hip flexion: 5/5.  Hip adduction: 5/5.  Hip abduction: 4+/5.  No subjective pain reported with strength testing.   Palpation:  negative tenderness to palpation over the greater trochanter.  negative tenderness to palpation over psoas  negative tenderness to palpation over ASIS  Special Tests:  None      Neurovascularly intact in bilateral lower extremities      RADIOLOGY:    2 view xrays of left hip performed 10/17/2023 and reviewed independently demonstrating no acute findings.  Severe DJD of the left hip. See EMR for formal radiology report.           Again, thank you for allowing me to participate in the care of your patient.      Sincerely,    Rai Santana MD

## 2023-10-25 NOTE — TELEPHONE ENCOUNTER
DIAGNOSIS: Hip replacement per Dr Santana from visit on 10/23    APPOINTMENT DATE: 11/30/23   NOTES STATUS DETAILS   OFFICE NOTE from referring provider Internal 10/23/23 OV KATHI SANTANA - LEFT HIP PAIN    OFFICE NOTE from other specialist Internal 10/17/23 OV OMID SWEET MD - LEFT HIP PAIN    MEDICATION LIST Internal    LABS     XRAYS (IMAGES & REPORTS) Internal XR L HIP:  - 10/17/23     XR LUMBAR SPINE:  - 04/01/9

## 2023-10-27 ENCOUNTER — PATIENT OUTREACH (OUTPATIENT)
Dept: CARE COORDINATION | Facility: CLINIC | Age: 68
End: 2023-10-27
Payer: COMMERCIAL

## 2023-10-27 NOTE — PROGRESS NOTES
Clinic Care Coordination Contact  [facility]/Voicemail    Clinical Data: Care Coordinator Outreach    Outreach Documentation Number of Outreach Attempt   10/27/2023   2:57 PM 1       Left message on patient's voicemail with call back information and requested return call.    Plan: Care Coordinator CHW to discuss referral and offer assistance and enrollment with CCC  Care Coordinator will try to reach patient again in 1-2 business days.    Financial help, paying for follow up visits    Mitzi PRATER  Community Health Worker  [facility] Care Coordination  Elizabeth Saeed [email or web address]  Office: [phone]

## 2023-10-27 NOTE — PROGRESS NOTES
Contact   Chart Review     Situation: Patient chart reviewed by .      Assessment: PCP asked care coordination team to reach out to patient to discuss financial concerns.     Plan/Recommendations: Delegating to CHW to call patient.     Shereen Garza,   [facility]  [phone]

## 2023-10-30 ENCOUNTER — PATIENT OUTREACH (OUTPATIENT)
Dept: CARE COORDINATION | Facility: CLINIC | Age: 68
End: 2023-10-30
Payer: COMMERCIAL

## 2023-10-30 NOTE — PROGRESS NOTES
Clinic Care Coordination Contact  [facility]/Voicemail    Clinical Data: Care Coordinator Outreach    Outreach Documentation Number of Outreach Attempt   10/27/2023   2:57 PM 1   10/30/2023   2:10 PM 2       Left message on patient's voicemail with call back information and requested return call.    Plan: Care Coordinator sent care coordination introduction letter on 10/30/23 via [place]. Care Coordinator will do no further outreaches at this time.    Mitzi PRATER  Community Health Worker  [facility] Care Coordination  [place] [email or web address]  Office: [phone]

## 2023-10-30 NOTE — LETTER
[facility] CARE COORDINATION  [address]  [place] [address]    October 30, 2023    Socrates Paez  [address]   [place] [address]      Dear Socrates,    I am a clinic community health worker who works with Clyde Day MD with the [facility]. I have been trying to reach you recently to introduce Clinic Care Coordination. Below is a description of clinic care coordination and how I can further assist you.       The clinic care coordination team is made up of a registered nurse, , financial resource worker and community health worker who understand the health care system. The goal of clinic care coordination is to help you manage your health and improve access to the health care system. Our team works alongside your provider to assist you in determining your health and social needs. We can help you obtain health care and community resources, providing you with necessary information and education. We can work with you through any barriers and develop a care plan that helps coordinate and strengthen the communication between you and your care team.  Our services are voluntary and are offered without charge to you personally.    Please feel free to contact me with any questions or concerns regarding care coordination and what we can offer.      We are focused on providing you with the highest-quality healthcare experience possible.    Sincerely,     Mitzi PRATER  Community Health Worker  [facility]  Clinic Care Coordination  Elizabeth Saeed [email or web address]  Office: [phone]

## 2023-10-31 ENCOUNTER — OFFICE VISIT (OUTPATIENT)
Dept: INTERNAL MEDICINE | Facility: CLINIC | Age: 68
End: 2023-10-31
Payer: COMMERCIAL

## 2023-10-31 ENCOUNTER — NURSE TRIAGE (OUTPATIENT)
Dept: NURSING | Facility: CLINIC | Age: 68
End: 2023-10-31

## 2023-10-31 VITALS
HEIGHT: 69 IN | WEIGHT: 184.4 LBS | RESPIRATION RATE: 16 BRPM | HEART RATE: 64 BPM | OXYGEN SATURATION: 97 % | BODY MASS INDEX: 27.31 KG/M2 | SYSTOLIC BLOOD PRESSURE: 126 MMHG | TEMPERATURE: 97.4 F | DIASTOLIC BLOOD PRESSURE: 84 MMHG

## 2023-10-31 DIAGNOSIS — Z87.891 PERSONAL HISTORY OF TOBACCO USE, PRESENTING HAZARDS TO HEALTH: ICD-10-CM

## 2023-10-31 DIAGNOSIS — Z87.891 PERSONAL HISTORY OF TOBACCO USE: ICD-10-CM

## 2023-10-31 DIAGNOSIS — G47.09 OTHER INSOMNIA: Primary | ICD-10-CM

## 2023-10-31 DIAGNOSIS — E78.2 MIXED HYPERLIPIDEMIA: ICD-10-CM

## 2023-10-31 DIAGNOSIS — Z87.19 H/O CHRONIC ACTIVE HEPATITIS: ICD-10-CM

## 2023-10-31 DIAGNOSIS — Z71.6 ENCOUNTER FOR TOBACCO USE CESSATION COUNSELING: ICD-10-CM

## 2023-10-31 DIAGNOSIS — F98.8 ATTENTION DEFICIT DISORDER (ADD) WITHOUT HYPERACTIVITY: ICD-10-CM

## 2023-10-31 DIAGNOSIS — G62.9 NEUROPATHY: ICD-10-CM

## 2023-10-31 DIAGNOSIS — F43.10 PTSD (POST-TRAUMATIC STRESS DISORDER): ICD-10-CM

## 2023-10-31 LAB
AFP SERPL-MCNC: 2.8 NG/ML
ALBUMIN SERPL BCG-MCNC: 4.9 G/DL (ref 3.5–5.2)
ALP SERPL-CCNC: 104 U/L (ref 40–129)
ALT SERPL W P-5'-P-CCNC: 20 U/L (ref 0–70)
ANION GAP SERPL CALCULATED.3IONS-SCNC: 15 MMOL/L (ref 7–15)
AST SERPL W P-5'-P-CCNC: 25 U/L (ref 0–45)
BILIRUB SERPL-MCNC: 0.8 MG/DL
BUN SERPL-MCNC: 28.3 MG/DL (ref 8–23)
CALCIUM SERPL-MCNC: 10.9 MG/DL (ref 8.8–10.2)
CHLORIDE SERPL-SCNC: 98 MMOL/L (ref 98–107)
CREAT SERPL-MCNC: 1.15 MG/DL (ref 0.67–1.17)
DEPRECATED HCO3 PLAS-SCNC: 24 MMOL/L (ref 22–29)
EGFRCR SERPLBLD CKD-EPI 2021: 69 ML/MIN/1.73M2
GLUCOSE SERPL-MCNC: 136 MG/DL (ref 70–99)
HBA1C MFR BLD: 6.2 % (ref 0–5.6)
POTASSIUM SERPL-SCNC: 4.3 MMOL/L (ref 3.4–5.3)
PROT SERPL-MCNC: 8.5 G/DL (ref 6.4–8.3)
SODIUM SERPL-SCNC: 137 MMOL/L (ref 135–145)
TSH SERPL DL<=0.005 MIU/L-ACNC: 3.37 UIU/ML (ref 0.3–4.2)

## 2023-10-31 PROCEDURE — 83921 ORGANIC ACID SINGLE QUANT: CPT | Performed by: INTERNAL MEDICINE

## 2023-10-31 PROCEDURE — G0008 ADMIN INFLUENZA VIRUS VAC: HCPCS | Performed by: INTERNAL MEDICINE

## 2023-10-31 PROCEDURE — 87522 HEPATITIS C REVRS TRNSCRPJ: CPT | Performed by: INTERNAL MEDICINE

## 2023-10-31 PROCEDURE — G0296 VISIT TO DETERM LDCT ELIG: HCPCS | Performed by: INTERNAL MEDICINE

## 2023-10-31 PROCEDURE — 90662 IIV NO PRSV INCREASED AG IM: CPT | Performed by: INTERNAL MEDICINE

## 2023-10-31 PROCEDURE — 82105 ALPHA-FETOPROTEIN SERUM: CPT | Performed by: INTERNAL MEDICINE

## 2023-10-31 PROCEDURE — 80053 COMPREHEN METABOLIC PANEL: CPT | Performed by: INTERNAL MEDICINE

## 2023-10-31 PROCEDURE — 84443 ASSAY THYROID STIM HORMONE: CPT | Performed by: INTERNAL MEDICINE

## 2023-10-31 PROCEDURE — 36415 COLL VENOUS BLD VENIPUNCTURE: CPT | Performed by: INTERNAL MEDICINE

## 2023-10-31 PROCEDURE — 99214 OFFICE O/P EST MOD 30 MIN: CPT | Mod: 25 | Performed by: INTERNAL MEDICINE

## 2023-10-31 PROCEDURE — 83036 HEMOGLOBIN GLYCOSYLATED A1C: CPT | Performed by: INTERNAL MEDICINE

## 2023-10-31 RX ORDER — RESPIRATORY SYNCYTIAL VIRUS VACCINE 120MCG/0.5
0.5 KIT INTRAMUSCULAR ONCE
Qty: 1 EACH | Refills: 0 | Status: CANCELLED | OUTPATIENT
Start: 2023-10-31 | End: 2023-10-31

## 2023-10-31 RX ORDER — VARENICLINE TARTRATE 0.5 (11)-1
KIT ORAL
Qty: 53 TABLET | Refills: 0 | Status: SHIPPED | OUTPATIENT
Start: 2023-10-31 | End: 2024-02-07

## 2023-10-31 RX ORDER — DEXTROAMPHETAMINE SACCHARATE, AMPHETAMINE ASPARTATE MONOHYDRATE, DEXTROAMPHETAMINE SULFATE AND AMPHETAMINE SULFATE 5; 5; 5; 5 MG/1; MG/1; MG/1; MG/1
40 CAPSULE, EXTENDED RELEASE ORAL EVERY MORNING
Qty: 60 CAPSULE | Refills: 0 | Status: SHIPPED | OUTPATIENT
Start: 2023-10-31 | End: 2023-11-24
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
## 2023-10-31 NOTE — PATIENT INSTRUCTIONS
[facility] is for any [place] looking for free support to quit smoking, vaping or chewing.   [facility] will offer many quit support options and resources so [place] residents can continue to find the way to quit that works best for them.   Free support includes personalized coaching, email and text support, educational materials, and quit medication (nicotine patches, gum or lozenges) delivered by mail.     Contact [facility] at 3-363-QUIT-NOW or online at [employer] to receive support on your quit journey.    Lung Cancer Screening   Frequently Asked Questions  If you are at high-risk for lung cancer, getting screened with low-dose computed tomography (LDCT) every year can help save your life. This handout offers answers to some of the most common questions about lung cancer screening. If you have other questions, please call [phone] ([phone]).     What is it?  Lung cancer screening uses special X-ray technology to create an image of your lung tissue. The exam is quick and easy and takes less than 10 seconds. We don t give you any medicine or use any needles. You can eat before and after the exam. You don t need to change your clothes as long as the clothing on your chest doesn t contain metal. But, you do need to be able to hold your breath for at least 6 seconds during the exam.    What is the goal of lung cancer screening?  The goal of lung cancer screening is to save lives. Many times, lung cancer is not found until a person starts having physical symptoms. Lung cancer screening can help detect lung cancer in the earliest stages when it may be easier to treat.    Who should be screened for lung cancer?  We suggest lung cancer screening for anyone who is at high-risk for lung cancer. You are in the high-risk group if you:      are between the ages of 55 and 79, and    have smoked at least 1 pack of cigarettes a day for 20 or more years, and    still smoke or have quit within  the past 15 years.    However, if you have a new cough or shortness of breath, you should talk to your doctor before being screened.    Why does it matter if I have symptoms?  Certain symptoms can be a sign that you have a condition in your lungs that should be checked and treated by your doctor. These symptoms include fever, chest pain, a new or changing cough, shortness of breath that you have never felt before, coughing up blood or unexplained weight loss. Having any of these symptoms can greatly affect the results of lung cancer screening.       Should all smokers get an LDCT lung cancer screening exam?  It depends. Lung cancer screening is for a very specific group of men and women who have a history of heavy smoking over a long period of time (see  Who should be screened for lung cancer  above).  I am in the high-risk group, but have been diagnosed with cancer in the past. Is LDCT lung cancer screening right for me?  In some cases, you should not have LDCT lung screening, such as when your doctor is already following your cancer with CT scan studies. Your doctor will help you decide if LDCT lung screening is right for you.  Do I need to have a screening exam every year?  Yes. If you are in the high-risk group described earlier, you should get an LDCT lung cancer screening exam every year until you are 79, or are no longer willing or able to undergo screening and possible procedures to diagnose and treat lung cancer.  How effective is LDCT at preventing death from lung cancer?  Studies have shown that LDCT lung cancer screening can lower the risk of death from lung cancer by 20 percent in people who are at high-risk.  What are the risks?  There are some risks and limitations of LDCT lung cancer screening. We want to make sure you understand the risks and benefits, so please let us know if you have any questions. Your doctor may want to talk with you more about these risks.    Radiation exposure: As with any  exam that uses radiation, there is a very small increased risk of cancer. The amount of radiation in LDCT is small--about the same amount a person would get from a mammogram. Your doctor orders the exam when he or she feels the potential benefits outweigh the risks.    False negatives: No test is perfect, including LDCT. It is possible that you may have a medical condition, including lung cancer, that is not found during your exam. This is called a false negative result.    False positives and more testing: LDCT very often finds something in the lung that could be cancer, but in fact is not. This is called a false positive result. False positive tests often cause anxiety. To make sure these findings are not cancer, you may need to have more tests. These tests will be done only if you give us permission. Sometimes patients need a treatment that can have side effects, such as a biopsy. For more information on false positives, see  What can I expect from the results?     Findings not related to lung cancer: Your LDCT exam also takes pictures of areas of your body next to your lungs. In a very small number of cases, the CT scan will show an abnormal finding in one of these areas, such as your kidneys, adrenal glands, liver or thyroid. This finding may not be serious, but you may need more tests. Your doctor can help you decide what other tests you may need, if any.  What can I expect from the results?  About 1 out of 4 LDCT exams will find something that may need more tests. Most of the time, these findings are lung nodules. Lung nodules are very small collections of tissue in the lung. These nodules are very common, and the vast majority--more than 97 percent--are not cancer (benign). Most are normal lymph nodes or small areas of scarring from past infections.  But, if a small lung nodule is found to be cancer, the cancer can be cured more than 90 percent of the time. To know if the nodule is cancer, we may need to get  more images before your next yearly screening exam. If the nodule has suspicious features (for example, it is large, has an odd shape or grows over time), we will refer you to a specialist for further testing.  Will my doctor also get the results?  Yes. Your doctor will get a copy of your results.  Is it okay to keep smoking now that there s a cancer screening exam?  No. Tobacco is one of the strongest cancer-causing agents. It causes not only lung cancer, but other cancers and cardiovascular (heart) diseases as well. The damage caused by smoking builds over time. This means that the longer you smoke, the higher your risk of disease. While it is never too late to quit, the sooner you quit, the better.  Where can I find help to quit smoking?  The best way to prevent lung cancer is to stop smoking. If you have already quit smoking, congratulations and keep it up! For help on quitting smoking, please call [facility] at 6-811-QUITNOW (1-949.999.5435) or the American [employer] at 1-753.275.1236 to find local resources near you.  One-on-one health coaching:  If you d prefer to work individually with a health care provider on tobacco cessation, we offer:      Medication Therapy Management:  Our specially trained pharmacists work closely with you and your doctor to help you quit smoking.  Call [phone] or [phone] (toll free).

## 2023-10-31 NOTE — TELEPHONE ENCOUNTER
Spoke with patient and informed him that Dr. Day does not have any openings. Pt was agreeable with seeing another [facility] provider. Pt was assisted with scheduling an appointment for today.

## 2023-10-31 NOTE — TELEPHONE ENCOUNTER
"Nurse Triage SBAR    Is this a 2nd Level Triage? YES, LICENSED PRACTITIONER REVIEW IS REQUIRED    Situation:   Patient is calling w/ symptoms of intermittent R foot pain.    Background:   Patient denies having any injury to his R foot.  Patient reports this pain is new for him & it started yesterday.    Assessment:   Patient reports his foot pain runs up & down the inner side of his R foot.  Patient reports this pain is sharp & may last for 5 seconds.  Patient rates his pain an 8/10.  He states the pain kept him up at night.    Patient reports this pain occurs when he \"is relaxing\" or when his pants \"graze his foot.\"  Patient denies having the foot pain when he is walking.    Patient denies that he has a fever or that his foot is red or swollen.      Protocol Recommended Disposition:   See in Office Today    Recommendation: Per protocol, it is advised that the patient be seen in office today.  Patient wanted to make an appt w/ PCP.     Routed to provider  Please review & advise.    Jackie Sparks RN on 10/31/2023 at 9:22 AM     Does the patient meet one of the following criteria for ADS visit consideration? 16+ years old, with an [facility] PCP     TIP  Providers, please consider if this condition is appropriate for management at one of our Acute and Diagnostic Services sites.     If patient is a good candidate, please use dotphrase <dot>triageresponse and select Refer to ADS to document.            Reason for Disposition   SEVERE pain (e.g., excruciating, unable to do any normal activities)    Additional Information   Negative: Entire foot is cool or blue in comparison to other foot   Negative: Purple or black skin on foot or toe   Negative: Red area or streak and fever   Negative: Swollen foot and fever   Negative: Patient sounds very sick or weak to the triager    Protocols used: Foot Pain-A-[place]    "

## 2023-11-01 DIAGNOSIS — F31.75 BIPOLAR DISORDER, IN PARTIAL REMISSION, MOST RECENT EPISODE DEPRESSED (H): ICD-10-CM

## 2023-11-01 DIAGNOSIS — R00.0 TACHYCARDIA: ICD-10-CM

## 2023-11-01 DIAGNOSIS — I10 ESSENTIAL HYPERTENSION, BENIGN: ICD-10-CM

## 2023-11-01 RX ORDER — QUETIAPINE FUMARATE 50 MG/1
TABLET, FILM COATED ORAL
Qty: 60 TABLET | Refills: 11 | Status: SHIPPED | OUTPATIENT
Start: 2023-11-01 | End: 2024-03-04

## 2023-11-01 RX ORDER — METOPROLOL SUCCINATE 50 MG/1
50 TABLET, EXTENDED RELEASE ORAL DAILY
Qty: 90 TABLET | Refills: 3 | Status: SHIPPED | OUTPATIENT
Start: 2023-11-01

## 2023-11-02 ENCOUNTER — ANCILLARY PROCEDURE (OUTPATIENT)
Dept: CT IMAGING | Facility: CLINIC | Age: 68
End: 2023-11-02
Attending: INTERNAL MEDICINE
Payer: COMMERCIAL

## 2023-11-02 ENCOUNTER — ANCILLARY PROCEDURE (OUTPATIENT)
Dept: ULTRASOUND IMAGING | Facility: CLINIC | Age: 68
End: 2023-11-02
Attending: INTERNAL MEDICINE
Payer: COMMERCIAL

## 2023-11-02 DIAGNOSIS — Z87.19 H/O CHRONIC ACTIVE HEPATITIS: ICD-10-CM

## 2023-11-02 DIAGNOSIS — Z87.891 PERSONAL HISTORY OF TOBACCO USE, PRESENTING HAZARDS TO HEALTH: ICD-10-CM

## 2023-11-02 LAB
HCV RNA SERPL NAA+PROBE-ACNC: NOT DETECTED IU/ML
METHYLMALONATE SERPL-SCNC: 0.17 UMOL/L (ref 0–0.4)

## 2023-11-02 PROCEDURE — 71271 CT THORAX LUNG CANCER SCR C-: CPT | Performed by: RADIOLOGY

## 2023-11-02 PROCEDURE — 76705 ECHO EXAM OF ABDOMEN: CPT | Mod: GC | Performed by: STUDENT IN AN ORGANIZED HEALTH CARE EDUCATION/TRAINING PROGRAM

## 2023-11-03 LAB — RADIOLOGIST FLAGS: NORMAL

## 2023-11-04 DIAGNOSIS — R16.0 LIVER MASS: Primary | ICD-10-CM

## 2023-11-06 ENCOUNTER — TELEPHONE (OUTPATIENT)
Dept: INTERNAL MEDICINE | Facility: CLINIC | Age: 68
End: 2023-11-06
Payer: COMMERCIAL

## 2023-11-06 NOTE — TELEPHONE ENCOUNTER
[facility] called clinic with an incidental findin/2/23 US abdomen limited:     IMPRESSION:   1. Diffuse increased hepatic parenchymal echogenicity which may  represent parenchymal liver disease including hepatic steatosis.  2. Hypoechoic observation measuring up to 1.5 cm adjacent to the  gallbladder, indeterminate, differentials include focal fatty sparing  versus masslike lesion. Consider short-term follow-up ultrasound in 3  months or contrast enhanced MRI for further evaluation, as clinically  desired.

## 2023-11-07 NOTE — TELEPHONE ENCOUNTER
Spoke with patient and he said that he saw the US and CT result notes from Dr. Bae (imaging completed on 11/2).     He has the follow-up MRI scheduled for 12/2/23. He did not have any further questions or concerns at the time of this phone call.

## 2023-11-24 DIAGNOSIS — F98.8 ATTENTION DEFICIT DISORDER (ADD) WITHOUT HYPERACTIVITY: ICD-10-CM

## 2023-11-26 RX ORDER — DEXTROAMPHETAMINE SACCHARATE, AMPHETAMINE ASPARTATE MONOHYDRATE, DEXTROAMPHETAMINE SULFATE AND AMPHETAMINE SULFATE 5; 5; 5; 5 MG/1; MG/1; MG/1; MG/1
40 CAPSULE, EXTENDED RELEASE ORAL EVERY MORNING
Qty: 60 CAPSULE | Refills: 0 | Status: SHIPPED | OUTPATIENT
Start: 2023-11-26 | End: 2023-12-26

## 2023-11-30 ENCOUNTER — OFFICE VISIT (OUTPATIENT)
Dept: ORTHOPEDICS | Facility: CLINIC | Age: 68
End: 2023-11-30
Payer: COMMERCIAL

## 2023-11-30 ENCOUNTER — ANCILLARY PROCEDURE (OUTPATIENT)
Dept: GENERAL RADIOLOGY | Facility: CLINIC | Age: 68
End: 2023-11-30
Attending: ORTHOPAEDIC SURGERY
Payer: COMMERCIAL

## 2023-11-30 ENCOUNTER — TELEPHONE (OUTPATIENT)
Dept: ORTHOPEDICS | Facility: CLINIC | Age: 68
End: 2023-11-30

## 2023-11-30 ENCOUNTER — PRE VISIT (OUTPATIENT)
Dept: ORTHOPEDICS | Facility: CLINIC | Age: 68
End: 2023-11-30

## 2023-11-30 VITALS — HEIGHT: 69 IN | BODY MASS INDEX: 26.66 KG/M2 | WEIGHT: 180 LBS

## 2023-11-30 DIAGNOSIS — M16.12 PRIMARY OSTEOARTHRITIS OF LEFT HIP: ICD-10-CM

## 2023-11-30 DIAGNOSIS — M16.12 PRIMARY LOCALIZED OSTEOARTHRITIS OF LEFT HIP: Primary | ICD-10-CM

## 2023-11-30 DIAGNOSIS — M16.12 PRIMARY OSTEOARTHRITIS OF LEFT HIP: Primary | ICD-10-CM

## 2023-11-30 LAB — RADIOLOGIST FLAGS: NORMAL

## 2023-11-30 PROCEDURE — 99204 OFFICE O/P NEW MOD 45 MIN: CPT | Performed by: ORTHOPAEDIC SURGERY

## 2023-11-30 PROCEDURE — 72170 X-RAY EXAM OF PELVIS: CPT | Mod: GC | Performed by: RADIOLOGY

## 2023-11-30 ASSESSMENT — HOOS JR
BENDING TO THE FLOOR TO PICK UP OBJECT: MODERATE
HOOS JR TOTAL INTERVAL SCORE: 36.36
LYING IN BED (TURNING OVER, MAINTAINING HIP POSITION): MODERATE
GOING UP OR DOWN STAIRS: SEVERE
WALKING ON UNEVEN SURFACE: SEVERE
SITTING: EXTREME
RISING FROM SITTING: SEVERE

## 2023-11-30 NOTE — TELEPHONE ENCOUNTER
XR PELVIS  Date: 11-    Incidental finding: Consider follow up.  Impression:Nonspecific patchy lucency of left proximal femur.

## 2023-11-30 NOTE — LETTER
11/30/2023         RE: Socrates aPez  [address]  [place] [address]        Dear Colleague,    Thank you for referring your patient, Socrates Paez, to the [facility] [place]. Please see a copy of my visit note below.    Assessment: This is a 68-year-old male with advanced left hip osteoarthritis associated with pretty significant symptoms.  He is quite anxious to move forward with total hip.  He like to have this done in the next month or 2.  I discussed with the patient that I would not be able to get him on the schedule that fast.  He was very clear that he would not except a surgery date that was not within the next month or 2.  We also discussed that it be my recommendation that he would would be a non-smoker for 8 weeks prior to surgery.  We provided him with names of alternative surgeons today.    Plan: As needed      Chief Complaint: Consult (Dr Santana refer for Left hip OA. No injections done - Dr Santana said it wouldn't help him much/Hoos 36.36 [facility] 4)      Physician:  No ref. provider found    HPI: Socrates Paez is a 68 year old male who presents today for evaluation of his left hip    Location of symptoms: 9  Onset: Insidious  Duration of symptoms: 1 to 2 years  Quality of symptoms: Aching and sharp  Severity: Severe  Alleviating: activity modification  Exacerbating: activities  Previous Treatments: Previous treatments include activity modification, oral pain medication    HOOS Jr: 36.36  PROMIS Mental: (P) 10  PROMIS Physical: (P) 11  PROMIS Total: (P) 21  [facility] Activity Scale:    MEDICAL HISTORY:   Past Medical History:   Diagnosis Date    ADHD (attention deficit hyperactivity disorder)     Alcoholism (H)     Bipolar disorder (H)     Chronic kidney disease, stage 3 (H) 09/02/2021    Chronic viral hepatitis C (H)     treated    Depression     Essential hypertension     Hyperlipidemia LDL goal <100     Primary osteoarthritis of left hip 10/17/2023    Psoriasis      Sleep disorder 03/20/2023    Tobacco dependence syndrome        Pertinent negatives:  Patient has no history of DVT or PE. Discussed risk factors.    Medications:     Current Outpatient Medications:     amitriptyline (ELAVIL) 10 MG tablet, Take 1 tablet (10 mg) by mouth At Bedtime, Disp: 30 tablet, Rfl: 11    amLODIPine (NORVASC) 5 MG tablet, Take 1 tablet (5 mg) by mouth daily, Disp: 90 tablet, Rfl: 3    amphetamine-dextroamphetamine (ADDERALL XR) 20 MG 24 hr capsule, Take 2 capsules (40 mg) by mouth every morning, Disp: 60 capsule, Rfl: 0    baclofen (LIORESAL) 10 MG tablet, Take 1 tablet (10 mg) by mouth 3 times daily as needed for muscle spasms, Disp: 20 tablet, Rfl: 1    fluticasone propionate (FLONASE) 50 mcg/actuation nasal spray, [FLUTICASONE PROPIONATE (FLONASE) 50 MCG/ACTUATION NASAL SPRAY] SHAKE LIQUID AND USE 2 SPRAYS IN EACH NOSTRIL DAILY, Disp: 48 g, Rfl: 3    ibuprofen (ADVIL/MOTRIN) 600 MG tablet, TAKE 1 TABLET(600 MG) BY MOUTH EVERY 8 HOURS AS NEEDED FOR MODERATE PAIN, Disp: 90 tablet, Rfl: 3    loratadine (CLARITIN) 10 MG tablet, TAKE 1 TABLET(10 MG) BY MOUTH DAILY, Disp: 30 tablet, Rfl: 11    losartan-hydrochlorothiazide (HYZAAR) 100-25 MG tablet, Take 1 tablet by mouth daily, Disp: 90 tablet, Rfl: 3    metoprolol succinate ER (TOPROL XL) 50 MG 24 hr tablet, Take 1 tablet (50 mg) by mouth daily, Disp: 90 tablet, Rfl: 3    nicotine (NICORETTE) 2 MG gum, Place 1 each (2 mg) inside cheek every hour as needed for smoking cessation, Disp: 90 each, Rfl: 3    QUEtiapine (SEROQUEL) 50 MG tablet, TAKE 1-2 TABLETS BY MOUTH EVERY NIGHT AT BEDTIME, Disp: 60 tablet, Rfl: 11    varenicline (CHANTIX HYACINTH) 0.5 MG X 11 & 1 MG X 42 tablet, Take 0.5 mg tab daily for 3 days, THEN 0.5 mg tab twice daily for 4 days, THEN 1 mg twice daily., Disp: 53 tablet, Rfl: 0    Allergies: Ace inhibitors, Ambien [zolpidem], Lisinopril (bulk) [lisinopril], Lithium carbonate [lithium], Risperidone, Wellbutrin [bupropion], Abilify  "[aripiprazole], and Depakene [valproic acid]    SURGICAL HISTORY:   Past Surgical History:   Procedure Laterality Date    LIVER BIOPSY         HISTORY:   Family History   Problem Relation Age of Onset    No Known Problems Mother         unknown cause age 53    Alcoholism Father     Cirrhosis Father          age 66    HIV/AIDS Brother         age 28    Throat cancer Brother         smoker       SOCIAL HISTORY:   Social History     Tobacco Use    Smoking status: Every Day     Packs/day: .5     Types: Cigarettes    Smokeless tobacco: Never    Tobacco comments:     5 cigarettes a day   Substance Use Topics    Alcohol use: No       REVIEW OF SYSTEMS:  The comprehensive review of systems from the intake form was reviewed with the patient.  No fever, weight change or fatigue. No dry eyes. No oral ulcers, sore throat or voice change. No palpitations, syncope, angina or edema.  No chest pain, excessive sleepiness, shortness of breath or hemoptysis.   No abdominal pain, nausea, vomiting, diarrhea or heartburn.  No skin rash. No focal weakness or numbness. No bleeding or lymphadenopathy. No rhinitis or hives.     Exam:  On physical examination the patient appears the stated age, is in no acute distress, alert and oriented, affect is appropriate, and breathing is non-labored.  Vitals are documented in the EMR and have been reviewed:    Ht 1.753 m (5' 9\")   Wt 81.6 kg (180 lb)   BMI 26.58 kg/m    5' 9\"  Body mass index is 26.58 kg/m .    Rises from chair: Easily  Gait: Antalgic with less time in the left  Trendelenburg test:  Gains the exam table: Easily    RIGHT hip subjective: Benign  Abd:  Add:  Flexion: 100  IRF: 5  ERF: 35  Impingement test: Negative  Tenderness to palpation: No    LEFT hip subjective: Little irritated  Abd:  Add:  Flexion: 90  IRF: -5  ERF: 25  Impingement test: Positive groin  Tenderness to palpation    Distal the circulatory, motor, and sensation exam is intact with 5/5 EHL, gastroc-soleus, and " tibialis anterior.  Sensation to light touch is intact.  Dorsalis pedis and posterior tibialis pulses are palpable.  There are no sores on the feet, no bruising, and no lymphedema.    Imaging:   Lopez left hip osteoarthritis      Tone Dorantes MD

## 2023-11-30 NOTE — PROGRESS NOTES
Assessment: This is a 68-year-old male with advanced left hip osteoarthritis associated with pretty significant symptoms.  He is quite anxious to move forward with total hip.  He like to have this done in the next month or 2.  I discussed with the patient that I would not be able to get him on the schedule that fast.  He was very clear that he would not except a surgery date that was not within the next month or 2.  We also discussed that it be my recommendation that he would would be a non-smoker for 8 weeks prior to surgery.  We provided him with names of alternative surgeons today.    Plan: As needed      Chief Complaint: Consult (Dr Santana refer for Left hip OA. No injections done - Dr Santana said it wouldn't help him much/Hoos 36.36 [facility] 4)      Physician:  No ref. provider found    HPI: Socrates Paez is a 68 year old male who presents today for evaluation of his left hip    Location of symptoms: 9  Onset: Insidious  Duration of symptoms: 1 to 2 years  Quality of symptoms: Aching and sharp  Severity: Severe  Alleviating: activity modification  Exacerbating: activities  Previous Treatments: Previous treatments include activity modification, oral pain medication    HOOS Jr: 36.36  PROMIS Mental: (P) 10  PROMIS Physical: (P) 11  PROMIS Total: (P) 21  [facility] Activity Scale:    MEDICAL HISTORY:   Past Medical History:   Diagnosis Date    ADHD (attention deficit hyperactivity disorder)     Alcoholism (H)     Bipolar disorder (H)     Chronic kidney disease, stage 3 (H) 09/02/2021    Chronic viral hepatitis C (H)     treated    Depression     Essential hypertension     Hyperlipidemia LDL goal <100     Primary osteoarthritis of left hip 10/17/2023    Psoriasis     Sleep disorder 03/20/2023    Tobacco dependence syndrome        Pertinent negatives:  Patient has no history of DVT or PE. Discussed risk factors.    Medications:     Current Outpatient Medications:     amitriptyline (ELAVIL) 10 MG tablet, Take 1 tablet (10 mg) by  mouth At Bedtime, Disp: 30 tablet, Rfl: 11    amLODIPine (NORVASC) 5 MG tablet, Take 1 tablet (5 mg) by mouth daily, Disp: 90 tablet, Rfl: 3    amphetamine-dextroamphetamine (ADDERALL XR) 20 MG 24 hr capsule, Take 2 capsules (40 mg) by mouth every morning, Disp: 60 capsule, Rfl: 0    baclofen (LIORESAL) 10 MG tablet, Take 1 tablet (10 mg) by mouth 3 times daily as needed for muscle spasms, Disp: 20 tablet, Rfl: 1    fluticasone propionate (FLONASE) 50 mcg/actuation nasal spray, [FLUTICASONE PROPIONATE (FLONASE) 50 MCG/ACTUATION NASAL SPRAY] SHAKE LIQUID AND USE 2 SPRAYS IN EACH NOSTRIL DAILY, Disp: 48 g, Rfl: 3    ibuprofen (ADVIL/MOTRIN) 600 MG tablet, TAKE 1 TABLET(600 MG) BY MOUTH EVERY 8 HOURS AS NEEDED FOR MODERATE PAIN, Disp: 90 tablet, Rfl: 3    loratadine (CLARITIN) 10 MG tablet, TAKE 1 TABLET(10 MG) BY MOUTH DAILY, Disp: 30 tablet, Rfl: 11    losartan-hydrochlorothiazide (HYZAAR) 100-25 MG tablet, Take 1 tablet by mouth daily, Disp: 90 tablet, Rfl: 3    metoprolol succinate ER (TOPROL XL) 50 MG 24 hr tablet, Take 1 tablet (50 mg) by mouth daily, Disp: 90 tablet, Rfl: 3    nicotine (NICORETTE) 2 MG gum, Place 1 each (2 mg) inside cheek every hour as needed for smoking cessation, Disp: 90 each, Rfl: 3    QUEtiapine (SEROQUEL) 50 MG tablet, TAKE 1-2 TABLETS BY MOUTH EVERY NIGHT AT BEDTIME, Disp: 60 tablet, Rfl: 11    varenicline (CHANTIX HYACINTH) 0.5 MG X 11 & 1 MG X 42 tablet, Take 0.5 mg tab daily for 3 days, THEN 0.5 mg tab twice daily for 4 days, THEN 1 mg twice daily., Disp: 53 tablet, Rfl: 0    Allergies: Ace inhibitors, Ambien [zolpidem], Lisinopril (bulk) [lisinopril], Lithium carbonate [lithium], Risperidone, Wellbutrin [bupropion], Abilify [aripiprazole], and Depakene [valproic acid]    SURGICAL HISTORY:   Past Surgical History:   Procedure Laterality Date    LIVER BIOPSY         HISTORY:   Family History   Problem Relation Age of Onset    No Known Problems Mother         unknown cause age 53     "Alcoholism Father     Cirrhosis Father          age 66    HIV/AIDS Brother         age 28    Throat cancer Brother         smoker       SOCIAL HISTORY:   Social History     Tobacco Use    Smoking status: Every Day     Packs/day: .5     Types: Cigarettes    Smokeless tobacco: Never    Tobacco comments:     5 cigarettes a day   Substance Use Topics    Alcohol use: No       REVIEW OF SYSTEMS:  The comprehensive review of systems from the intake form was reviewed with the patient.  No fever, weight change or fatigue. No dry eyes. No oral ulcers, sore throat or voice change. No palpitations, syncope, angina or edema.  No chest pain, excessive sleepiness, shortness of breath or hemoptysis.   No abdominal pain, nausea, vomiting, diarrhea or heartburn.  No skin rash. No focal weakness or numbness. No bleeding or lymphadenopathy. No rhinitis or hives.     Exam:  On physical examination the patient appears the stated age, is in no acute distress, alert and oriented, affect is appropriate, and breathing is non-labored.  Vitals are documented in the EMR and have been reviewed:    Ht 1.753 m (5' 9\")   Wt 81.6 kg (180 lb)   BMI 26.58 kg/m    5' 9\"  Body mass index is 26.58 kg/m .    Rises from chair: Easily  Gait: Antalgic with less time in the left  Trendelenburg test:  Gains the exam table: Easily    RIGHT hip subjective: Benign  Abd:  Add:  Flexion: 100  IRF: 5  ERF: 35  Impingement test: Negative  Tenderness to palpation: No    LEFT hip subjective: Little irritated  Abd:  Add:  Flexion: 90  IRF: -5  ERF: 25  Impingement test: Positive groin  Tenderness to palpation    Distal the circulatory, motor, and sensation exam is intact with 5/5 EHL, gastroc-soleus, and tibialis anterior.  Sensation to light touch is intact.  Dorsalis pedis and posterior tibialis pulses are palpable.  There are no sores on the feet, no bruising, and no lymphedema.    Imaging:   Lopez left hip osteoarthritis    "

## 2023-11-30 NOTE — NURSING NOTE
"Reason For Visit:   Chief Complaint   Patient presents with    Consult     Dr Santana refer for Left hip OA. No injections done - Dr Santana said it wouldn't help him much  Hoos 36.36 [facility] 4       Ht 1.753 m (5' 9\")   Wt 81.6 kg (180 lb)   BMI 26.58 kg/m           Carmen Viera ATC    "

## 2023-12-04 ENCOUNTER — TELEPHONE (OUTPATIENT)
Dept: ORTHOPEDICS | Facility: CLINIC | Age: 68
End: 2023-12-04
Payer: COMMERCIAL

## 2023-12-04 DIAGNOSIS — M16.12 PRIMARY OSTEOARTHRITIS OF LEFT HIP: Primary | ICD-10-CM

## 2023-12-04 DIAGNOSIS — M87.9: ICD-10-CM

## 2023-12-04 NOTE — TELEPHONE ENCOUNTER
Socrates called back and I relayed the incidental finding of his xray to him, and also let him know Dr. Driscoll and Dr Dorantes's recommendation is to get an MRI of the hip to further evaluate. He agreed. I let him know that the schedulers will reach out and get him scheduled as soon as possible. We will call him with the results.  Carmen Viera ATC

## 2023-12-04 NOTE — TELEPHONE ENCOUNTER
Left brief voicemail for Les requsting a call back. There was an incidental finding sent to us regarding the hip xray he had done in Dr Dorantes's clinic last week. I asked that he give us a call back so that we can explain this and provide our recommendation to him.  Carmen Viera ATC

## 2023-12-26 DIAGNOSIS — F98.8 ATTENTION DEFICIT DISORDER (ADD) WITHOUT HYPERACTIVITY: ICD-10-CM

## 2023-12-27 RX ORDER — DEXTROAMPHETAMINE SACCHARATE, AMPHETAMINE ASPARTATE MONOHYDRATE, DEXTROAMPHETAMINE SULFATE AND AMPHETAMINE SULFATE 5; 5; 5; 5 MG/1; MG/1; MG/1; MG/1
40 CAPSULE, EXTENDED RELEASE ORAL EVERY MORNING
Qty: 60 CAPSULE | Refills: 0 | Status: SHIPPED | OUTPATIENT
Start: 2023-12-27 | End: 2024-01-23

## 2024-01-02 ENCOUNTER — TELEPHONE (OUTPATIENT)
Dept: ORTHOPEDICS | Facility: CLINIC | Age: 69
End: 2024-01-02
Payer: COMMERCIAL

## 2024-01-02 NOTE — TELEPHONE ENCOUNTER
Left Voicemail (1st Attempt) and Sent Mychart (1st Attempt) for the patient to call back and schedule the following:    Appointment type: MRI  Provider: Jose E  Return date: Next available

## 2024-01-23 DIAGNOSIS — F98.8 ATTENTION DEFICIT DISORDER (ADD) WITHOUT HYPERACTIVITY: ICD-10-CM

## 2024-01-23 RX ORDER — DEXTROAMPHETAMINE SACCHARATE, AMPHETAMINE ASPARTATE MONOHYDRATE, DEXTROAMPHETAMINE SULFATE AND AMPHETAMINE SULFATE 5; 5; 5; 5 MG/1; MG/1; MG/1; MG/1
40 CAPSULE, EXTENDED RELEASE ORAL EVERY MORNING
Qty: 60 CAPSULE | Refills: 0 | Status: SHIPPED | OUTPATIENT
Start: 2024-01-23 | End: 2024-02-21

## 2024-01-25 ENCOUNTER — ANCILLARY PROCEDURE (OUTPATIENT)
Dept: MRI IMAGING | Facility: CLINIC | Age: 69
End: 2024-01-25
Attending: ORTHOPAEDIC SURGERY
Payer: COMMERCIAL

## 2024-01-25 DIAGNOSIS — M87.9: ICD-10-CM

## 2024-01-25 DIAGNOSIS — M16.12 PRIMARY OSTEOARTHRITIS OF LEFT HIP: ICD-10-CM

## 2024-01-25 PROCEDURE — 73721 MRI JNT OF LWR EXTRE W/O DYE: CPT | Mod: LT | Performed by: RADIOLOGY

## 2024-01-29 ENCOUNTER — TELEPHONE (OUTPATIENT)
Dept: ORTHOPEDICS | Facility: CLINIC | Age: 69
End: 2024-01-29
Payer: COMMERCIAL

## 2024-01-29 DIAGNOSIS — M16.12 PRIMARY LOCALIZED OSTEOARTHRITIS OF LEFT HIP: Primary | ICD-10-CM

## 2024-01-29 NOTE — TELEPHONE ENCOUNTER
- A call was placed to the patient. Patient did not answer phone so a voicemail was left.     -The following information was communicated:  Schedule an appointment with Dr. Driscoll (new, in person) and same day lab appointment. Lab orders will be placed.      - Call back number to clinic was given and patient was told to call back if they had questions about the following information.    ----------------------  Getachew Driscoll MD Salmen, Ellie K, RN; Tone Dorantes MD; Marissa Haney, ADIEL; Carmen Viera ATC Patrick.  Looks like DJD to me.    I recommend pre-op Met screening lab panel with SPEP.  If negative, do L MERARY and send the femoral head for pathology eval.  If you want me to do the MERARY instead, I'd be happy to see the patient.    Thanks

## 2024-01-29 NOTE — TELEPHONE ENCOUNTER
[facility] Call Center    Phone Message    May a detailed message be left on voicemail: yes     Reason for Call: Other: Elina Socrates is calling because he would like to get a surgery done. He had his mri 1/25/24 and has quit smoking as well. Please call him. He did not want to set up another appt.     Action Taken: Other: [facility]    Travel Screening: Not Applicable

## 2024-01-29 NOTE — TELEPHONE ENCOUNTER
[facility] Call Center    Phone Message    May a detailed message be left on voicemail: yes     Reason for Call: Other: Les called to schedule an appointment with Dr. Driscoll. He did not want to do any labwork and I went to schedule him with Americo Phillips and his first availability was in April I said I could send a message to his team and he said he is going somewhere else and hung up. Just wanted to let you know.     Action Taken: Other: [facility]    Travel Screening: Not Applicable

## 2024-01-30 NOTE — TELEPHONE ENCOUNTER
[facility] for patient that he must meet with the surgeon and have lab work done in order to proceed with follow up care and treatment of his hip pain. Dr Dorantes is referring Les to Dr Driscoll or Dr Voss due to the results of his MRI. Dr Driscoll is happy to see the patient, his next opening for a new hip patient is in February. He would like Les get lab work done on the day of his appointment as well. This will help Dr Driscoll develop the most effective treatment plan for Les and they can discuss that at the office visit. Call back number was provided for Socrates to call and make that appointment with Dr Driscoll. Right now, next available is 2/22.  Carmen Viera ATC

## 2024-02-06 ENCOUNTER — TELEPHONE (OUTPATIENT)
Dept: INTERNAL MEDICINE | Facility: CLINIC | Age: 69
End: 2024-02-06

## 2024-02-06 NOTE — TELEPHONE ENCOUNTER
General Call      Reason for Call:  pt calling and asking for something for Left Hip pain, he will be seeing Dr Driscoll (ortho) on 03/06/2024  Pt needing something for now until he sees the other physician    Please advise    Pharm:  [facility]    What are your questions or concerns:  n/a    Date of last appointment with provider    Could we send this information to you in [facility] or would you prefer to receive a phone call?:   Patient would prefer a phone call   Okay to leave a detailed message?: Yes at Home number on file [phone] (home)

## 2024-02-07 ENCOUNTER — VIRTUAL VISIT (OUTPATIENT)
Dept: INTERNAL MEDICINE | Facility: CLINIC | Age: 69
End: 2024-02-07
Payer: COMMERCIAL

## 2024-02-07 ENCOUNTER — TELEPHONE (OUTPATIENT)
Dept: INTERNAL MEDICINE | Facility: CLINIC | Age: 69
End: 2024-02-07

## 2024-02-07 DIAGNOSIS — F10.21 ALCOHOL DEPENDENCE IN REMISSION (H): ICD-10-CM

## 2024-02-07 DIAGNOSIS — M16.12 PRIMARY OSTEOARTHRITIS OF LEFT HIP: Primary | ICD-10-CM

## 2024-02-07 DIAGNOSIS — Z87.891 HISTORY OF CIGARETTE SMOKING: ICD-10-CM

## 2024-02-07 PROCEDURE — 99443 PR PHYSICIAN TELEPHONE EVALUATION 21-30 MIN: CPT | Mod: 93 | Performed by: INTERNAL MEDICINE

## 2024-02-07 RX ORDER — TRAMADOL HYDROCHLORIDE 50 MG/1
50 TABLET ORAL
Qty: 10 TABLET | Refills: 0 | Status: SHIPPED | OUTPATIENT
Start: 2024-02-07 | End: 2024-02-23

## 2024-02-07 RX ORDER — MELOXICAM 15 MG/1
15 TABLET ORAL DAILY
Qty: 30 TABLET | Refills: 11 | Status: SHIPPED | OUTPATIENT
Start: 2024-02-07 | End: 2025-02-06

## 2024-02-07 RX ORDER — PREDNISONE 20 MG/1
20 TABLET ORAL EVERY MORNING
Qty: 4 TABLET | Refills: 0 | Status: SHIPPED | OUTPATIENT
Start: 2024-02-07 | End: 2024-02-11

## 2024-02-07 ASSESSMENT — PATIENT HEALTH QUESTIONNAIRE - PHQ9
10. IF YOU CHECKED OFF ANY PROBLEMS, HOW DIFFICULT HAVE THESE PROBLEMS MADE IT FOR YOU TO DO YOUR WORK, TAKE CARE OF THINGS AT HOME, OR GET ALONG WITH OTHER PEOPLE: VERY DIFFICULT
SUM OF ALL RESPONSES TO PHQ QUESTIONS 1-9: 10
SUM OF ALL RESPONSES TO PHQ QUESTIONS 1-9: 10

## 2024-02-07 NOTE — TELEPHONE ENCOUNTER
----- Message from Kodi Tejada MD sent at 2/7/2024 11:05 AM CST -----  Please schedule [facility] for my labs, and for dr ehller

## 2024-02-07 NOTE — PROGRESS NOTES
Socrates is a 68 year old male being evaluated via a billable phone visit, and would like to be contacted via the following  Home number [phone] (home)    ASSESSMENT and PLAN:  1. Primary osteoarthritis of left hip  Burst of prednisone to treat coexisting crystalline arthropathy.   reviewed.  Okay for tramadol.  Orthopedic labs noted.  Add uric acid and inflammatory markers.  Trial of meloxicam  - predniSONE (DELTASONE) 20 MG tablet; Take 1 tablet (20 mg) by mouth every morning for 4 days  Dispense: 4 tablet; Refill: 0  - traMADol (ULTRAM) 50 MG tablet; Take 1 tablet (50 mg) by mouth nightly as needed for severe pain  Dispense: 10 tablet; Refill: 0  - Uric acid; Future  - Erythrocyte sedimentation rate auto; Future  - CRP inflammation; Future  - meloxicam (MOBIC) 15 MG tablet; Take 1 tablet (15 mg) by mouth daily  Dispense: 30 tablet; Refill: 11    2. Alcohol dependence in remission (H)  Noted and congratulated.  Chart updated    3. History of cigarette smoking  Noted and congratulated last month.  Will facilitate need for surgery       Patient Instructions   Medicine list updated and clarified    Meloxicam 15 mg daily with food    Prednisone 20 mg daily for 4 days    Tramadol 50 mg at bed and every 8 hours as needed    Get labs done ordered by Dr. Driscoll    Inflammatory markers and uric acid    Make appointment to see PCP 1 week after orthopedic March 7 appointment    MRI reviewed            Return in about 4 weeks (around 3/6/2024) for in person with PCP.         CHIEF COMPLAINT:  Chief Complaint   Patient presents with    Recheck Medication     Pt looking to get a refill of pain meds for his hip.           2/7/2024    10:00 AM   Additional Questions   Roomed by Cheo CARBONE RN       HISTORY OF PRESENT ILLNESS:  Socrates is a 68 year old male contacting the clinic today via phone for complaints of severe left hip pain.  He has had slowly worsening left hip pain that worsened about 6 months ago and it again intensified  about 3 weeks ago.  MRI showed significant osteoarthritis and cystic changes.  He is scheduled to see orthopedics next month, and believes he may need surgery.  He has never had gout.   reviewed.  Has not taken narcotics.  Requests something for pain.  When treated for neck pain last year, he does not think gabapentin, prednisone or meloxicam helped.  He does request the possibility of a cortisone shot    Mood stable.  Quit drinking alcohol in 2013.  Quit smoking just last month.  Consultation with orthopedics upcoming.  Labs ordered by orthopedics.  Advised him that he has an increased chance of facilitating surgery if his labs are updated, and if he is off alcohol and cigarettes.  Discussed that he may want to schedule a preop exam with PCP after orthopedic      History of Present Illness       Reason for visit:  Hip pain medications  Symptom onset:  More than a month  Symptoms include:  Hip pain  Symptom intensity:  Severe  Symptom progression:  Worsening  Had these symptoms before:  No  What makes it worse:  Walking  What makes it better:  Pain meds    He eats 2-3 servings of fruits and vegetables daily.He consumes 1 sweetened beverage(s) daily.He exercises with enough effort to increase his heart rate 10 to 19 minutes per day.  He exercises with enough effort to increase his heart rate 7 days per week.   He is taking medications regularly.      REVIEW OF SYSTEMS:  Occasional emotional outbursts    PFSH:  Social History     Social History Narrative    Retired.  Worked , loading semi-trucks, and at the [employer].  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  No children.   Likes bicycling.      Daughter is a nurse    TOBACCO USE:  History   Smoking Status    Former    Packs/day: 0.50    Types: Cigarettes    Quit date: 12/20/2023   Smokeless Tobacco    Never       VITALS:  There were no vitals filed for this visit.  There were no vitals taken  "for this visit. Estimated body mass index is 26.58 kg/m  as calculated from the following:    Height as of 11/30/23: 1.753 m (5' 9\").    Weight as of 11/30/23: 81.6 kg (180 lb).    PHYSICAL EXAM:  (observations via Phone)  Alert and oriented, talkative    MEDICATIONS  Current Outpatient Medications   Medication Sig Dispense Refill    amLODIPine (NORVASC) 5 MG tablet Take 1 tablet (5 mg) by mouth daily 90 tablet 3    amphetamine-dextroamphetamine (ADDERALL XR) 20 MG 24 hr capsule Take 2 capsules (40 mg) by mouth every morning 60 capsule 0    losartan-hydrochlorothiazide (HYZAAR) 100-25 MG tablet Take 1 tablet by mouth daily 90 tablet 3    meloxicam (MOBIC) 15 MG tablet Take 1 tablet (15 mg) by mouth daily 30 tablet 11    metoprolol succinate ER (TOPROL XL) 50 MG 24 hr tablet Take 1 tablet (50 mg) by mouth daily 90 tablet 3    predniSONE (DELTASONE) 20 MG tablet Take 1 tablet (20 mg) by mouth every morning for 4 days 4 tablet 0    QUEtiapine (SEROQUEL) 50 MG tablet TAKE 1-2 TABLETS BY MOUTH EVERY NIGHT AT BEDTIME 60 tablet 11    traMADol (ULTRAM) 50 MG tablet Take 1 tablet (50 mg) by mouth nightly as needed for severe pain 10 tablet 0       Notes summarized:   Labs, x-rays, cardiology, GI tests reviewed: MRI left hip  Recent Labs   Lab Test 10/31/23  1253 03/20/23  1325   HGB  --  16.4   WBC  --  6.4    139   POTASSIUM 4.3 4.7   CR 1.15 1.29*   A1C 6.2*  --    TSH 3.37  --      No results found for: \"[id number]\"  Lab Results   Component Value Date    CHOL 273 03/20/2023     New orders:   Orders Placed This Encounter   Procedures    Uric acid    Erythrocyte sedimentation rate auto    CRP inflammation       Independent review of:    Patient would like to receive their AVS by Mail a copy    Kodi Tejada MD  [facility]    Phone-Visit Details  Type of service:  Phone Visit  Patient has given verbal consent to a Phone visit?  Yes  How would you like to obtain your AVS?  " [place]  Will anyone else be joining your phone visit, giving supplemental history? No    Originating location (pt location): Home    Distant Location (provider location):  Off-site    Phone Start Time: 10:43 AM  Phone End time:  11:12 AM  Conversation plus orders: 29 minutes  Dictation time:  3 minutes    The visit lasted a total of 32 minutes

## 2024-02-07 NOTE — PROGRESS NOTES
"Socrates is a 68 year old who is being evaluated via a billable telephone visit.      What phone number would you like to be contacted at? [phone]  How would you like to obtain your AVS? [place]  {PROVIDER LOCATION On-site should be selected for visits conducted from your clinic location or adjoining [facility] hospital, academic office, or other nearby [facility] building. Off-site should be selected for all other provider locations, including home:730721}  Distant Location (provider location):  {virtual location provider:583075}    {PROVIDER CHARTING PREFERENCE:395116}    Subjective   Socrates is a 68 year old, presenting for the following health issues:  Recheck Medication (Pt looking to get a refill of pain meds for his hip.)      2/7/2024    10:00 AM   Additional Questions   Roomed by Cheo CARBONE RN     History of Present Illness       Reason for visit:  Hip pain medications  Symptom onset:  More than a month  Symptoms include:  Hip pain  Symptom intensity:  Severe  Symptom progression:  Worsening  Had these symptoms before:  No  What makes it worse:  Walking  What makes it better:  Pain meds    He eats 2-3 servings of fruits and vegetables daily.He consumes 1 sweetened beverage(s) daily.He exercises with enough effort to increase his heart rate 10 to 19 minutes per day.  He exercises with enough effort to increase his heart rate 7 days per week.   He is taking medications regularly.       {SUPERLIST (Optional):092282}  {additonal problems for provider to add (Optional):232078}    {ROS Picklists (Optional):277339}      Objective    Vitals - Patient Reported  Weight (Patient Reported): 81.6 kg (180 lb)  Height (Patient Reported): 175.3 cm (5' 9\")  BMI (Based on Pt Reported Ht/Wt): 26.58  Pain Score: Severe Pain (7)  Pain Loc: Hip        Physical Exam   General: Alert and no distress //Respiratory: No audible wheeze, cough, or shortness of breath // Psychiatric:  Appropriate affect, tone, and pace of words      {Diagnostic Test " Results (Optional):895219}      Phone call duration: *** minutes  Signed Electronically by: Kodi Tejada MD  {Email feedback regarding this note to [email or web address]   :744976}

## 2024-02-21 DIAGNOSIS — F98.8 ATTENTION DEFICIT DISORDER (ADD) WITHOUT HYPERACTIVITY: ICD-10-CM

## 2024-02-21 RX ORDER — DEXTROAMPHETAMINE SACCHARATE, AMPHETAMINE ASPARTATE MONOHYDRATE, DEXTROAMPHETAMINE SULFATE AND AMPHETAMINE SULFATE 5; 5; 5; 5 MG/1; MG/1; MG/1; MG/1
40 CAPSULE, EXTENDED RELEASE ORAL EVERY MORNING
Qty: 60 CAPSULE | Refills: 0 | Status: SHIPPED | OUTPATIENT
Start: 2024-02-21 | End: 2024-03-18

## 2024-02-23 DIAGNOSIS — M16.12 PRIMARY OSTEOARTHRITIS OF LEFT HIP: ICD-10-CM

## 2024-02-23 RX ORDER — TRAMADOL HYDROCHLORIDE 50 MG/1
50 TABLET ORAL
Qty: 10 TABLET | Refills: 0 | Status: SHIPPED | OUTPATIENT
Start: 2024-02-23 | End: 2024-03-07

## 2024-02-26 NOTE — PROGRESS NOTES
[facility]  Orthopaedic Surgery, Joint Replacement Consultation  by Getachew Driscoll M.D.    Socrates Paez MRN# [id number]    YOB: 1955     Requesting physician: No ref. provider found  Clyde Day            Assessment and Plan:   Assessment:  60-year-old male with left hip osteoarthritis.     Plan:  Imaging was reviewed with the patient.  We discussed his diagnosis in detail.  We discussed our recommendation for left total hip arthroplasty, as his symptoms have not responded to conservative measures.  We discussed the risks and benefits of hip arthroplasty.  We reviewed the proposed surgical plan.  The discussion included but was not limited to the following: Infection, blood clots, blood clots to the lungs, injury to blood vessels and nerves, leg length discrepancy, dislocation, possible need for additional surgeries.  We discussed the risks and benefits of both an anterior and posterior approach.  Following this, the patient elected to proceed with a posterior approach.  We discussed the importance of dental health and the fact that he will have to schedule a appointment with his dentist prior to surgery.    Surgical plan: Left total hip arthroplasty, posterior approach.   -Dental appointment prior to surgery.  -Pre-op teaching.  -PAC referral.    The patient was seen discussed with Dr. Driscoll who agrees with the above assessment and plan.    Toña Valdes MD, PGY4  Orthopedic Surgery    Attending MD (Dr. Getachew Driscoll) Attestation :  This patient was seen and evaluated by me including a history, exam, and interpretation of all imaging and/or lab data.  I formulated the treatment plan along with either a physician's assistant (PA-C) or training physician (resident/fellow), who also saw the patient. That individual or a scribe has documented the visit in the attached note which I approve.    Getachew Driscoll MD  [facility] Family Professor  Oncology and Adult Reconstructive Surgery  Dept  Orthopaedic Surgery, [facility]  [phone] office, [phone] pager  [email or web address]           For additional information and frequently asked questions regarding joint replacements, scan the QR code image below on your phone camera or go to:  [email or web address]              History of Present Illness:   68 year old male  chief complaint of left hip pain.     The patient states he began having left hip pain approximately 1.5 years ago.  Insidious onset 10 has been progressively worsening.  Worsens with ambulation.  Improves with meloxicam.  Denies any numbness or tingling.  States he ambulates without the use of assistive devices.  He states his pain has been limiting his activities and he is no longer able to ride his mountain bike the way he would like to.  He would like to discuss proceeding with a total hip arthroplasty.    Current symptoms:  Problem: Left hip pain  Onset and duration: year and a half. Pain got worse Summer of 2023  Awakens from sleep due to sx's:  No  Precipitating Injury:  Yes  he had a fall on his left hip  Other joints or sites painful:  Yes, left knee  Prior treatments:  Non steroidal anti-inflammatory agents or aspirin: Yes  Reduced activity:  Yes  Physical therapy:  No  Chiropractic care:  No  Injections with either steroid or viscosupplementation agents:  No    Background history:  DX:  Left hip osteoarthritis.     TREATMENTS:  20-30 years ago both ankles bone spur removal, unknown location or provider           Physical Exam:     EXAMINATION pertinent findings:   PSYCH: Pleasant, healthy-appearing, alert, oriented x3, cooperative. Normal mood and affect.  VITAL SIGNS: There were no vitals taken for this visit..  Reviewed nursing intake notes.   There is no height or weight on file to calculate BMI.  RESP: non labored breathing   ABD: benign, soft, non-tender, no acute peritoneal findings  SKIN: grossly normal   LYMPHATIC:  grossly normal, no adenopathy, no extremity edema  NEURO: grossly normal , no motor deficits  VASCULAR: satisfactory perfusion of all extremities   MUSCULOSKELETAL:   Left lower extremity: skin intact.                   Data:   All laboratory data reviewed  All imaging studies reviewed by me    Imaging tests:  XR  Findings: cartilage thinning (joint space narrowing), subchondral sclerosis, subchondral cysts, osteophytes present.    Kellgren-Allen grading:  grade 4 (severe): large osteophytes, marked narrowing of joint space, severe sclerosis and definite deformity of bone ends      DATA for DOCUMENTATION:         Past Medical History:     Patient Active Problem List   Diagnosis    Psoriasis    History of hepatitis C virus infection    Hyperlipidemia LDL goal <100    Essential Hypertension    Impaired Fasting Glucose    Bipolar Disorder    ADHD, Predominantly Inattentive Type    History of cigarette smoking    Sleep disorder    Alcohol dependence in remission (H)    Primary osteoarthritis of left hip     Past Medical History:   Diagnosis Date    ADHD (attention deficit hyperactivity disorder)     Alcoholism (H)     Bipolar disorder (H)     Chronic kidney disease, stage 3 (H) 09/02/2021    Chronic viral hepatitis C (H)     treated    Depression     Essential hypertension     Hyperlipidemia LDL goal <100     Primary osteoarthritis of left hip 10/17/2023    Psoriasis     Sleep disorder 03/20/2023    Tobacco dependence syndrome        Also see scanned health assessment forms.       Past Surgical History:     Past Surgical History:   Procedure Laterality Date    LIVER BIOPSY              Social History:     Social History     Socioeconomic History    Marital status:      Spouse name: Not on file    Number of children: Not on file    Years of education: Not on file    Highest education level: Not on file   Occupational History    Not on file   Tobacco Use    Smoking status: Former     Packs/day: .5     Types:  Cigarettes     Quit date: 2023     Years since quittin.1    Smokeless tobacco: Never    Tobacco comments:     5 cigarettes a day   Vaping Use    Vaping Use: Never used   Substance and Sexual Activity    Alcohol use: No    Drug use: No    Sexual activity: Never   Other Topics Concern    Not on file   Social History Narrative    Retired.  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  No children.   Likes bicycling.      Social Determinants of Health     Financial Resource Strain: Low Risk  (10/17/2023)    Financial Resource Strain     Within the past 12 months, have you or your family members you live with been unable to get utilities (heat, electricity) when it was really needed?: No   Food Insecurity: High Risk (10/17/2023)    Food Insecurity     Within the past 12 months, did you worry that your food would run out before you got money to buy more?: Yes     Within the past 12 months, did the food you bought just not last and you didn t have money to get more?: Yes   Transportation Needs: High Risk (10/17/2023)    Transportation Needs     Within the past 12 months, has lack of transportation kept you from medical appointments, getting your medicines, non-medical meetings or appointments, work, or from getting things that you need?: Yes   Physical Activity: Not on file   Stress: Not on file   Social Connections: Not on file   Interpersonal Safety: Low Risk  (10/17/2023)    Interpersonal Safety     Do you feel physically and emotionally safe where you currently live?: Yes     Within the past 12 months, have you been hit, slapped, kicked or otherwise physically hurt by someone?: No     Within the past 12 months, have you been humiliated or emotionally abused in other ways by your partner or ex-partner?: No   Housing Stability: Low Risk  (10/17/2023)    Housing Stability     Do you have  housing? : Yes     Are you worried about losing your housing?: No            Family History:       Family History   Problem Relation Age of Onset    No Known Problems Mother         unknown cause age 53    Alcoholism Father     Cirrhosis Father          age 66    HIV/AIDS Brother         age 28    Throat cancer Brother         smoker            Medications:     Current Outpatient Medications   Medication Sig    amLODIPine (NORVASC) 5 MG tablet Take 1 tablet (5 mg) by mouth daily    amphetamine-dextroamphetamine (ADDERALL XR) 20 MG 24 hr capsule Take 2 capsules (40 mg) by mouth every morning    losartan-hydrochlorothiazide (HYZAAR) 100-25 MG tablet Take 1 tablet by mouth daily    meloxicam (MOBIC) 15 MG tablet Take 1 tablet (15 mg) by mouth daily    metoprolol succinate ER (TOPROL XL) 50 MG 24 hr tablet Take 1 tablet (50 mg) by mouth daily    QUEtiapine (SEROQUEL) 50 MG tablet TAKE 1-2 TABLETS BY MOUTH EVERY NIGHT AT BEDTIME    traMADol (ULTRAM) 50 MG tablet Take 1 tablet (50 mg) by mouth nightly as needed for severe pain     No current facility-administered medications for this visit.              Review of Systems:   A comprehensive 10 point review of systems (constitutional, ENT, cardiac, peripheral vascular, lymphatic, respiratory, GI, , Musculoskeletal, skin, Neurological) was performed and found to be negative except as described in this note.       HOOS Hip Dysfunction & Osteoarthritis Outcome Questionnaire         No data to display                       [unfilled]    KOOS Knee Survey Assessment         No data to display                       Promis 10 Assessment        2023    10:40 AM   PROMIS 10   In general, would you say your health is: Very good   In general, would you say your quality of life is: Good   In general, how would you rate your physical health? Good   In general, how would you rate your mental health, including your mood and your ability to think? Good   In general, how would  you rate your satisfaction with your social activities and relationships? Fair   In general, please rate how well you carry out your usual social activities and roles Good   To what extent are you able to carry out your everyday physical activities such as walking, climbing stairs, carrying groceries, or moving a chair? Moderately   In the past 7 days, how often have you been bothered by emotional problems such as feeling anxious, depressed, or irritable? Often   In the past 7 days, how would you rate your fatigue on average? Moderate   In the past 7 days, how would you rate your pain on average, where 0 means no pain, and 10 means worst imaginable pain? 9   In general, would you say your health is: 4   In general, would you say your quality of life is: 3   In general, how would you rate your physical health? 3   In general, how would you rate your mental health, including your mood and your ability to think? 3   In general, how would you rate your satisfaction with your social activities and relationships? 2   In general, please rate how well you carry out your usual social activities and roles. (This includes activities at home, at work and in your community, and responsibilities as a parent, child, spouse, employee, friend, etc.) 3   To what extent are you able to carry out your everyday physical activities such as walking, climbing stairs, carrying groceries, or moving a chair? 3   In the past 7 days, how often have you been bothered by emotional problems such as feeling anxious, depressed, or irritable? 4   In the past 7 days, how would you rate your fatigue on average? 3   In the past 7 days, how would you rate your pain on average, where 0 means no pain, and 10 means worst imaginable pain? 9   Global Mental Health Score 10   Global Physical Health Score 11   PROMIS TOTAL - SUBSCORES 21              Ortho [place] Knee Questionnaire         No data to display                         See intake form completed by  patient

## 2024-02-27 ENCOUNTER — LAB (OUTPATIENT)
Dept: LAB | Facility: CLINIC | Age: 69
End: 2024-02-27
Payer: COMMERCIAL

## 2024-02-27 DIAGNOSIS — M16.12 PRIMARY OSTEOARTHRITIS OF LEFT HIP: ICD-10-CM

## 2024-02-27 LAB
CRP SERPL-MCNC: 6.03 MG/L
ERYTHROCYTE [SEDIMENTATION RATE] IN BLOOD BY WESTERGREN METHOD: 10 MM/HR (ref 0–20)

## 2024-02-27 PROCEDURE — 85652 RBC SED RATE AUTOMATED: CPT

## 2024-02-27 PROCEDURE — 86140 C-REACTIVE PROTEIN: CPT

## 2024-02-27 PROCEDURE — 36415 COLL VENOUS BLD VENIPUNCTURE: CPT

## 2024-03-04 DIAGNOSIS — F31.75 BIPOLAR DISORDER, IN PARTIAL REMISSION, MOST RECENT EPISODE DEPRESSED (H): ICD-10-CM

## 2024-03-04 NOTE — TELEPHONE ENCOUNTER
DIAGNOSIS: 2nd Opinion - Hip   APPOINTMENT DATE: 03/07/2024   NOTES STATUS DETAILS   OFFICE NOTE from referring provider Internal 01/29/2024 - Tone Dorantes MD - [facility]   OFFICE NOTE from other specialist Internal 10/23/2023 - Rai Santana MD - [facility]   MRI Internal    XRAYS (IMAGES & REPORTS) Internal

## 2024-03-04 NOTE — TELEPHONE ENCOUNTER
Medication Question or Refill    Contacts         Type Contact Phone/Fax    03/04/2024 10:11 AM CST Phone (Incoming) Socrates Paez (Self) [phone] (H)            What medication are you calling about (include dose and sig)?: Concerns w/ medication 3 mo supply Quetiapin (seroquel) 50 MG       Preferred Pharmacy:   [id number] - [place], [place] - [address] [place] [address] AT [facility] & [address]  [address] [place] [address]  Phone: [phone] Fax: [phone]    [facility],  [facility] - [place], [place] - [address] [place] [address]  Phone: [phone] Fax: [phone]      Controlled Substance Agreement on file:   CSA -- Patient Level:     [Media Unavailable] Controlled Substance Agreement - Non - Opioid - Scan on 9/24/2021  1:18 PM: NON-OPIOID CONTROLLED SUBSTANCE AGREEMENT   [Media Unavailable] Controlled Substance Agreement - Non - Opioid - Scan on 2/5/2020: NON-OPIOID CONTROLLED SUBSTANCE AGREEMENT   [Media Unavailable] Controlled Substance Agreement - Non - Opioid - Scan on 10/24/2019   [Media Unavailable] Controlled Substance Agreement - Opioid - Scan on 10/22/2018   [Media Unavailable] Controlled Substance Agreement - Opioid - Scan on 4/19/2018   [Media Unavailable] Controlled Substance Agreement - Opioid - Scan on 10/2/2017   [Media Unavailable] Controlled Substance Agreement - Opioid - Scan on 7/27/2016: CONTROLLED SUBSTANCE 7/25/2016       Who prescribed the medication?: PCP Dr. Day     Do you need a refill? Yes    When did you use the medication last? Couple days ago     Patient offered an appointment? No    Do you have any questions or concerns?  No      Could we send this information to you in [facility] or would you prefer to receive a phone call?:   Patient would prefer a phone call   Okay to leave a detailed message?: Yes at Cell number on file:    Telephone Information:   Mobile [phone]      Patient is due for a OV in Feb. 2023    Pacemaker/ [employer]

## 2024-03-05 RX ORDER — QUETIAPINE FUMARATE 50 MG/1
TABLET, FILM COATED ORAL
Qty: 90 TABLET | Refills: 3 | Status: SHIPPED | OUTPATIENT
Start: 2024-03-05 | End: 2024-04-19

## 2024-03-07 ENCOUNTER — LAB (OUTPATIENT)
Dept: LAB | Facility: CLINIC | Age: 69
End: 2024-03-07
Payer: COMMERCIAL

## 2024-03-07 ENCOUNTER — PRE VISIT (OUTPATIENT)
Dept: ORTHOPEDICS | Facility: CLINIC | Age: 69
End: 2024-03-07

## 2024-03-07 ENCOUNTER — OFFICE VISIT (OUTPATIENT)
Dept: ORTHOPEDICS | Facility: CLINIC | Age: 69
End: 2024-03-07
Payer: COMMERCIAL

## 2024-03-07 DIAGNOSIS — M16.12 PRIMARY OSTEOARTHRITIS OF LEFT HIP: ICD-10-CM

## 2024-03-07 DIAGNOSIS — M10.9 URIC ACID ARTHROPATHY: ICD-10-CM

## 2024-03-07 DIAGNOSIS — M16.12 PRIMARY LOCALIZED OSTEOARTHRITIS OF LEFT HIP: ICD-10-CM

## 2024-03-07 DIAGNOSIS — M16.12 PRIMARY OSTEOARTHRITIS OF LEFT HIP: Primary | ICD-10-CM

## 2024-03-07 LAB
ALP SERPL-CCNC: 101 U/L (ref 40–150)
BUN SERPL-MCNC: 29.8 MG/DL (ref 8–23)
CALCIUM SERPL-MCNC: 10.3 MG/DL (ref 8.8–10.2)
CREAT SERPL-MCNC: 1.75 MG/DL (ref 0.67–1.17)
EGFRCR SERPLBLD CKD-EPI 2021: 42 ML/MIN/1.73M2
PHOSPHATE SERPL-MCNC: 3 MG/DL (ref 2.5–4.5)
PTH-INTACT SERPL-MCNC: 47 PG/ML (ref 15–65)
URATE SERPL-MCNC: 8.7 MG/DL (ref 3.4–7)
VIT D+METAB SERPL-MCNC: 38 NG/ML (ref 20–50)

## 2024-03-07 PROCEDURE — 82310 ASSAY OF CALCIUM: CPT | Performed by: PATHOLOGY

## 2024-03-07 PROCEDURE — 82565 ASSAY OF CREATININE: CPT | Performed by: PATHOLOGY

## 2024-03-07 PROCEDURE — 99000 SPECIMEN HANDLING OFFICE-LAB: CPT | Performed by: PATHOLOGY

## 2024-03-07 PROCEDURE — 99214 OFFICE O/P EST MOD 30 MIN: CPT | Mod: GC | Performed by: ORTHOPAEDIC SURGERY

## 2024-03-07 PROCEDURE — 84100 ASSAY OF PHOSPHORUS: CPT | Performed by: PATHOLOGY

## 2024-03-07 PROCEDURE — 84520 ASSAY OF UREA NITROGEN: CPT | Performed by: PATHOLOGY

## 2024-03-07 PROCEDURE — 82306 VITAMIN D 25 HYDROXY: CPT | Performed by: ORTHOPAEDIC SURGERY

## 2024-03-07 PROCEDURE — 84550 ASSAY OF BLOOD/URIC ACID: CPT | Performed by: PATHOLOGY

## 2024-03-07 PROCEDURE — 36415 COLL VENOUS BLD VENIPUNCTURE: CPT | Performed by: PATHOLOGY

## 2024-03-07 PROCEDURE — 83970 ASSAY OF PARATHORMONE: CPT | Performed by: PATHOLOGY

## 2024-03-07 PROCEDURE — 84075 ASSAY ALKALINE PHOSPHATASE: CPT | Performed by: PATHOLOGY

## 2024-03-07 RX ORDER — ALLOPURINOL 300 MG/1
300 TABLET ORAL 2 TIMES DAILY
Qty: 60 TABLET | Refills: 11 | Status: SHIPPED | OUTPATIENT
Start: 2024-03-07 | End: 2024-05-14

## 2024-03-07 ASSESSMENT — HOOS JR
HOOS JR TOTAL INTERVAL SCORE: 55.99
GOING UP OR DOWN STAIRS: MODERATE
RISING FROM SITTING: MILD
SITTING: MODERATE
BENDING TO THE FLOOR TO PICK UP OBJECT: MODERATE
LYING IN BED (TURNING OVER, MAINTAINING HIP POSITION): MODERATE
WALKING ON UNEVEN SURFACE: MODERATE

## 2024-03-07 NOTE — NURSING NOTE
Pre-Op Teaching was done in person at the clinic.    Teaching Flowsheet   Relevant Diagnosis: Pre-Op Teaching  Teaching Topic: L MERARY Pre-Op     Person(s) involved in teaching:   Patient and sister     Motivation Level:  Asks Questions: Yes  Eager to Learn: Yes  Cooperative: Yes  Receptive (willing/able to accept information): Yes  Any cultural factors/Yazidi beliefs that may influence understanding or compliance? No     Patient demonstrates understanding of the following:  Reason for the appointment, diagnosis and treatment plan: Yes  Knowledge of proper use of medications and conditions for which they are ordered (with special attention to potential side effects or drug interactions): Yes  Which situations necessitate calling provider and whom to contact: Yes- discussed the stoplight tool to help assist with this.      Teaching Concerns Addressed:      Proper use of surgical scrub explain: Yes    Nutritional needs and diet plan: Yes  Pain management techniques: Yes  Wound Care: Yes  How and/when to access community resources: Yes  Need for pre-op with in 30 days: Yes- asked that pre-op be done between 25-20 days before surgery is scheduled.   -I asked them to ensure they go over their daily medications during this visit and discuss what medications need to be stopped before surgery and when. If you are doing a pre-op with your PCP and they are not within the [employer] [facility], I ask them to fax it to our pre-op office. Patient verbalized understanding.      Instructional Materials Used/Given:  2 bottle of chlorhexidine, a surgery packet, and a joint booklet given to patient in clinic.      - Important contact info/ phone numbers: emphasizing clinic number and after hours number  - Map/ location of surgery  - Showering instructions  - Stop light tool  - Your Guide to Joint Replacement Booklet   - Antibiotic post op before every dentist appointment or procedure for the rest of their life.     Additionally the  following was discussed with patient:  - Ex-Wife, Dina, will be driving the patient to surgery and able to pick the patient up after discharge and staying with them for 4-5 days after surgery.  - Need to schedule a dentist appointment and get done any recommended dental work prior to surgery.   - Online joint replacement call and the use of [employer] to send educational messages. Asked patient to sign up for join class during visit and patient declined to sign up at this time and stated will sign up later. Sheet with sign up instruction given to patient.   - Told patient to check in with insurance to check about coverage.     -Next step: Schedule a surgery date and schedule a Pre-Op appointment with PAC    Time spent with patient: 15 minutes.

## 2024-03-07 NOTE — LETTER
3/7/2024         RE: Socrates Paez  [address]  [place] [address]        Dear Colleague,    Thank you for referring your patient, Scorates Paez, to the [facility] [place]. Please see a copy of my visit note below.        [facility]  Orthopaedic Surgery, Joint Replacement Consultation  by Getachew Driscoll M.D.    Socrates Paez MRN# [id number]    YOB: 1955     Requesting physician: No ref. provider found  Clyde Day            Assessment and Plan:   Assessment:  60-year-old male with left hip osteoarthritis.     Plan:  Imaging was reviewed with the patient.  We discussed his diagnosis in detail.  We discussed our recommendation for left total hip arthroplasty, as his symptoms have not responded to conservative measures.  We discussed the risks and benefits of hip arthroplasty.  We reviewed the proposed surgical plan.  The discussion included but was not limited to the following: Infection, blood clots, blood clots to the lungs, injury to blood vessels and nerves, leg length discrepancy, dislocation, possible need for additional surgeries.  We discussed the risks and benefits of both an anterior and posterior approach.  Following this, the patient elected to proceed with a posterior approach.  We discussed the importance of dental health and the fact that he will have to schedule a appointment with his dentist prior to surgery.    Surgical plan: Left total hip arthroplasty, posterior approach.   -Dental appointment prior to surgery.  -Pre-op teaching.  -PAC referral.    The patient was seen discussed with Dr. Driscoll who agrees with the above assessment and plan.    Toña Valdes MD, PGY4  Orthopedic Surgery    Attending MD (Dr. Getachew Driscoll) Attestation :  This patient was seen and evaluated by me including a history, exam, and interpretation of all imaging and/or lab data.  I formulated the treatment plan along with either a physician's  assistant (IRLANDA) or training physician (resident/fellow), who also saw the patient. That individual or a scribe has documented the visit in the attached note which I approve.    MD Rosa Rincon Family Professor  Oncology and Adult Reconstructive Surgery  Dept Orthopaedic Surgery, [facility]  [phone] office, [phone] pager  [email or web address]           For additional information and frequently asked questions regarding joint replacements, scan the QR code image below on your phone camera or go to:  [email or web address]              History of Present Illness:   68 year old male  chief complaint of left hip pain.     The patient states he began having left hip pain approximately 1.5 years ago.  Insidious onset 10 has been progressively worsening.  Worsens with ambulation.  Improves with meloxicam.  Denies any numbness or tingling.  States he ambulates without the use of assistive devices.  He states his pain has been limiting his activities and he is no longer able to ride his mountain bike the way he would like to.  He would like to discuss proceeding with a total hip arthroplasty.    Current symptoms:  Problem: Left hip pain  Onset and duration: year and a half. Pain got worse Summer of 2023  Awakens from sleep due to sx's:  No  Precipitating Injury:  Yes  he had a fall on his left hip  Other joints or sites painful:  Yes, left knee  Prior treatments:  Non steroidal anti-inflammatory agents or aspirin: Yes  Reduced activity:  Yes  Physical therapy:  No  Chiropractic care:  No  Injections with either steroid or viscosupplementation agents:  No    Background history:  DX:  Left hip osteoarthritis.     TREATMENTS:  20-30 years ago both ankles bone spur removal, unknown location or provider           Physical Exam:     EXAMINATION pertinent findings:   PSYCH: Pleasant, healthy-appearing, alert, oriented x3, cooperative. Normal mood and  affect.  VITAL SIGNS: There were no vitals taken for this visit..  Reviewed nursing intake notes.   There is no height or weight on file to calculate BMI.  RESP: non labored breathing   ABD: benign, soft, non-tender, no acute peritoneal findings  SKIN: grossly normal   LYMPHATIC: grossly normal, no adenopathy, no extremity edema  NEURO: grossly normal , no motor deficits  VASCULAR: satisfactory perfusion of all extremities   MUSCULOSKELETAL:   Left lower extremity: skin intact.                   Data:   All laboratory data reviewed  All imaging studies reviewed by me    Imaging tests:  XR  Findings: cartilage thinning (joint space narrowing), subchondral sclerosis, subchondral cysts, osteophytes present.    Kellgren-Allen grading:  grade 4 (severe): large osteophytes, marked narrowing of joint space, severe sclerosis and definite deformity of bone ends      DATA for DOCUMENTATION:         Past Medical History:     Patient Active Problem List   Diagnosis    Psoriasis    History of hepatitis C virus infection    Hyperlipidemia LDL goal <100    Essential Hypertension    Impaired Fasting Glucose    Bipolar Disorder    ADHD, Predominantly Inattentive Type    History of cigarette smoking    Sleep disorder    Alcohol dependence in remission (H)    Primary osteoarthritis of left hip     Past Medical History:   Diagnosis Date    ADHD (attention deficit hyperactivity disorder)     Alcoholism (H)     Bipolar disorder (H)     Chronic kidney disease, stage 3 (H) 09/02/2021    Chronic viral hepatitis C (H)     treated    Depression     Essential hypertension     Hyperlipidemia LDL goal <100     Primary osteoarthritis of left hip 10/17/2023    Psoriasis     Sleep disorder 03/20/2023    Tobacco dependence syndrome        Also see scanned health assessment forms.       Past Surgical History:     Past Surgical History:   Procedure Laterality Date    LIVER BIOPSY              Social History:     Social History     Socioeconomic  History    Marital status:      Spouse name: Not on file    Number of children: Not on file    Years of education: Not on file    Highest education level: Not on file   Occupational History    Not on file   Tobacco Use    Smoking status: Former     Packs/day: .5     Types: Cigarettes     Quit date: 2023     Years since quittin.1    Smokeless tobacco: Never    Tobacco comments:     5 cigarettes a day   Vaping Use    Vaping Use: Never used   Substance and Sexual Activity    Alcohol use: No    Drug use: No    Sexual activity: Never   Other Topics Concern    Not on file   Social History Narrative    Retired.  Worked , loading semi-trucks, and at the [employer].  No alcohol use, sober since , he feels adderall helps him avoid alcohol.  No children.   Likes bicycling.      Social Determinants of Health     Financial Resource Strain: Low Risk  (10/17/2023)    Financial Resource Strain     Within the past 12 months, have you or your family members you live with been unable to get utilities (heat, electricity) when it was really needed?: No   Food Insecurity: High Risk (10/17/2023)    Food Insecurity     Within the past 12 months, did you worry that your food would run out before you got money to buy more?: Yes     Within the past 12 months, did the food you bought just not last and you didn t have money to get more?: Yes   Transportation Needs: High Risk (10/17/2023)    Transportation Needs     Within the past 12 months, has lack of transportation kept you from medical appointments, getting your medicines, non-medical meetings or appointments, work, or from getting things that you need?: Yes   Physical Activity: Not on file   Stress: Not on file   Social Connections: Not on file   Interpersonal Safety: Low Risk  (10/17/2023)    Interpersonal Safety     Do you feel physically and emotionally safe where you currently live?: Yes     Within the past 12 months, have you  been hit, slapped, kicked or otherwise physically hurt by someone?: No     Within the past 12 months, have you been humiliated or emotionally abused in other ways by your partner or ex-partner?: No   Housing Stability: Low Risk  (10/17/2023)    Housing Stability     Do you have housing? : Yes     Are you worried about losing your housing?: No            Family History:       Family History   Problem Relation Age of Onset    No Known Problems Mother         unknown cause age 53    Alcoholism Father     Cirrhosis Father          age 66    HIV/AIDS Brother         age 28    Throat cancer Brother         smoker            Medications:     Current Outpatient Medications   Medication Sig    amLODIPine (NORVASC) 5 MG tablet Take 1 tablet (5 mg) by mouth daily    amphetamine-dextroamphetamine (ADDERALL XR) 20 MG 24 hr capsule Take 2 capsules (40 mg) by mouth every morning    losartan-hydrochlorothiazide (HYZAAR) 100-25 MG tablet Take 1 tablet by mouth daily    meloxicam (MOBIC) 15 MG tablet Take 1 tablet (15 mg) by mouth daily    metoprolol succinate ER (TOPROL XL) 50 MG 24 hr tablet Take 1 tablet (50 mg) by mouth daily    QUEtiapine (SEROQUEL) 50 MG tablet TAKE 1-2 TABLETS BY MOUTH EVERY NIGHT AT BEDTIME    traMADol (ULTRAM) 50 MG tablet Take 1 tablet (50 mg) by mouth nightly as needed for severe pain     No current facility-administered medications for this visit.              Review of Systems:   A comprehensive 10 point review of systems (constitutional, ENT, cardiac, peripheral vascular, lymphatic, respiratory, GI, , Musculoskeletal, skin, Neurological) was performed and found to be negative except as described in this note.       HOOS Hip Dysfunction & Osteoarthritis Outcome Questionnaire         No data to display                       [unfilled]    KOOS Knee Survey Assessment         No data to display                       Promis 10 Assessment        2023    10:40 AM   PROMIS 10   In general, would you  say your health is: Very good   In general, would you say your quality of life is: Good   In general, how would you rate your physical health? Good   In general, how would you rate your mental health, including your mood and your ability to think? Good   In general, how would you rate your satisfaction with your social activities and relationships? Fair   In general, please rate how well you carry out your usual social activities and roles Good   To what extent are you able to carry out your everyday physical activities such as walking, climbing stairs, carrying groceries, or moving a chair? Moderately   In the past 7 days, how often have you been bothered by emotional problems such as feeling anxious, depressed, or irritable? Often   In the past 7 days, how would you rate your fatigue on average? Moderate   In the past 7 days, how would you rate your pain on average, where 0 means no pain, and 10 means worst imaginable pain? 9   In general, would you say your health is: 4   In general, would you say your quality of life is: 3   In general, how would you rate your physical health? 3   In general, how would you rate your mental health, including your mood and your ability to think? 3   In general, how would you rate your satisfaction with your social activities and relationships? 2   In general, please rate how well you carry out your usual social activities and roles. (This includes activities at home, at work and in your community, and responsibilities as a parent, child, spouse, employee, friend, etc.) 3   To what extent are you able to carry out your everyday physical activities such as walking, climbing stairs, carrying groceries, or moving a chair? 3   In the past 7 days, how often have you been bothered by emotional problems such as feeling anxious, depressed, or irritable? 4   In the past 7 days, how would you rate your fatigue on average? 3   In the past 7 days, how would you rate your pain on average, where  0 means no pain, and 10 means worst imaginable pain? 9   Global Mental Health Score 10   Global Physical Health Score 11   PROMIS TOTAL - SUBSCORES 21              Ortho [place] Knee Questionnaire         No data to display                         See intake form completed by patient

## 2024-03-14 ENCOUNTER — TELEPHONE (OUTPATIENT)
Dept: ORTHOPEDICS | Facility: CLINIC | Age: 69
End: 2024-03-14
Payer: COMMERCIAL

## 2024-03-14 NOTE — TELEPHONE ENCOUNTER
Phoned patient to schedule surgery with Dr. Driscoll. Message left with direct number to call back at their convenience.    Jennifer  Perioperative   [phone]

## 2024-03-15 DIAGNOSIS — Z96.642 STATUS POST TOTAL REPLACEMENT OF LEFT HIP: Primary | ICD-10-CM

## 2024-03-18 DIAGNOSIS — F98.8 ATTENTION DEFICIT DISORDER (ADD) WITHOUT HYPERACTIVITY: ICD-10-CM

## 2024-03-18 NOTE — TELEPHONE ENCOUNTER
FUTURE VISIT INFORMATION      SURGERY INFORMATION:  Date: 6/14/24  Location: ur or  Surgeon:  Getachew Driscoll MD   Anesthesia Type:  choice  Procedure: ARTHROPLASTY, LEFT HIP, TOTAL   Consult: ov 3/7/24    RECORDS REQUESTED FROM:       Primary Care Provider:   [facility] Day [facility]   [facility]    Pertinent Medical History: hypertension

## 2024-03-19 RX ORDER — DEXTROAMPHETAMINE SACCHARATE, AMPHETAMINE ASPARTATE MONOHYDRATE, DEXTROAMPHETAMINE SULFATE AND AMPHETAMINE SULFATE 5; 5; 5; 5 MG/1; MG/1; MG/1; MG/1
40 CAPSULE, EXTENDED RELEASE ORAL EVERY MORNING
Qty: 60 CAPSULE | Refills: 0 | Status: SHIPPED | OUTPATIENT
Start: 2024-03-19 | End: 2024-04-16

## 2024-03-21 ENCOUNTER — TELEPHONE (OUTPATIENT)
Dept: INTERNAL MEDICINE | Facility: CLINIC | Age: 69
End: 2024-03-21
Payer: COMMERCIAL

## 2024-03-21 DIAGNOSIS — M16.12 PRIMARY OSTEOARTHRITIS OF LEFT HIP: ICD-10-CM

## 2024-03-21 NOTE — TELEPHONE ENCOUNTER
Medication Question or Refill    Contacts         Type Contact Phone/Fax    03/21/2024 03:28 PM CDT Phone (Incoming) Socrates Paez (Self) [phone] (H)            What medication are you calling about (include dose and sig)?: patient is trying to stay active and is having pain in the left hip and groin area.  He is scheduled for surgery in a couple of months.    He is wondering if he can get a refill of :   Disp Refills Start End HIEU   traMADol (ULTRAM) 50 MG tablet (Discontinued) 10 tablet 0 2/23/2024 3/7/2024 No   Sig - Route: Take 1 tablet (50 mg) by mouth nightly as needed for severe pain - Oral   Sent to pharmacy as: traMADol HCl 50 MG Oral Tablet (ULTRAM)   Class: E-Prescribe   Reason for Discontinue: Therapy completed (No AVS)   Order: [id number]   E-Prescribing Status: Receipt confirmed by pharmacy (2/23/2024 12:58 PM CST)       Preferred Pharmacy:  [id number] - [place], [place] - [address] [place] [address] & [address] [place] [address]  Phone: [phone] Fax: [phone]    Controlled Substance Agreement on file:   CSA -- Patient Level:     [Media Unavailable] Controlled Substance Agreement - Non - Opioid - Scan on 9/24/2021  1:18 PM: NON-OPIOID CONTROLLED SUBSTANCE AGREEMENT   [Media Unavailable] Controlled Substance Agreement - Non - Opioid - Scan on 2/5/2020: NON-OPIOID CONTROLLED SUBSTANCE AGREEMENT   [Media Unavailable] Controlled Substance Agreement - Non - Opioid - Scan on 10/24/2019   [Media Unavailable] Controlled Substance Agreement - Opioid - Scan on 10/22/2018   [Media Unavailable] Controlled Substance Agreement - Opioid - Scan on 4/19/2018   [Media Unavailable] Controlled Substance Agreement - Opioid - Scan on 10/2/2017   [Media Unavailable] Controlled Substance Agreement - Opioid - Scan on 7/27/2016: CONTROLLED SUBSTANCE 7/25/2016       Who prescribed the medication?: Dr Tejada    Do you need a refill? Yes    When did you use the  medication last? Couple of months ago    Patient offered an appointment? No    Do you have any questions or concerns?  No      Could we send this information to you in Jogli or would you prefer to receive a phone call?:   Patient would prefer a phone call   Okay to leave a detailed message?: Yes at Home number on file [phone] (home)

## 2024-03-21 NOTE — RESULT ENCOUNTER NOTE
Dear Socrates Paez:    A review of some of your blood testing reveals some abnormal results, in particular your kidney function test.  You should discuss these with your primary care physician.    Best regards,    MD Rosa Rincon Family Professor  Oncology and Adult Reconstructive Surgery  Dept Orthopaedic Surgery, [facility]  [phone] office  [email or web address]

## 2024-03-25 RX ORDER — TRAMADOL HYDROCHLORIDE 50 MG/1
50 TABLET ORAL
Qty: 30 TABLET | Refills: 3 | Status: SHIPPED | OUTPATIENT
Start: 2024-03-25

## 2024-04-03 ENCOUNTER — VIRTUAL VISIT (OUTPATIENT)
Dept: INTERNAL MEDICINE | Facility: CLINIC | Age: 69
End: 2024-04-03
Payer: COMMERCIAL

## 2024-04-03 DIAGNOSIS — F10.21 ALCOHOL DEPENDENCE IN REMISSION (H): ICD-10-CM

## 2024-04-03 DIAGNOSIS — I10 ESSENTIAL HYPERTENSION: ICD-10-CM

## 2024-04-03 DIAGNOSIS — M16.12 PRIMARY OSTEOARTHRITIS OF LEFT HIP: Primary | ICD-10-CM

## 2024-04-03 PROCEDURE — 99213 OFFICE O/P EST LOW 20 MIN: CPT | Mod: 95 | Performed by: INTERNAL MEDICINE

## 2024-04-03 NOTE — PROGRESS NOTES
"Socrates is a 68 year old who is being evaluated via a billable video visit.    How would you like to obtain your AVS? MyChart  If the video visit is dropped, the invitation should be resent by: Text to cell phone: [phone]  Will anyone else be joining your video visit? No    Assessment & Plan     Primary osteoarthritis of left hip  Plans are in place for MERARY.  He will be seen for preoperative evaluation prior.     Essential Hypertension  He is taking his medications as documented.      Alcohol dependence in remission   Remains sober.       BMI  Estimated body mass index is 26.58 kg/m  as calculated from the following:    Height as of 11/30/23: 1.753 m (5' 9\").    Weight as of 11/30/23: 81.6 kg (180 lb).     Follow up as scheduled.     Subjective   Socrates is a 68 year old, presenting for the following health issues:  Follow Up (Primary osteoarthritis of left hip and medication follow up )  He is happy that total hip arthroplasty is planned.  He remains sober, and is taking his medications.  He is not having unusual dyspnea or cough, or bowel or bladder issues.         4/3/2024     9:33 AM   Additional Questions   Roomed by DEE Bliss   Accompanied by alone     Video Start Time: 10:02 AM    History of Present Illness       Reason for visit:  Follow up    He eats 2-3 servings of fruits and vegetables daily.He consumes 1 sweetened beverage(s) daily.He exercises with enough effort to increase his heart rate 10 to 19 minutes per day.  He exercises with enough effort to increase his heart rate 4 days per week.   He is taking medications regularly.     Objective    Alert and oriented in NAD    Video-Visit Details    Type of service:  Video Visit   Video End Time:10:09 AM  Originating Location (pt. Location): Home  Distant Location (provider location):  On-site  Platform used for Video Visit: Chema  Signed Electronically by: Clyde Day MD    "

## 2024-04-16 DIAGNOSIS — F98.8 ATTENTION DEFICIT DISORDER (ADD) WITHOUT HYPERACTIVITY: ICD-10-CM

## 2024-04-16 RX ORDER — DEXTROAMPHETAMINE SACCHARATE, AMPHETAMINE ASPARTATE MONOHYDRATE, DEXTROAMPHETAMINE SULFATE AND AMPHETAMINE SULFATE 5; 5; 5; 5 MG/1; MG/1; MG/1; MG/1
40 CAPSULE, EXTENDED RELEASE ORAL EVERY MORNING
Qty: 60 CAPSULE | Refills: 0 | Status: SHIPPED | OUTPATIENT
Start: 2024-04-16 | End: 2024-06-25

## 2024-04-16 NOTE — TELEPHONE ENCOUNTER
Patient calling to get a medication refill on medication(s) attached    Controlled Substance Refill Request for    amphetamine-dextroamphetamine (ADDERALL XR) 20 MG 24 hr capsule     Last refill: 3/19/2024    Last clinic visit: 4/3/2024    Clinic visit frequency required:   Next appt: No Future appt

## 2024-04-19 ENCOUNTER — VIRTUAL VISIT (OUTPATIENT)
Dept: INTERNAL MEDICINE | Facility: CLINIC | Age: 69
End: 2024-04-19
Payer: COMMERCIAL

## 2024-04-19 ENCOUNTER — TELEPHONE (OUTPATIENT)
Dept: INTERNAL MEDICINE | Facility: CLINIC | Age: 69
End: 2024-04-19

## 2024-04-19 DIAGNOSIS — F51.01 PRIMARY INSOMNIA: ICD-10-CM

## 2024-04-19 DIAGNOSIS — M16.12 PRIMARY OSTEOARTHRITIS OF LEFT HIP: Primary | ICD-10-CM

## 2024-04-19 DIAGNOSIS — M10.9 URIC ACID ARTHROPATHY: ICD-10-CM

## 2024-04-19 DIAGNOSIS — I10 ESSENTIAL HYPERTENSION: ICD-10-CM

## 2024-04-19 DIAGNOSIS — F31.75 BIPOLAR DISORDER, IN PARTIAL REMISSION, MOST RECENT EPISODE DEPRESSED (H): ICD-10-CM

## 2024-04-19 DIAGNOSIS — F98.8 ATTENTION DEFICIT DISORDER (ADD) WITHOUT HYPERACTIVITY: ICD-10-CM

## 2024-04-19 PROCEDURE — 99214 OFFICE O/P EST MOD 30 MIN: CPT | Mod: 93 | Performed by: INTERNAL MEDICINE

## 2024-04-19 RX ORDER — LOSARTAN POTASSIUM 100 MG/1
100 TABLET ORAL DAILY
Qty: 90 TABLET | Refills: 3 | Status: SHIPPED | OUTPATIENT
Start: 2024-04-19 | End: 2024-05-14

## 2024-04-19 RX ORDER — PREDNISONE 10 MG/1
10 TABLET ORAL EVERY MORNING
Qty: 5 TABLET | Refills: 0 | Status: SHIPPED | OUTPATIENT
Start: 2024-04-19 | End: 2024-06-04

## 2024-04-19 RX ORDER — QUETIAPINE FUMARATE 50 MG/1
100 TABLET, FILM COATED ORAL AT BEDTIME
COMMUNITY
Start: 2024-04-19 | End: 2024-04-19

## 2024-04-19 RX ORDER — TRAZODONE HYDROCHLORIDE 50 MG/1
50 TABLET, FILM COATED ORAL
Qty: 30 TABLET | Refills: 3 | Status: SHIPPED | OUTPATIENT
Start: 2024-04-19 | End: 2024-05-14

## 2024-04-19 NOTE — PROGRESS NOTES
OFFICE VISIT--Phone    Les is a 68 year old male being evaluated via a billable phone visit, and would like to be contacted via the following  Home number [phone] (home)    ASSESSMENT and PLAN:  1. 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  Trial of prednisone but this time slightly lower dose.  Improve uric acid component by cessation of hydrochlorothiazide  - losartan (COZAAR) 100 MG tablet; Take 1 tablet (100 mg) by mouth daily  Dispense: 90 tablet; Refill: 3  - predniSONE (DELTASONE) 10 MG tablet; Take 1 tablet (10 mg) by mouth every morning for 5 days  Dispense: 5 tablet; Refill: 0    2. Uric acid arthropathy  Continue high-dose allopurinol.  Discontinue hydrochlorothiazide.  Confirm no aspirin.  Recheck after stable  - losartan (COZAAR) 100 MG tablet; Take 1 tablet (100 mg) by mouth daily  Dispense: 90 tablet; Refill: 3  - predniSONE (DELTASONE) 10 MG tablet; Take 1 tablet (10 mg) by mouth every morning for 5 days  Dispense: 5 tablet; Refill: 0  - Uric acid; Future  - Basic metabolic panel; Future    3. Bipolar disorder, in partial remission, most recent episode depressed (H)  Question versus ADD.  Continue Adderall.  Trial of trazodone to replace Seroquel.  Consider lamotrigine  - traZODone (DESYREL) 50 MG tablet; Take 1 tablet (50 mg) by mouth nightly as needed for sleep  Dispense: 30 tablet; Refill: 3    4. Essential Hypertension  - Uric acid; Future  - Basic metabolic panel; Future    5. Attention deficit disorder (ADD) without hyperactivity    6. Primary insomnia  - traZODone (DESYREL) 50 MG tablet; Take 1 tablet (50 mg) by mouth nightly as needed for sleep  Dispense: 30 tablet; Refill: 3       Patient Instructions   Allopurinol 300 mg twice daily-initiated March 7    Discontinue losartan/HCTZ    Losartan 100 mg daily    Continue amlodipine and metoprolol    Continue meloxicam    Confirm taking no aspirin    Discontinue  quetiapine    Trazodone 50 mg at bed as needed    Check uric acid and basic metabolic profile 2 to 4 weeks            Return in about 4 months (around 8/19/2024) for using a phone visit.         CHIEF COMPLAINT:  Chief Complaint   Patient presents with    Medication Request     Wants to wean himself off seroquel  Wants prednisone           4/19/2024    11:55 AM   Additional Questions   Roomed by ADIEL Cano       HISTORY OF PRESENT ILLNESS:  Socrates is a 68 year old male contacting the clinic today via phone for review of arthritis.  When we spoke last in February, he was advised to have labs done.  He was placed on meloxicam and prednisone.  He reports marked improvement within 24 hours of the prednisone and improvement in his back and hip pain lasted for approximately 4 weeks.  It has recently started to return.  In the interim he has been mobile, bicycling, and feeling very well.  At the same time, he is being prepared for left hip surgery.  He wishes to try prednisone again.  He is taking meloxicam daily without stomach upset.  Creatinine was a bit elevated    Uric acid found to be elevated at 8.7 and placed on allopurinol twice daily.  He indicates that this has helped his urination, frequency and smell.  Discussed role of uric acid and arthritis.  Also confirm that he does not take aspirin.  Discussed that hydrochlorothiazide can increase uric acid also    Blood pressure controlled on for medication.  Discussed trying without hydrochlorothiazide given uric acid component    Psychiatric diagnosis unclear.  Chart indicates bipolar, anxiety, depression, and ADHD.  He would like to taper off Seroquel as he believes it is causing weight gain.  Discussed trial of trazodone to replace.  Consider trial of lamotrigine to stabilize mood.  Reports that Adderall is very helpful    History of Present Illness       Reason for visit:  Prednisone  refill    He eats 2-3 servings of fruits and vegetables daily.He consumes 1  "sweetened beverage(s) daily.He exercises with enough effort to increase his heart rate 20 to 29 minutes per day.  He exercises with enough effort to increase his heart rate 4 days per week.   He is taking medications regularly.      REVIEW OF SYSTEMS:  Occasional peripheral edema    Today's PHQ-2 Score:       2/7/2024    10:04 AM   PHQ-2 ( 1999 [employer])   Q1: Little interest or pleasure in doing things 3   Q2: Feeling down, depressed or hopeless 2   PHQ-2 Score 5   Q1: Little interest or pleasure in doing things Nearly every day   Q2: Feeling down, depressed or hopeless More than half the days   PHQ-2 Score 5       PFSH:  Social History     Social History Narrative    Retired.  Worked , loading semi-trucks, and at the "[employer]  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  No children.   Likes bicycling.        TOBACCO USE:  History   Smoking Status    Former    Packs/day: 0.50    Types: Cigarettes    Quit date: 12/20/2023   Smokeless Tobacco    Never       VITALS:  There were no vitals filed for this visit.  There were no vitals taken for this visit.   Estimated body mass index is 26.58 kg/m  as calculated from the following:    Height as of 11/30/23: 1.753 m (5' 9\").    Weight as of 11/30/23: 81.6 kg (180 lb).    PHYSICAL EXAM:  (observations via Phone)  Alert and oriented.  Talkative and tangential    MEDICATIONS  Current Outpatient Medications   Medication Sig Dispense Refill    allopurinol (ZYLOPRIM) 300 MG tablet Take 1 tablet (300 mg) by mouth 2 times daily 60 tablet 11    amLODIPine (NORVASC) 5 MG tablet Take 1 tablet (5 mg) by mouth daily 90 tablet 3    amphetamine-dextroamphetamine (ADDERALL XR) 20 MG 24 hr capsule Take 2 capsules (40 mg) by mouth every morning 60 capsule 0    losartan (COZAAR) 100 MG tablet Take 1 tablet (100 mg) by mouth daily 90 tablet 3    meloxicam (MOBIC) 15 MG tablet Take 1 tablet (15 mg) by mouth daily 30 tablet " "11    metoprolol succinate ER (TOPROL XL) 50 MG 24 hr tablet Take 1 tablet (50 mg) by mouth daily 90 tablet 3    predniSONE (DELTASONE) 10 MG tablet Take 1 tablet (10 mg) by mouth every morning for 5 days 5 tablet 0    traMADol (ULTRAM) 50 MG tablet Take 1 tablet (50 mg) by mouth nightly as needed for severe pain 30 tablet 3    traZODone (DESYREL) 50 MG tablet Take 1 tablet (50 mg) by mouth nightly as needed for sleep 30 tablet 3       Notes summarized: Orthopedic note March  Labs, x-rays, cardiology, GI tests reviewed: Increased creatinine on meloxicam.  Increased uric acid.  Elevated C-reactive protein  Recent Labs   Lab Test 03/07/24  0759 02/27/24  0705 10/31/23  1253 03/20/23  1325   HGB  --   --   --  16.4   WBC  --   --   --  6.4   NA  --   --  137 139   POTASSIUM  --   --  4.3 4.7   CR 1.75*  --  1.15 1.29*   A1C  --   --  6.2*  --    URIC 8.7*  --   --   --    TSH  --   --  3.37  --    VITDT 38  --   --   --    SED  --  10  --   --    CRPI  --  6.03*  --   --      No results found for: \"[id number]\"  Lab Results   Component Value Date    CHOL 273 03/20/2023     New orders:   Orders Placed This Encounter   Procedures    Uric acid    Basic metabolic panel       Independent review of:    Patient would like to receive their AVS by [facility]    Phone-Visit Details  Type of service:  Phone Visit  Patient has given verbal consent to a Phone visit?  Yes  How would you like to obtain your AVS?  [facility]  Will anyone else be joining your phone visit, giving supplemental history? No    Originating location (pt location): Home    Distant Location (provider location):  Off-site    Phone Start Time: 12:43 PM  Phone End time:  1:15 PM  Conversation plus orders: 32 minutes  Dictation time:  3 minutes    The visit lasted a total of 35 minutes     Kodi Tejada MD  [facility]"

## 2024-04-19 NOTE — PATIENT INSTRUCTIONS
Allopurinol 300 mg twice daily-initiated March 7    Discontinue losartan/HCTZ    Losartan 100 mg daily    Continue amlodipine and metoprolol    Continue meloxicam    Confirm taking no aspirin    Discontinue quetiapine    Trazodone 50 mg at bed as needed    Check uric acid and basic metabolic profile 2 to 4 weeks

## 2024-04-23 ENCOUNTER — TELEPHONE (OUTPATIENT)
Dept: INTERNAL MEDICINE | Facility: CLINIC | Age: 69
End: 2024-04-23
Payer: COMMERCIAL

## 2024-04-23 NOTE — TELEPHONE ENCOUNTER
General Call    Contacts         Type Contact Phone/Fax    04/23/2024 10:50 AM CDT Phone (Incoming) Socrates Paez (Self) [phone] (H)          Reason for Call:    allopurinol (ZYLOPRIM) 300 MG tablet 60 tablet 11 3/7/2024 3/7/2025 No   Sig - Route: Take 1 tablet (300 mg) by mouth 2 times daily - Oral   Sent to pharmacy as: Allopurinol 300 MG Oral Tablet (ZYLOPRIM)       What are your questions or concerns:  Pt states stomach hurts, passing a lot of gas but cannot make a bowel movement havent been feeling good for the past week. Pt wants to stop taking this medication - please advise and reach out to pt with next steps     Date of last appointment with provider: 04/19/24    Could we send this information to you in [facility] [place] or would you prefer to receive a phone call?:   Patient would prefer a phone call   Okay to leave a detailed message?: Yes at Cell number on file:    Telephone Information:   Mobile [phone]

## 2024-04-23 NOTE — CONFIDENTIAL NOTE
I would recommend decreasing the allopurinol to just once daily but continue on it.  Uric acid level was 8.7    Prednisone can cause swelling    At her last visit we stopped hydrochlorothiazide.  This is probably the reason.  Recommend resuming hydrochlorothiazide 25 mg or resume losartan HCTZ if he still has plenty

## 2024-04-23 NOTE — TELEPHONE ENCOUNTER
"Patient states his hip is feeling much better since starting prednisone after visit with Dr. Tejada on 4/19/24.  Patient states the last few days he has not been feeling well.  Patient hasn't been hungry and his stomach has been uneasy.  Patient had one episode of loose stool yesterday.  Patient states bowel movements x2 today were more regular.  Patient states he was short of breath earlier today but denies any issue at this time.  Patient speaking in full sentences without pause, labored breathing or audible wheezing.  Patient states he is \"feeling fine now\".    Patient reports feet have been swollen since he started allopurinol in March.  Edema is not worsening, but still there. Patient states he is currently taking 300 mg allopurinol. Patient doesn't feel the medication helps with anything and feels 600 mg is too high. Patient would like to stop the medication completely.      Patient states his eyes are a little red and itchy and he has been using eye drops.  Patient states he is due for an eye appointment and will follow up with ophthalmologist.    Patient reports he is now feeling \"pretty good\".  Patient denies needing anything at this time.     Writer advised patient to return call to clinic if symptoms return.  Patient advised to go to ED/call 911 if he develops SOB or difficulty breathing.   "

## 2024-04-24 NOTE — TELEPHONE ENCOUNTER
Attempted contact with patient for follow up of call and provider message. Left voice message requesting call back. Please see provider message regarding medication.

## 2024-04-25 NOTE — TELEPHONE ENCOUNTER
Spoke with patient and relayed information below from Dr Tejada. Patient verbalized understanding and has no further questions.

## 2024-04-26 ENCOUNTER — TELEPHONE (OUTPATIENT)
Dept: INTERNAL MEDICINE | Facility: CLINIC | Age: 69
End: 2024-04-26
Payer: COMMERCIAL

## 2024-04-26 DIAGNOSIS — I10 ESSENTIAL HYPERTENSION: ICD-10-CM

## 2024-04-26 RX ORDER — LOSARTAN POTASSIUM AND HYDROCHLOROTHIAZIDE 25; 100 MG/1; MG/1
1 TABLET ORAL DAILY
Qty: 90 TABLET | Refills: 3 | Status: SHIPPED | OUTPATIENT
Start: 2024-04-26

## 2024-04-26 NOTE — TELEPHONE ENCOUNTER
Spoke with patient and relayed information below from Dr Cisneros. Patient verbalized understanding and has no further questions.

## 2024-04-26 NOTE — TELEPHONE ENCOUNTER
Medication Question or Refill    Contacts         Type Contact Phone/Fax    04/26/2024 09:18 AM CDT Phone (Incoming) Socrates Paez (Self) [phone] (H)            What medication are you calling about (include dose and sig)?:     Pt has tried the losartan but is it not working for him-he states he is not being able to sleep and would like his old mediation back.  Please send his previous script to the pharmacy.  He would like this one back ASAP-today if possible.     Disp Refills Start End HIEU   losartan-hydrochlorothiazide (HYZAAR) 100-25 MG tablet (Discontinued) 90 tablet 3 5/26/2023 4/19/2024 No   Sig - Route: Take 1 tablet by mouth daily - Oral   Sent to pharmacy as: Losartan Potassium-HCTZ 100-25 MG Oral Tablet (HYZAAR)   Class: E-Prescribe   Reason for Discontinue: Therapy completed (No AVS)   Order: [id number]   E-Prescribing Status: Receipt confirmed by pharmacy (5/26/2023  3:09 PM CDT)   E-Cancel Status: Request approved by pharmacy (4/19/2024  1:08 PM CDT)       E-Cancel Status Note: Some or all of Rx dispensed; Last fill:02/07/24       Preferred Pharmacy:   [id number] - [place], [place] - [address] AT [facility] & [address] [place] [address]  Phone: [phone] Fax: [phone]    [id number] - [place], [place] - [address] [place] [address]  Phone: [phone] Fax: [phone]      Controlled Substance Agreement on file:   CSA -- Patient Level:     [Media Unavailable] Controlled Substance Agreement - Non - Opioid - Scan on 9/24/2021  1:18 PM: NON-OPIOID CONTROLLED SUBSTANCE AGREEMENT   [Media Unavailable] Controlled Substance Agreement - Non - Opioid - Scan on 2/5/2020: NON-OPIOID CONTROLLED SUBSTANCE AGREEMENT   [Media Unavailable] Controlled Substance Agreement - Non - Opioid - Scan on 10/24/2019   [Media Unavailable] Controlled Substance Agreement - Opioid - Scan on 10/22/2018   [Media  Unavailable] Controlled Substance Agreement - Opioid - Scan on 4/19/2018   [Media Unavailable] Controlled Substance Agreement - Opioid - Scan on 10/2/2017   [Media Unavailable] Controlled Substance Agreement - Opioid - Scan on 7/27/2016: CONTROLLED SUBSTANCE 7/25/2016       Who prescribed the medication?: Dr Tejada    Do you need a refill? Yes    When did you use the medication last? Last week    Patient offered an appointment? No    Do you have any questions or concerns?  No      Could we send this information to you in [facility] or would you prefer to receive a phone call?:   Patient would prefer a phone call   Okay to leave a detailed message?: Yes at Home number on file [phone] (home)

## 2024-04-26 NOTE — TELEPHONE ENCOUNTER
"Looks like Dr Tejada stopped hydrochlorothiazide due to presumed \" crystalline arthropathy\": hydrochlorothiazide can increase urine acid levels and cause gout.    O'K to go back on Losartan-hydrochlorothiazide if pt prefers and hip OA is not feeling better. Order sent.   "

## 2024-04-26 NOTE — TELEPHONE ENCOUNTER
"Spoke with patient to gather more information. He states he did not have trouble sleeping, but was woken up at 3 am by his noisy neighbors. States he did have some issues with his breathing stating it was \"not normal\" but was not able to clarify. States it has resolved and he wonders if it was because he was no longer taking the losartan-hydrochlorothiazide and was laying on his back. States he did also have some tightness in his chest. States he has no symptoms now and declines further triage. He would like to request the losartan-hydrochlorothiazide be sent in again today.  "

## 2024-05-13 ENCOUNTER — TELEPHONE (OUTPATIENT)
Dept: INTERNAL MEDICINE | Facility: CLINIC | Age: 69
End: 2024-05-13
Payer: COMMERCIAL

## 2024-05-13 NOTE — TELEPHONE ENCOUNTER
Medication Question or Refill    What medication are you calling about (include dose and sig)?: Quetiapine  - for sleep- Trazodone is not working.    Preferred Pharmacy:  [id number] - [place], [place] - [address] [facility] AT [facility] & [address]  [address] [place] [address]  Phone: [phone] Fax: [phone]        Controlled Substance Agreement on file:   CSA -- Patient Level:     [Media Unavailable] Controlled Substance Agreement - Non - Opioid - Scan on 9/24/2021  1:18 PM: NON-OPIOID CONTROLLED SUBSTANCE AGREEMENT   [Media Unavailable] Controlled Substance Agreement - Non - Opioid - Scan on 2/5/2020: NON-OPIOID CONTROLLED SUBSTANCE AGREEMENT   [Media Unavailable] Controlled Substance Agreement - Non - Opioid - Scan on 10/24/2019   [Media Unavailable] Controlled Substance Agreement - Opioid - Scan on 10/22/2018   [Media Unavailable] Controlled Substance Agreement - Opioid - Scan on 4/19/2018   [Media Unavailable] Controlled Substance Agreement - Opioid - Scan on 10/2/2017   [Media Unavailable] Controlled Substance Agreement - Opioid - Scan on 7/27/2016: CONTROLLED SUBSTANCE 7/25/2016       Who prescribed the medication?: Dr. Day    Do you need a refill? Yes    When did you use the medication last?  A month ago    Patient offered an appointment? Yes    Do you have any questions or concerns?  Yes is only getting 2-3 hours of sleep at night.      Could we send this information to you in [place] or would you prefer to receive a phone call?:   Patient would prefer a phone call   Okay to leave a detailed message?: Yes at Cell number on file:    Telephone Information:   Mobile [phone]

## 2024-05-14 ENCOUNTER — VIRTUAL VISIT (OUTPATIENT)
Dept: FAMILY MEDICINE | Facility: CLINIC | Age: 69
End: 2024-05-14
Payer: COMMERCIAL

## 2024-05-14 DIAGNOSIS — F51.04 PSYCHOPHYSIOLOGICAL INSOMNIA: Primary | ICD-10-CM

## 2024-05-14 DIAGNOSIS — F51.04 PSYCHOPHYSIOLOGICAL INSOMNIA: ICD-10-CM

## 2024-05-14 PROCEDURE — 99213 OFFICE O/P EST LOW 20 MIN: CPT | Mod: 95 | Performed by: FAMILY MEDICINE

## 2024-05-14 PROCEDURE — G2211 COMPLEX E/M VISIT ADD ON: HCPCS | Mod: 95 | Performed by: FAMILY MEDICINE

## 2024-05-14 RX ORDER — QUETIAPINE FUMARATE 50 MG/1
100 TABLET, FILM COATED ORAL
Qty: 180 TABLET | Refills: 1 | Status: SHIPPED | OUTPATIENT
Start: 2024-05-14 | End: 2024-07-05

## 2024-05-14 RX ORDER — QUETIAPINE FUMARATE 50 MG/1
100 TABLET, FILM COATED ORAL
Qty: 180 TABLET | Refills: 1 | Status: SHIPPED | OUTPATIENT
Start: 2024-05-14 | End: 2024-05-14

## 2024-05-14 NOTE — PROGRESS NOTES
"Socrates is a 68 year old who is being evaluated via a billable video visit.    How would you like to obtain your AVS? MyChart  If the video visit is dropped, the invitation should be resent by: Text to cell phone: [phone]  Will anyone else be joining your video visit? No      Assessment & Plan       ICD-10-CM    1. Psychophysiological insomnia  F51.04 QUEtiapine (SEROQUEL) 50 MG tablet     Sleep Psychology  Referral        The longitudinal plan of care for the diagnosis(es)/condition(s) as documented were addressed during this visit. Due to the added complexity in care, I will continue to support Socrates in the subsequent management and with ongoing continuity of care.     Review of external notes as documented elsewhere in note        BMI  Estimated body mass index is 26.58 kg/m  as calculated from the following:    Height as of 11/30/23: 1.753 m (5' 9\").    Weight as of 11/30/23: 81.6 kg (180 lb).   Weight management plan: Discussed healthy diet and exercise guidelines      There are no Patient Instructions on file for this visit.    Subjective   Socrates is a 68 year old, presenting for the following health issues:  Insomnia      5/14/2024     2:41 PM   Additional Questions   Roomed by Asia   Accompanied by none         5/14/2024     2:41 PM   Patient Reported Additional Medications   Patient reports taking the following new medications none     Video Start Time: 3:11pm    History of Present Illness       Reason for visit:  Insomnia    He eats 2-3 servings of fruits and vegetables daily.He consumes 2 sweetened beverage(s) daily.He exercises with enough effort to increase his heart rate 10 to 19 minutes per day.  He exercises with enough effort to increase his heart rate 3 or less days per week.   He is taking medications regularly.       Insomnia  Seems to get agitated when going to bed.  Unable to sleep even though he's tired.  Seroquel helped, stopped this because he didn't want to become dependent on a " sleep medication  Switched to trazodone, which helped for one night, then stopped working.  Wants to go back on seroquel            Review of Systems  Constitutional, HEENT, cardiovascular, pulmonary, gi and gu systems are negative, except as otherwise noted.      Objective           Vitals:  No vitals were obtained today due to virtual visit.    Physical Exam   GENERAL: alert and no distress  EYES: Eyes grossly normal to inspection.  No discharge or erythema, or obvious scleral/conjunctival abnormalities.  RESP: No audible wheeze, cough, or visible cyanosis.    SKIN: Visible skin clear. No significant rash, abnormal pigmentation or lesions.  NEURO: Cranial nerves grossly intact.  Mentation and speech appropriate for age.  PSYCH: Appropriate affect, tone, and pace of words          Video-Visit Details    Type of service:  Video Visit   Video End Time:3:24pm  Originating Location (pt. Location): Home    Distant Location (provider location):  On-site  Platform used for Video Visit: Chema  Signed Electronically by: Adam Mcdaniel MD

## 2024-05-14 NOTE — TELEPHONE ENCOUNTER
Pt called to check on status.  He would like to go back to his old medication as the trazadone is not working.     Disp Refills Start End HIEU   QUEtiapine (SEROQUEL) 50 MG tablet (Discontinued) 90 tablet 3 3/5/2024 4/19/2024 No   Sig: TAKE 1-2 TABLETS BY MOUTH EVERY NIGHT AT BEDTIME   Sent to pharmacy as: QUEtiapine Fumarate 50 MG Oral Tablet (SEROquel)   Class: E-Prescribe   Reason for Discontinue: Reorder (No AVS)   Order: [id number]   E-Prescribing Status: Receipt confirmed by pharmacy (3/5/2024  2:01 PM CST)   E-Cancel Status: Request approved by pharmacy (4/19/2024  1:10 PM CDT)

## 2024-05-15 ENCOUNTER — TELEPHONE (OUTPATIENT)
Dept: INTERNAL MEDICINE | Facility: CLINIC | Age: 69
End: 2024-05-15
Payer: COMMERCIAL

## 2024-05-15 DIAGNOSIS — F98.8 ATTENTION DEFICIT DISORDER (ADD) WITHOUT HYPERACTIVITY: ICD-10-CM

## 2024-05-15 RX ORDER — DEXTROAMPHETAMINE SACCHARATE, AMPHETAMINE ASPARTATE MONOHYDRATE, DEXTROAMPHETAMINE SULFATE AND AMPHETAMINE SULFATE 5; 5; 5; 5 MG/1; MG/1; MG/1; MG/1
40 CAPSULE, EXTENDED RELEASE ORAL EVERY MORNING
Qty: 60 CAPSULE | Refills: 0 | Status: CANCELLED | OUTPATIENT
Start: 2024-05-15

## 2024-05-15 NOTE — TELEPHONE ENCOUNTER
Medication Question or Refill    Contacts         Type Contact Phone/Fax    05/15/2024 09:02 AM CDT Phone (Incoming) Jeannine Socrates ORTIZ (Self) [phone] (H)            What medication are you calling about (include dose and sig)?: Adderall ER 20mg    Preferred Pharmacy:    [id number] - [place], [place] - [address] [facility] [address]  [address] [place] [address]  Phone: [phone] Fax: [phone]        Controlled Substance Agreement on file:   CSA -- Patient Level:     [Media Unavailable] Controlled Substance Agreement - Non - Opioid - Scan on 9/24/2021  1:18 PM: NON-OPIOID CONTROLLED SUBSTANCE AGREEMENT   [Media Unavailable] Controlled Substance Agreement - Non - Opioid - Scan on 2/5/2020: NON-OPIOID CONTROLLED SUBSTANCE AGREEMENT   [Media Unavailable] Controlled Substance Agreement - Non - Opioid - Scan on 10/24/2019   [Media Unavailable] Controlled Substance Agreement - Opioid - Scan on 10/22/2018   [Media Unavailable] Controlled Substance Agreement - Opioid - Scan on 4/19/2018   [Media Unavailable] Controlled Substance Agreement - Opioid - Scan on 10/2/2017   [Media Unavailable] Controlled Substance Agreement - Opioid - Scan on 7/27/2016: CONTROLLED SUBSTANCE 7/25/2016       Who prescribed the medication?: PCP    Do you need a refill? Yes    When did you use the medication last? 05/14/24    Patient offered an appointment? No    Do you have any questions or concerns?  Yes  Medicine is causing insomnia    Could we send this information to you in [facility] or would you prefer to receive a phone call?:   Patient would prefer a phone call   Okay to leave a detailed message?: Yes at Cell number on file:    Telephone Information:   Mobile [phone]

## 2024-05-16 DIAGNOSIS — F90.9 ATTENTION DEFICIT HYPERACTIVITY DISORDER (ADHD), UNSPECIFIED ADHD TYPE: Primary | ICD-10-CM

## 2024-05-16 RX ORDER — DEXTROAMPHETAMINE SACCHARATE, AMPHETAMINE ASPARTATE, DEXTROAMPHETAMINE SULFATE AND AMPHETAMINE SULFATE 5; 5; 5; 5 MG/1; MG/1; MG/1; MG/1
20 TABLET ORAL DAILY
Qty: 30 TABLET | Refills: 0 | Status: SHIPPED | OUTPATIENT
Start: 2024-07-15 | End: 2024-06-04

## 2024-05-16 RX ORDER — DEXTROAMPHETAMINE SACCHARATE, AMPHETAMINE ASPARTATE, DEXTROAMPHETAMINE SULFATE AND AMPHETAMINE SULFATE 5; 5; 5; 5 MG/1; MG/1; MG/1; MG/1
20 TABLET ORAL DAILY
Qty: 30 TABLET | Refills: 0 | Status: SHIPPED | OUTPATIENT
Start: 2024-06-15 | End: 2024-06-04

## 2024-05-16 RX ORDER — DEXTROAMPHETAMINE SACCHARATE, AMPHETAMINE ASPARTATE, DEXTROAMPHETAMINE SULFATE AND AMPHETAMINE SULFATE 5; 5; 5; 5 MG/1; MG/1; MG/1; MG/1
20 TABLET ORAL DAILY
Qty: 30 TABLET | Refills: 0 | Status: SHIPPED | OUTPATIENT
Start: 2024-05-16 | End: 2024-06-04

## 2024-05-17 ENCOUNTER — TELEPHONE (OUTPATIENT)
Dept: ORTHOPEDICS | Facility: CLINIC | Age: 69
End: 2024-05-17

## 2024-05-17 RX ORDER — DEXTROAMPHETAMINE SACCHARATE, AMPHETAMINE ASPARTATE MONOHYDRATE, DEXTROAMPHETAMINE SULFATE AND AMPHETAMINE SULFATE 5; 5; 5; 5 MG/1; MG/1; MG/1; MG/1
40 CAPSULE, EXTENDED RELEASE ORAL EVERY MORNING
Qty: 60 CAPSULE | Refills: 0 | Status: CANCELLED | OUTPATIENT
Start: 2024-05-17

## 2024-05-17 NOTE — TELEPHONE ENCOUNTER
As covering provider for Dr Day please.    Pt is calling and he has been on this adderall xr 20 mg for a long time and would like this again please.     Pended for review.  Pt is out of medication.  Please call patient when this is sent please.       Disp Refills Start End HIEU   amphetamine-dextroamphetamine (ADDERALL XR) 20 MG 24 hr capsule 60 capsule 0 4/16/2024 -- No   Sig - Route: Take 2 capsules (40 mg) by mouth every morning - Oral   Sent to pharmacy as: Amphetamine-Dextroamphet ER 20 MG Oral Capsule Extended Release 24 Hour (ADDERALL XR)   Class: E-Prescribe   Earliest Fill Date: 4/16/2024   Order: [id number]   E-Prescribing Status: Receipt confirmed by pharmacy (4/16/2024 12:30 PM CDT)

## 2024-05-17 NOTE — TELEPHONE ENCOUNTER
Received voicemail message from patient requesting to reschedule his surgery with Dr Driscoll to a later date as he is still working on getting some dental work completed.

## 2024-05-17 NOTE — TELEPHONE ENCOUNTER
Clyde Day MD   to Behr, Pam J. JR    5/16/24  7:03 AM  He could change his adderall to the non-long acting version of adderall 20 mg daily. Possibly that won't affect his sleeping.  Dr. Barb MD

## 2024-05-17 NOTE — TELEPHONE ENCOUNTER
Looks like  Routinely ordered medication yesterday for patient.  It should be available in the pharmacy.

## 2024-05-24 ENCOUNTER — PRE VISIT (OUTPATIENT)
Dept: SURGERY | Facility: CLINIC | Age: 69
End: 2024-05-24

## 2024-05-24 NOTE — TELEPHONE ENCOUNTER
Phoned patient to reschedule surgery with Dr. Driscoll. Message left with direct number to call back at their convenience.    Jennifer  Perioperative   [phone]

## 2024-06-04 ENCOUNTER — TELEPHONE (OUTPATIENT)
Dept: INTERNAL MEDICINE | Facility: CLINIC | Age: 69
End: 2024-06-04
Payer: COMMERCIAL

## 2024-06-04 DIAGNOSIS — M10.9 URIC ACID ARTHROPATHY: ICD-10-CM

## 2024-06-04 DIAGNOSIS — M16.12 PRIMARY OSTEOARTHRITIS OF LEFT HIP: ICD-10-CM

## 2024-06-04 RX ORDER — PREDNISONE 10 MG/1
10 TABLET ORAL EVERY MORNING
Qty: 5 TABLET | Refills: 0 | Status: SHIPPED | OUTPATIENT
Start: 2024-06-04 | End: 2024-08-08

## 2024-06-04 NOTE — TELEPHONE ENCOUNTER
Medication Question or Refill    Contacts         Type Contact Phone/Fax    06/04/2024 01:14 PM CDT Phone (Incoming) Socrates Paez (Self) [phone] (H)            What medication are you calling about (include dose and sig)?:     Pt is requesting refill on medication due to pain in left hip.  He states he does a therapy every so often when pain in really bad.     Disp Refills Start End HIEU   predniSONE (DELTASONE) 20 MG tablet 4 tablet 0 2/7/2024 2/11/2024 --   Sig - Route: Take 1 tablet (20 mg) by mouth every morning for 4 days - Oral   Sent to pharmacy as: predniSONE 20 MG Oral Tablet (DELTASONE)   Class: E-Prescribe   Order: [id number]   E-Prescribing Status: Receipt confirmed by pharmacy (2/7/2024 11:19 AM CST)       Preferred Pharmacy:  [id number] - [place], [place] - [address] [place] [address] & [facility] [address] [place] [address]  Phone: [phone] Fax: [phone]        Controlled Substance Agreement on file:   CSA -- Patient Level:     [Media Unavailable] Controlled Substance Agreement - Non - Opioid - Scan on 9/24/2021  1:18 PM: NON-OPIOID CONTROLLED SUBSTANCE AGREEMENT   [Media Unavailable] Controlled Substance Agreement - Non - Opioid - Scan on 2/5/2020: NON-OPIOID CONTROLLED SUBSTANCE AGREEMENT   [Media Unavailable] Controlled Substance Agreement - Non - Opioid - Scan on 10/24/2019   [Media Unavailable] Controlled Substance Agreement - Opioid - Scan on 10/22/2018   [Media Unavailable] Controlled Substance Agreement - Opioid - Scan on 4/19/2018   [Media Unavailable] Controlled Substance Agreement - Opioid - Scan on 10/2/2017   [Media Unavailable] Controlled Substance Agreement - Opioid - Scan on 7/27/2016: CONTROLLED SUBSTANCE 7/25/2016       Who prescribed the medication?: Dr Tejada    Do you need a refill? Yes    When did you use the medication last? Feb 2024    Patient offered an appointment? No    Do you have any questions or concerns?   No      Could we send this information to you in [email or web address] or would you prefer to receive a phone call?:   Patient would prefer a phone call   Okay to leave a detailed message?: Yes at Home number on file [phone] (home)

## 2024-06-04 NOTE — TELEPHONE ENCOUNTER
4/19/24 Visit note    1. 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 Trial of prednisone but this time slightly lower dose. Improve uric acid component by cessation of hydrochlorothiazide  - losartan (COZAAR) 100 MG tablet; Take 1 tablet (100 mg) by mouth daily Dispense: 90 tablet; Refill: 3  - predniSONE (DELTASONE) 10 MG tablet; Take 1 tablet (10 mg) by mouth every morning for 5 days Dispense: 5 tablet; Refill: 0

## 2024-06-05 ENCOUNTER — TELEPHONE (OUTPATIENT)
Dept: INTERNAL MEDICINE | Facility: CLINIC | Age: 69
End: 2024-06-05

## 2024-06-05 NOTE — TELEPHONE ENCOUNTER
Patient is on the Chronic Pain Quality Measure/Registry but does not have a chronic opioid diagnosis in the problem list.  Please review the chart and update the diagnosis list if appropriate.      The two ICD-10 codes which would be acceptable are F11.90 or F11.2.    Adding either the F11.90 or F11.2 diagnoses to the problem list will automatically enroll your patient in the annual Health Maintenance Plan for the four ACC components of the metric (MANAV-7, PHQ-9, Utox and CSA) at their next visit.    Once the problem list has been updated or not (if not appropriate), then please close the chart.        Pam J. Behr

## 2024-06-25 DIAGNOSIS — F98.8 ATTENTION DEFICIT DISORDER (ADD) WITHOUT HYPERACTIVITY: ICD-10-CM

## 2024-06-25 RX ORDER — DEXTROAMPHETAMINE SACCHARATE, AMPHETAMINE ASPARTATE MONOHYDRATE, DEXTROAMPHETAMINE SULFATE AND AMPHETAMINE SULFATE 5; 5; 5; 5 MG/1; MG/1; MG/1; MG/1
40 CAPSULE, EXTENDED RELEASE ORAL EVERY MORNING
Qty: 60 CAPSULE | Refills: 0 | Status: SHIPPED | OUTPATIENT
Start: 2024-06-25 | End: 2024-07-24

## 2024-07-05 DIAGNOSIS — F51.04 PSYCHOPHYSIOLOGICAL INSOMNIA: ICD-10-CM

## 2024-07-05 NOTE — TELEPHONE ENCOUNTER
FYI - Status Update    Who is Calling: patient    Update: just got back from brothers, Chio montanez, misplaced rest of sleeping meds. Already spoke with pharmacy. Pt can pay for extra. Pt asking for refill on sleeping medication.    Pt requesting a 30 day supply.    Pharmacy: [facility]     QUEtiapine (SEROQUEL) 50 MG tablet Take 2 tablets (100 mg) by mouth nightly as needed       Does caller want a call/response back: Yes     Could we send this information to you in [email or web address] or would you prefer to receive a phone call?:   Patient would prefer a phone call     Okay to leave a detailed message?: Yes at Cell number on file:    Telephone Information:   Mobile [phone]

## 2024-07-08 RX ORDER — QUETIAPINE FUMARATE 50 MG/1
100 TABLET, FILM COATED ORAL
Qty: 60 TABLET | Refills: 0 | Status: SHIPPED | OUTPATIENT
Start: 2024-07-08 | End: 2024-08-13

## 2024-07-24 DIAGNOSIS — F98.8 ATTENTION DEFICIT DISORDER (ADD) WITHOUT HYPERACTIVITY: ICD-10-CM

## 2024-07-24 RX ORDER — DEXTROAMPHETAMINE SACCHARATE, AMPHETAMINE ASPARTATE MONOHYDRATE, DEXTROAMPHETAMINE SULFATE AND AMPHETAMINE SULFATE 5; 5; 5; 5 MG/1; MG/1; MG/1; MG/1
40 CAPSULE, EXTENDED RELEASE ORAL EVERY MORNING
Qty: 60 CAPSULE | Refills: 0 | Status: SHIPPED | OUTPATIENT
Start: 2024-07-24 | End: 2024-08-20

## 2024-07-24 NOTE — TELEPHONE ENCOUNTER
Medication Question or Refill    What medication are you calling about (include dose and sig)?: Adderall XR 20  MG 24 hr capsule    Preferred Pharmacy:  [facility] #[id number] - [place], [place] - [address] [facility] & [address] [place] [address]  [address] [place] [address]  Phone: [phone] Fax: [phone]        Controlled Substance Agreement on file:   CSA -- Patient Level:     [Media Unavailable] Controlled Substance Agreement - Non - Opioid - Scan on 9/24/2021  1:18 PM: NON-OPIOID CONTROLLED SUBSTANCE AGREEMENT   [Media Unavailable] Controlled Substance Agreement - Non - Opioid - Scan on 2/5/2020: NON-OPIOID CONTROLLED SUBSTANCE AGREEMENT   [Media Unavailable] Controlled Substance Agreement - Non - Opioid - Scan on 10/24/2019   [Media Unavailable] Controlled Substance Agreement - Opioid - Scan on 10/22/2018   [Media Unavailable] Controlled Substance Agreement - Opioid - Scan on 4/19/2018   [Media Unavailable] Controlled Substance Agreement - Opioid - Scan on 10/2/2017   [Media Unavailable] Controlled Substance Agreement - Opioid - Scan on 7/27/2016: CONTROLLED SUBSTANCE 7/25/2016       Who prescribed the medication?:     Do you need a refill? Yes    Do you have any questions or concerns?  No      Could we send this information to you in [facility] or would you prefer to receive a phone call?:   Patient would prefer a phone call   Okay to leave a detailed message?: Yes at Cell number on file:    Telephone Information:   Mobile [phone]

## 2024-07-24 NOTE — TELEPHONE ENCOUNTER
Please review per Dr Coppola as this does count towards the [facility] pain quality pain Measure goals.

## 2024-07-28 PROBLEM — F11.90 CHRONIC, CONTINUOUS USE OF OPIOIDS: Status: ACTIVE | Noted: 2024-07-28

## 2024-07-30 DIAGNOSIS — I10 HYPERTENSION: ICD-10-CM

## 2024-07-30 RX ORDER — AMLODIPINE BESYLATE 5 MG/1
5 TABLET ORAL DAILY
Qty: 90 TABLET | Refills: 3 | Status: SHIPPED | OUTPATIENT
Start: 2024-07-30

## 2024-08-08 ENCOUNTER — MYC MEDICAL ADVICE (OUTPATIENT)
Dept: INTERNAL MEDICINE | Facility: CLINIC | Age: 69
End: 2024-08-08
Payer: COMMERCIAL

## 2024-08-08 DIAGNOSIS — M16.12 PRIMARY OSTEOARTHRITIS OF LEFT HIP: ICD-10-CM

## 2024-08-08 DIAGNOSIS — M10.9 URIC ACID ARTHROPATHY: ICD-10-CM

## 2024-08-09 RX ORDER — PREDNISONE 10 MG/1
10 TABLET ORAL EVERY MORNING
Qty: 5 TABLET | Refills: 0 | Status: SHIPPED | OUTPATIENT
Start: 2024-08-09

## 2024-08-09 NOTE — TELEPHONE ENCOUNTER
Patient prescribed 5 tablets of prednisone 10 mg in the past for osteoarthritis flare of left hip. Last prescribed 6/4/24.     Patient reporting osteoarthritis flare of left hip. Reports he is working to get hip surgery, but first needs dental clearance. Patient requesting five day script of prednisone 10 mg as he has been prescribed in the past    Informed patient that his request would be sent to Dr. Day's covering provider for review.  He verbalized understanding.

## 2024-08-12 ENCOUNTER — TELEPHONE (OUTPATIENT)
Dept: INTERNAL MEDICINE | Facility: CLINIC | Age: 69
End: 2024-08-12
Payer: COMMERCIAL

## 2024-08-12 DIAGNOSIS — F51.04 PSYCHOPHYSIOLOGICAL INSOMNIA: ICD-10-CM

## 2024-08-12 NOTE — TELEPHONE ENCOUNTER
General Call    Contacts       Contact Date/Time Type Contact Phone/Fax    08/12/2024 11:37 AM CDT Phone (Incoming) Socrates Paez (Self) [phone] (H)          Reason for Call:    QUEtiapine (SEROQUEL) 50 MG tablet 60 tablet 0 7/8/2024 -- No   Sig - Route: Take 2 tablets (100 mg) by mouth nightly as needed - Oral       What are your questions or concerns:  Pt wants to up the dosage on this medication as he is taking more then 2 a night as that is not working - pt states he is taking about 3-4 a day and would like to up the dosage - please advise and let pt know recommendations     Date of last appointment with provider: 04/03/24    Could we send this information to you in [facility] or would you prefer to receive a phone call?:   Patient would prefer a phone call   Okay to leave a detailed message?: No at Cell number on file:    Telephone Information:   Mobile [phone]

## 2024-08-14 RX ORDER — QUETIAPINE FUMARATE 50 MG/1
200 TABLET, FILM COATED ORAL
Qty: 360 TABLET | Refills: 3 | Status: SHIPPED | OUTPATIENT
Start: 2024-08-14

## 2024-08-14 NOTE — TELEPHONE ENCOUNTER
"Left message for patient requesting return call.     If patient calls back, please confirm how patient has been taking. Original note states \"Pt wants to up the dosage on this medication as he is taking more then 2 a night as that is not working - pt states he is taking about 3-4 a day\". Is patient taking 3-4 Seroquel tablets during the day AND 2 tablets at night? Or 3-4 Seroquel tablets at night to sleep with no tablets being taken during the day?     Once confirmed, please pend prescription to Dr. aDy for review.   "

## 2024-08-14 NOTE — TELEPHONE ENCOUNTER
Spoke with patient. Patient states they take up to 4 tablets per night for sleep. Patient does not take any during the day.

## 2024-08-14 NOTE — TELEPHONE ENCOUNTER
Pt checking status on this and states he has not slept in 3 days - please advise and reach out to pt as he is out of medication

## 2024-08-20 DIAGNOSIS — F98.8 ATTENTION DEFICIT DISORDER (ADD) WITHOUT HYPERACTIVITY: ICD-10-CM

## 2024-08-20 RX ORDER — DEXTROAMPHETAMINE SACCHARATE, AMPHETAMINE ASPARTATE MONOHYDRATE, DEXTROAMPHETAMINE SULFATE AND AMPHETAMINE SULFATE 5; 5; 5; 5 MG/1; MG/1; MG/1; MG/1
40 CAPSULE, EXTENDED RELEASE ORAL EVERY MORNING
Qty: 60 CAPSULE | Refills: 0 | Status: SHIPPED | OUTPATIENT
Start: 2024-08-20 | End: 2024-09-17

## 2024-09-17 DIAGNOSIS — F98.8 ATTENTION DEFICIT DISORDER (ADD) WITHOUT HYPERACTIVITY: ICD-10-CM

## 2024-09-17 RX ORDER — DEXTROAMPHETAMINE SACCHARATE, AMPHETAMINE ASPARTATE MONOHYDRATE, DEXTROAMPHETAMINE SULFATE AND AMPHETAMINE SULFATE 5; 5; 5; 5 MG/1; MG/1; MG/1; MG/1
40 CAPSULE, EXTENDED RELEASE ORAL EVERY MORNING
Qty: 60 CAPSULE | Refills: 0 | Status: SHIPPED | OUTPATIENT
Start: 2024-09-17

## 2024-09-18 ENCOUNTER — TELEPHONE (OUTPATIENT)
Dept: INTERNAL MEDICINE | Facility: CLINIC | Age: 69
End: 2024-09-18
Payer: COMMERCIAL

## 2024-09-18 DIAGNOSIS — F98.8 ATTENTION DEFICIT DISORDER (ADD) WITHOUT HYPERACTIVITY: ICD-10-CM

## 2024-09-18 RX ORDER — DEXTROAMPHETAMINE SACCHARATE, AMPHETAMINE ASPARTATE, DEXTROAMPHETAMINE SULFATE AND AMPHETAMINE SULFATE 5; 5; 5; 5 MG/1; MG/1; MG/1; MG/1
20 TABLET ORAL DAILY
Qty: 30 TABLET | Refills: 0 | Status: SHIPPED | OUTPATIENT
Start: 2024-09-18 | End: 2024-09-18

## 2024-09-18 RX ORDER — DEXTROAMPHETAMINE SACCHARATE, AMPHETAMINE ASPARTATE, DEXTROAMPHETAMINE SULFATE AND AMPHETAMINE SULFATE 5; 5; 5; 5 MG/1; MG/1; MG/1; MG/1
20 TABLET ORAL 2 TIMES DAILY
Qty: 60 TABLET | Refills: 0 | Status: SHIPPED | OUTPATIENT
Start: 2024-09-18

## 2024-09-18 RX ORDER — DEXTROAMPHETAMINE SACCHARATE, AMPHETAMINE ASPARTATE, DEXTROAMPHETAMINE SULFATE AND AMPHETAMINE SULFATE 5; 5; 5; 5 MG/1; MG/1; MG/1; MG/1
20 TABLET ORAL DAILY
Qty: 30 TABLET | Refills: 0 | Status: SHIPPED | OUTPATIENT
Start: 2024-10-18 | End: 2024-09-18

## 2024-09-18 RX ORDER — DEXTROAMPHETAMINE SACCHARATE, AMPHETAMINE ASPARTATE, DEXTROAMPHETAMINE SULFATE AND AMPHETAMINE SULFATE 5; 5; 5; 5 MG/1; MG/1; MG/1; MG/1
20 TABLET ORAL DAILY
Qty: 30 TABLET | Refills: 0 | Status: SHIPPED | OUTPATIENT
Start: 2024-11-17 | End: 2024-09-18

## 2024-09-18 RX ORDER — DEXTROAMPHETAMINE SACCHARATE, AMPHETAMINE ASPARTATE MONOHYDRATE, DEXTROAMPHETAMINE SULFATE AND AMPHETAMINE SULFATE 5; 5; 5; 5 MG/1; MG/1; MG/1; MG/1
40 CAPSULE, EXTENDED RELEASE ORAL EVERY MORNING
Qty: 60 CAPSULE | Refills: 0 | Status: CANCELLED | OUTPATIENT
Start: 2024-09-18

## 2024-09-18 NOTE — TELEPHONE ENCOUNTER
"  Medication Question or Refill        What medication are you calling about (include dose and sig)?:    Disp Refills Start End HIEU   amphetamine-dextroamphetamine (ADDERALL XR) 20 MG 24 hr capsule 60 capsule 0 9/17/2024 -- No   Sig - Route: Take 2 capsules (40 mg) by mouth every morning. - Oral     Who prescribed the medication?: Dr Day    Do you need a refill? Yes    When did you use the medication last? 9/18    Do you have any questions or concerns?  Patient is asking for a new prescription to be sent to the pharmacy below specifying \"tablets\" vs \"capsules\".    Could we send this information to you in [facility] or would you prefer to receive a phone call?:   Patient would prefer a phone call     Okay to leave a detailed message?: Yes at Cell number on file:    Telephone Information:   Mobile [phone]       Preferred Pharmacy:  [facility] #[id number] - [place], [place] - [address] [place] [address]  Phone: [phone] Fax: [phone]        Controlled Substance Agreement on file:   CSA -- Patient Level:     [Media Unavailable] Controlled Substance Agreement - Non - Opioid - Scan on 9/24/2021  1:18 PM: NON-OPIOID CONTROLLED SUBSTANCE AGREEMENT   [Media Unavailable] Controlled Substance Agreement - Non - Opioid - Scan on 2/5/2020: NON-OPIOID CONTROLLED SUBSTANCE AGREEMENT   [Media Unavailable] Controlled Substance Agreement - Non - Opioid - Scan on 10/24/2019   [Media Unavailable] Controlled Substance Agreement - Opioid - Scan on 10/22/2018   [Media Unavailable] Controlled Substance Agreement - Opioid - Scan on 4/19/2018   [Media Unavailable] Controlled Substance Agreement - Opioid - Scan on 10/2/2017   [Media Unavailable] Controlled Substance Agreement - Opioid - Scan on 7/27/2016: CONTROLLED SUBSTANCE 7/25/2016           "

## 2024-09-18 NOTE — TELEPHONE ENCOUNTER
amphetamine-dextroamphetamine (ADDERALL) 20 MG tablet 30 tablet 0 9/18/2024 10/18/2024 --   Sig - Route: Take 1 tablet (20 mg) by mouth daily. - Oral   Sent to pharmacy as: Amphetamine-Dextroamphetamine 20 MG Oral Tablet (Adderall)     Pt called and stated that he only got 30 tablets instead of 60 and he takes 2 daily - please send a new script

## 2024-10-08 DIAGNOSIS — I10 ESSENTIAL HYPERTENSION, BENIGN: ICD-10-CM

## 2024-10-08 DIAGNOSIS — R00.0 TACHYCARDIA: ICD-10-CM

## 2024-10-08 RX ORDER — METOPROLOL SUCCINATE 50 MG/1
50 TABLET, EXTENDED RELEASE ORAL DAILY
Qty: 90 TABLET | Refills: 0 | Status: SHIPPED | OUTPATIENT
Start: 2024-10-08

## 2024-10-15 DIAGNOSIS — G47.9 SLEEP DISORDER: Primary | ICD-10-CM

## 2024-10-15 RX ORDER — QUETIAPINE FUMARATE 100 MG/1
400 TABLET, FILM COATED ORAL AT BEDTIME
Qty: 120 TABLET | Refills: 3 | Status: SHIPPED | OUTPATIENT
Start: 2024-10-15

## 2024-10-18 DIAGNOSIS — F98.8 ATTENTION DEFICIT DISORDER (ADD) WITHOUT HYPERACTIVITY: ICD-10-CM

## 2024-10-18 RX ORDER — DEXTROAMPHETAMINE SACCHARATE, AMPHETAMINE ASPARTATE MONOHYDRATE, DEXTROAMPHETAMINE SULFATE AND AMPHETAMINE SULFATE 5; 5; 5; 5 MG/1; MG/1; MG/1; MG/1
40 CAPSULE, EXTENDED RELEASE ORAL EVERY MORNING
Qty: 60 CAPSULE | Refills: 0 | Status: SHIPPED | OUTPATIENT
Start: 2024-10-21 | End: 2024-10-23

## 2024-10-21 ENCOUNTER — TELEPHONE (OUTPATIENT)
Dept: INTERNAL MEDICINE | Facility: CLINIC | Age: 69
End: 2024-10-21
Payer: COMMERCIAL

## 2024-10-21 DIAGNOSIS — F98.8 ATTENTION DEFICIT DISORDER (ADD) WITHOUT HYPERACTIVITY: ICD-10-CM

## 2024-10-21 NOTE — TELEPHONE ENCOUNTER
Fine for refill of Adderall if he is due and CSA is up-to-date.  Please place for authorization.  Thank you.

## 2024-10-21 NOTE — TELEPHONE ENCOUNTER
Medication Question or Refill        What medication are you calling about (include dose and sig)?: Adderall 20 mg    Do you have any questions or concerns?  Pharmacy is out of extended release. Pt is asking for a new prescription to be sent to the pharmacy below for the regular release generic for Adderall 20 mg.    Could we send this information to you in [facility] or would you prefer to receive a phone call?:   Patient would prefer a phone call     Okay to leave a detailed message?: Yes at Home number on file [phone] (home)    Preferred Pharmacy:  [facility] #[id number] - [place], [place] - [address] [facility] [facility] [address] [facility] [address]   [address] [place] [address]  Phone: [phone] Fax: [phone]        Controlled Substance Agreement on file:   CSA -- Patient Level:     [Media Unavailable] Controlled Substance Agreement - Non - Opioid - Scan on 9/24/2021  1:18 PM: NON-OPIOID CONTROLLED SUBSTANCE AGREEMENT   [Media Unavailable] Controlled Substance Agreement - Non - Opioid - Scan on 2/5/2020: NON-OPIOID CONTROLLED SUBSTANCE AGREEMENT   [Media Unavailable] Controlled Substance Agreement - Non - Opioid - Scan on 10/24/2019   [Media Unavailable] Controlled Substance Agreement - Opioid - Scan on 10/22/2018   [Media Unavailable] Controlled Substance Agreement - Opioid - Scan on 4/19/2018   [Media Unavailable] Controlled Substance Agreement - Opioid - Scan on 10/2/2017   [Media Unavailable] Controlled Substance Agreement - Opioid - Scan on 7/27/2016: CONTROLLED SUBSTANCE 7/25/2016

## 2024-10-23 ENCOUNTER — TELEPHONE (OUTPATIENT)
Dept: INTERNAL MEDICINE | Facility: CLINIC | Age: 69
End: 2024-10-23
Payer: COMMERCIAL

## 2024-10-23 DIAGNOSIS — F98.8 ATTENTION DEFICIT DISORDER (ADD) WITHOUT HYPERACTIVITY: ICD-10-CM

## 2024-10-23 RX ORDER — DEXTROAMPHETAMINE SACCHARATE, AMPHETAMINE ASPARTATE MONOHYDRATE, DEXTROAMPHETAMINE SULFATE AND AMPHETAMINE SULFATE 5; 5; 5; 5 MG/1; MG/1; MG/1; MG/1
40 CAPSULE, EXTENDED RELEASE ORAL EVERY MORNING
Qty: 60 CAPSULE | Refills: 0 | Status: SHIPPED | OUTPATIENT
Start: 2024-10-23

## 2024-10-23 RX ORDER — DEXTROAMPHETAMINE SACCHARATE, AMPHETAMINE ASPARTATE, DEXTROAMPHETAMINE SULFATE AND AMPHETAMINE SULFATE 5; 5; 5; 5 MG/1; MG/1; MG/1; MG/1
20 TABLET ORAL 2 TIMES DAILY
Qty: 60 TABLET | Refills: 0 | Status: SHIPPED | OUTPATIENT
Start: 2024-10-23

## 2024-10-23 NOTE — TELEPHONE ENCOUNTER
amphetamine-dextroamphetamine (ADDERALL XR) 20 MG 24 hr capsule 60 capsule 0 10/21/2024 -- No   Sig - Route: Take 2 capsules (40 mg) by mouth every morning. - Oral   Sent to pharmacy as: Amphetamine-Dextroamphet ER 20 MG Oral Capsule Extended Release 24 Hour (ADDERALL XR)     Pt needs this changed to tablets not capsules as pharmacy is out of capsules

## 2024-10-25 ENCOUNTER — TELEPHONE (OUTPATIENT)
Dept: INTERNAL MEDICINE | Facility: CLINIC | Age: 69
End: 2024-10-25

## 2024-10-25 DIAGNOSIS — M16.12 PRIMARY OSTEOARTHRITIS OF LEFT HIP: ICD-10-CM

## 2024-10-25 DIAGNOSIS — M10.9 URIC ACID ARTHROPATHY: ICD-10-CM

## 2024-10-25 NOTE — TELEPHONE ENCOUNTER
S-(situation): Prednisone Request    B-(background): History of osteoarthritis of left hip. Last flare up was 8/9/24, prednisone 10 mg  for five days prescribed at this time. Patient working towards dental clearance, so he can move forward with hip surgery.     A-(assessment): Patient calls to report that his osteoarthritis has been bothering him since August. States it is very painful to get dressed and put on his socks and shoes. States the last prednisone prescription was not very helpful. The first time patient had prednisone, he states it was very helpful for the pain.     R-(recommendations): Patient wondering if a higher dose of prednisone, such as 15 mg, would be appropriate. He would like to something for the pain. Informed patient that his request would be sent to Dr. Day's covering provider of review.

## 2024-10-25 NOTE — TELEPHONE ENCOUNTER
New Medication Request    Contacts       Contact Date/Time Type Contact Phone/Fax    10/25/2024 08:58 AM CDT Phone (Incoming) Socrates Paez ANGEL (Self) [phone] (H)            What medication are you requesting?:   predniSONE (DELTASONE) 10 MG tablet 5 tablet 0 8/9/2024 -- No   Sig - Route: Take 1 tablet (10 mg) by mouth every morning - Oral   Sent to pharmacy as: predniSONE 10 MG Oral Tablet (DELTASONE)         Reason for medication request:  Pt wanting refill on this but wants 15mg instead as his pain is frustrating     Have you taken this medication before?: Yes: 10 mg but pt wants 15mg    Controlled Substance Agreement on file:   CSA -- Patient Level:     [Media Unavailable] Controlled Substance Agreement - Non - Opioid - Scan on 9/24/2021  1:18 PM: NON-OPIOID CONTROLLED SUBSTANCE AGREEMENT   [Media Unavailable] Controlled Substance Agreement - Non - Opioid - Scan on 2/5/2020: NON-OPIOID CONTROLLED SUBSTANCE AGREEMENT   [Media Unavailable] Controlled Substance Agreement - Non - Opioid - Scan on 10/24/2019   [Media Unavailable] Controlled Substance Agreement - Opioid - Scan on 10/22/2018   [Media Unavailable] Controlled Substance Agreement - Opioid - Scan on 4/19/2018   [Media Unavailable] Controlled Substance Agreement - Opioid - Scan on 10/2/2017   [Media Unavailable] Controlled Substance Agreement - Opioid - Scan on 7/27/2016: CONTROLLED SUBSTANCE 7/25/2016         Patient offered an appointment? No    Preferred Pharmacy:   [facility] #[id number] - [place], [place] - [address] [place] [address] AT [facility] & [address] [place] [address]  Phone: [phone] Fax: [phone]      Could we send this information to you in [facility] or would you prefer to receive a phone call?:   Patient would prefer a phone call   Okay to leave a detailed message?: No at Cell number on file:    Telephone Information:   Mobile [phone]

## 2024-10-25 NOTE — TELEPHONE ENCOUNTER
Patient notified that covering provider, Dr. Bae, is unable to prescribe prednisone for him. Discussed with patient that he could schedule an appointment with an alternate provider to discuss his pain and management or wait for Dr. Day's return. Patient prefers to wait for Dr. Day's return 11/12/24. He will call back if he changes his mind.

## 2024-10-26 DIAGNOSIS — M16.12 PRIMARY OSTEOARTHRITIS OF LEFT HIP: ICD-10-CM

## 2024-10-28 RX ORDER — PREDNISONE 20 MG/1
TABLET ORAL
Qty: 4 TABLET | Refills: 0 | Status: SHIPPED | OUTPATIENT
Start: 2024-10-28

## 2024-11-12 NOTE — TELEPHONE ENCOUNTER
Relayed provider advisement.    Patient reports Tramadol was not effective and caused his feet to swell up. Patient reports he has a couple more visits with the dentist and then he can proceed with hip replacement surgery.

## 2024-11-25 DIAGNOSIS — F98.8 ATTENTION DEFICIT DISORDER (ADD) WITHOUT HYPERACTIVITY: ICD-10-CM

## 2024-11-25 RX ORDER — DEXTROAMPHETAMINE SACCHARATE, AMPHETAMINE ASPARTATE, DEXTROAMPHETAMINE SULFATE AND AMPHETAMINE SULFATE 5; 5; 5; 5 MG/1; MG/1; MG/1; MG/1
20 TABLET ORAL 2 TIMES DAILY
Qty: 60 TABLET | Refills: 0 | Status: SHIPPED | OUTPATIENT
Start: 2024-11-25

## 2024-12-08 ENCOUNTER — HEALTH MAINTENANCE LETTER (OUTPATIENT)
Age: 69
End: 2024-12-08

## 2025-01-20 DIAGNOSIS — F98.8 ATTENTION DEFICIT DISORDER (ADD) WITHOUT HYPERACTIVITY: ICD-10-CM

## 2025-01-20 RX ORDER — DEXTROAMPHETAMINE SACCHARATE, AMPHETAMINE ASPARTATE, DEXTROAMPHETAMINE SULFATE AND AMPHETAMINE SULFATE 5; 5; 5; 5 MG/1; MG/1; MG/1; MG/1
20 TABLET ORAL 2 TIMES DAILY
Qty: 60 TABLET | Refills: 0 | Status: SHIPPED | OUTPATIENT
Start: 2025-01-20

## 2025-01-22 DIAGNOSIS — I10 ESSENTIAL HYPERTENSION: ICD-10-CM

## 2025-01-22 RX ORDER — LOSARTAN POTASSIUM AND HYDROCHLOROTHIAZIDE 25; 100 MG/1; MG/1
1 TABLET ORAL DAILY
Qty: 90 TABLET | Refills: 3 | Status: SHIPPED | OUTPATIENT
Start: 2025-01-22

## 2025-01-28 DIAGNOSIS — G47.9 SLEEP DISORDER: ICD-10-CM

## 2025-01-28 RX ORDER — QUETIAPINE FUMARATE 100 MG/1
400 TABLET, FILM COATED ORAL AT BEDTIME
Qty: 120 TABLET | Refills: 3 | Status: SHIPPED | OUTPATIENT
Start: 2025-01-28

## 2025-02-17 DIAGNOSIS — F98.8 ATTENTION DEFICIT DISORDER (ADD) WITHOUT HYPERACTIVITY: ICD-10-CM

## 2025-02-17 RX ORDER — DEXTROAMPHETAMINE SACCHARATE, AMPHETAMINE ASPARTATE, DEXTROAMPHETAMINE SULFATE AND AMPHETAMINE SULFATE 5; 5; 5; 5 MG/1; MG/1; MG/1; MG/1
20 TABLET ORAL 2 TIMES DAILY
Qty: 60 TABLET | Refills: 0 | Status: SHIPPED | OUTPATIENT
Start: 2025-02-17

## 2025-03-07 NOTE — TELEPHONE ENCOUNTER
How Severe Is Your Cyst?: moderate
Reason for Call:  Medication or medication refill:    Do you use a [facility]?  Name of the pharmacy and phone number for the current request: [facility]    Name of the medication requested:   dextroamphetamine-amphetamine (ADDERALL XR) 20 MG 24 hr capsule 60 capsule 0 1/13/2021  No   Sig - Route: Take 2 capsules (40 mg total) by mouth every morning. - Oral         Other request: na    Can we leave a detailed message on this number? Yes    Phone number patient can be reached at: Home number on file [phone] (home)    Best Time: any    Call taken on 2/11/2021 at 10:45 AM by Pamela Behr  
Telephone Encounter by Quynh Carty LPN at 2/11/2021  1:26 PM     Author: Quynh Carty LPN Service: -- Author Type: Licensed Nurse    Filed: 2/11/2021  1:46 PM Encounter Date: 2/11/2021 Status: Signed    : Quynh Carty LPN (Licensed Nurse)       Medication: Adderall XR 20 mg  Last Date Filled 1/14/21   pulled: YES          Only PCP Prescribing? : YES  Taken as prescribed from physician notes? YES    CSA in last year: NO  Random Utox in last year: NO  Opioids + benzodiazepines? NO     Last office visit was 11/3/20    Is patient on the Executive Review Team? NO      All responses suggest: Refilling prescription             
Is This A New Presentation, Or A Follow-Up?: Cysts

## 2025-03-13 DIAGNOSIS — F98.8 ATTENTION DEFICIT DISORDER (ADD) WITHOUT HYPERACTIVITY: ICD-10-CM

## 2025-03-13 RX ORDER — DEXTROAMPHETAMINE SACCHARATE, AMPHETAMINE ASPARTATE, DEXTROAMPHETAMINE SULFATE AND AMPHETAMINE SULFATE 5; 5; 5; 5 MG/1; MG/1; MG/1; MG/1
20 TABLET ORAL 2 TIMES DAILY
Qty: 60 TABLET | Refills: 0 | Status: SHIPPED | OUTPATIENT
Start: 2025-03-13

## 2025-04-10 DIAGNOSIS — I10 ESSENTIAL HYPERTENSION, BENIGN: ICD-10-CM

## 2025-04-10 DIAGNOSIS — R00.0 TACHYCARDIA: ICD-10-CM

## 2025-04-10 RX ORDER — METOPROLOL SUCCINATE 50 MG/1
50 TABLET, EXTENDED RELEASE ORAL DAILY
Qty: 90 TABLET | Refills: 0 | Status: SHIPPED | OUTPATIENT
Start: 2025-04-10

## 2025-04-14 DIAGNOSIS — F98.8 ATTENTION DEFICIT DISORDER (ADD) WITHOUT HYPERACTIVITY: ICD-10-CM

## 2025-04-14 RX ORDER — DEXTROAMPHETAMINE SACCHARATE, AMPHETAMINE ASPARTATE, DEXTROAMPHETAMINE SULFATE AND AMPHETAMINE SULFATE 5; 5; 5; 5 MG/1; MG/1; MG/1; MG/1
20 TABLET ORAL 2 TIMES DAILY
Qty: 60 TABLET | Refills: 0 | Status: SHIPPED | OUTPATIENT
Start: 2025-04-14

## 2025-04-14 NOTE — TELEPHONE ENCOUNTER
Medication Question or Refill        What medication are you calling about (include dose and sig)?: amphetamine-dextroamphetamine (ADDERALL) 20 MG tablet     Preferred Pharmacy:   [facility] #[id number] - [place], [place] - [address] [facility] AT [facility] [address]   [address] [place] [address]  Phone: [phone] Fax: [phone]        Controlled Substance Agreement on file:   CSA -- Patient Level:     [Media Unavailable] Controlled Substance Agreement - Non - Opioid - Scan on 9/24/2021  1:18 PM: NON-OPIOID CONTROLLED SUBSTANCE AGREEMENT   [Media Unavailable] Controlled Substance Agreement - Non - Opioid - Scan on 2/5/2020: NON-OPIOID CONTROLLED SUBSTANCE AGREEMENT   [Media Unavailable] Controlled Substance Agreement - Non - Opioid - Scan on 10/24/2019   [Media Unavailable] Controlled Substance Agreement - Opioid - Scan on 10/22/2018   [Media Unavailable] Controlled Substance Agreement - Opioid - Scan on 4/19/2018   [Media Unavailable] Controlled Substance Agreement - Opioid - Scan on 10/2/2017   [Media Unavailable] Controlled Substance Agreement - Opioid - Scan on 7/27/2016: CONTROLLED SUBSTANCE 7/25/2016       Who prescribed the medication?: Camilo    Do you need a refill? Yes    When did you use the medication last? [id number]    Patient offered an appointment? No    Do you have any questions or concerns?  No      Could we send this information to you in [facility] or would you prefer to receive a phone call?:   Patient would prefer a phone call   Okay to leave a detailed message?: Yes at Cell number on file:    Telephone Information:   Mobile [phone]

## 2025-05-05 DIAGNOSIS — G47.9 SLEEP DISORDER: ICD-10-CM

## 2025-05-05 NOTE — TELEPHONE ENCOUNTER
ibuprofen (ADVIL/MOTRIN) 600 MG tablet (Discontinued) 90 tablet 3 9/22/2022 2/7/2024 No   Sig: TAKE 1 TABLET(600 MG) BY MOUTH EVERY 8 HOURS AS NEEDED FOR MODERATE PAIN   Sent to pharmacy as: Ibuprofen 600 MG Oral Tablet (ADVIL/MOTRIN)     Cannot pend pt needs both medications this and the one pended please advise

## 2025-05-06 RX ORDER — QUETIAPINE FUMARATE 100 MG/1
400 TABLET, FILM COATED ORAL AT BEDTIME
Qty: 120 TABLET | Refills: 3 | Status: SHIPPED | OUTPATIENT
Start: 2025-05-06

## 2025-05-14 DIAGNOSIS — F98.8 ATTENTION DEFICIT DISORDER (ADD) WITHOUT HYPERACTIVITY: ICD-10-CM

## 2025-05-14 RX ORDER — DEXTROAMPHETAMINE SACCHARATE, AMPHETAMINE ASPARTATE, DEXTROAMPHETAMINE SULFATE AND AMPHETAMINE SULFATE 5; 5; 5; 5 MG/1; MG/1; MG/1; MG/1
20 TABLET ORAL 2 TIMES DAILY
Qty: 60 TABLET | Refills: 0 | Status: SHIPPED | OUTPATIENT
Start: 2025-05-14

## 2025-06-13 DIAGNOSIS — F98.8 ATTENTION DEFICIT DISORDER (ADD) WITHOUT HYPERACTIVITY: ICD-10-CM

## 2025-06-14 RX ORDER — DEXTROAMPHETAMINE SACCHARATE, AMPHETAMINE ASPARTATE, DEXTROAMPHETAMINE SULFATE AND AMPHETAMINE SULFATE 5; 5; 5; 5 MG/1; MG/1; MG/1; MG/1
20 TABLET ORAL 2 TIMES DAILY
Qty: 60 TABLET | Refills: 0 | Status: SHIPPED | OUTPATIENT
Start: 2025-06-14

## 2025-07-10 DIAGNOSIS — I10 ESSENTIAL HYPERTENSION, BENIGN: ICD-10-CM

## 2025-07-10 DIAGNOSIS — R00.0 TACHYCARDIA: ICD-10-CM

## 2025-07-10 RX ORDER — METOPROLOL SUCCINATE 50 MG/1
50 TABLET, EXTENDED RELEASE ORAL DAILY
Qty: 90 TABLET | Refills: 3 | Status: SHIPPED | OUTPATIENT
Start: 2025-07-10

## 2025-07-18 DIAGNOSIS — I10 HYPERTENSION: ICD-10-CM

## 2025-07-20 RX ORDER — AMLODIPINE BESYLATE 5 MG/1
5 TABLET ORAL DAILY
Qty: 90 TABLET | Refills: 3 | Status: SHIPPED | OUTPATIENT
Start: 2025-07-20

## 2025-08-11 DIAGNOSIS — F98.8 ATTENTION DEFICIT DISORDER (ADD) WITHOUT HYPERACTIVITY: ICD-10-CM

## 2025-08-11 RX ORDER — DEXTROAMPHETAMINE SACCHARATE, AMPHETAMINE ASPARTATE, DEXTROAMPHETAMINE SULFATE AND AMPHETAMINE SULFATE 5; 5; 5; 5 MG/1; MG/1; MG/1; MG/1
20 TABLET ORAL 2 TIMES DAILY
Qty: 60 TABLET | Refills: 0 | Status: SHIPPED | OUTPATIENT
Start: 2025-08-11